# Patient Record
Sex: FEMALE | Race: WHITE | NOT HISPANIC OR LATINO | Employment: FULL TIME | URBAN - METROPOLITAN AREA
[De-identification: names, ages, dates, MRNs, and addresses within clinical notes are randomized per-mention and may not be internally consistent; named-entity substitution may affect disease eponyms.]

---

## 2017-07-24 ENCOUNTER — TELEPHONE (OUTPATIENT)
Dept: INFECTIOUS DISEASES | Facility: CLINIC | Age: 32
End: 2017-07-24

## 2017-07-24 ENCOUNTER — HOSPITAL ENCOUNTER (OUTPATIENT)
Dept: RADIOLOGY | Facility: HOSPITAL | Age: 32
Discharge: HOME | End: 2017-07-24
Attending: INTERNAL MEDICINE
Payer: COMMERCIAL

## 2017-07-24 DIAGNOSIS — R76.12 POSITIVE QUANTIFERON-TB GOLD TEST: Primary | ICD-10-CM

## 2017-07-24 DIAGNOSIS — R76.12 POSITIVE QUANTIFERON-TB GOLD TEST: ICD-10-CM

## 2017-07-24 PROCEDURE — 71020 XR CHEST PA AND LATERAL: CPT | Mod: 26,,, | Performed by: RADIOLOGY

## 2017-07-24 PROCEDURE — 71020 XR CHEST PA AND LATERAL: CPT | Mod: TC

## 2017-08-21 ENCOUNTER — OFFICE VISIT (OUTPATIENT)
Dept: INFECTIOUS DISEASES | Facility: CLINIC | Age: 32
End: 2017-08-21

## 2017-08-21 VITALS
HEART RATE: 64 BPM | SYSTOLIC BLOOD PRESSURE: 125 MMHG | WEIGHT: 223.31 LBS | HEIGHT: 65 IN | TEMPERATURE: 98 F | BODY MASS INDEX: 37.2 KG/M2 | DIASTOLIC BLOOD PRESSURE: 83 MMHG

## 2017-08-21 DIAGNOSIS — Z22.7 LATENT TUBERCULOSIS BY BLOOD TEST: Primary | ICD-10-CM

## 2017-08-21 PROCEDURE — 99204 OFFICE O/P NEW MOD 45 MIN: CPT | Mod: S$PBB,,, | Performed by: INTERNAL MEDICINE

## 2017-08-21 PROCEDURE — 99999 PR PBB SHADOW E&M-EST. PATIENT-LVL III: CPT | Mod: PBBFAC,,, | Performed by: INTERNAL MEDICINE

## 2017-08-21 PROCEDURE — 99213 OFFICE O/P EST LOW 20 MIN: CPT | Mod: PBBFAC | Performed by: INTERNAL MEDICINE

## 2017-08-21 NOTE — PROGRESS NOTES
Subjective:      Patient ID: Katarina Munguia is a 32 y.o. female.    Chief Complaint:Positive PPD      History of Present Illness    Positive PPD, Initial Visit  Patient is here for evaluation of positive quantiferon. PPD was not done in the past. Quantiferon was positive on 7/12/17 .  There is no history of BCG. There is no history of exposure to TB. She is working in [employer]-no patient contact. Current symptoms: none. Patient denies cough, diarrhea, fatigue, fever, night sweats and weight loss. Chest x-ray was done 7/24/17 and result was negative for active TB.     Review of Systems   Constitution: Negative for chills, decreased appetite, fever, weakness, malaise/fatigue, night sweats, weight gain and weight loss.   HENT: Negative for congestion, ear pain, headaches, hearing loss, hoarse voice, sore throat and tinnitus.    Eyes: Negative for blurred vision, redness and visual disturbance.   Cardiovascular: Negative for chest pain, leg swelling and palpitations.   Respiratory: Negative for cough, hemoptysis, shortness of breath and sputum production.    Hematologic/Lymphatic: Negative for adenopathy. Does not bruise/bleed easily.   Skin: Negative for dry skin, itching, rash and suspicious lesions.   Musculoskeletal: Negative for back pain, joint pain, myalgias and neck pain.   Gastrointestinal: Negative for abdominal pain, constipation, diarrhea, heartburn, nausea and vomiting.   Genitourinary: Negative for dysuria, flank pain, frequency, hematuria, hesitancy and urgency.   Neurological: Negative for dizziness, numbness and paresthesias.   Psychiatric/Behavioral: Negative for depression and memory loss. The patient does not have insomnia and is not nervous/anxious.      Objective:   Physical Exam   Constitutional: She is oriented to person, place, and time. She appears well-developed and well-nourished. She is cooperative.  Non-toxic appearance. No distress.   HENT:   Head: Normocephalic and atraumatic.   Right Ear:  Hearing and external ear normal.   Left Ear: Hearing and external ear normal.   Nose: Nose normal.   Mouth/Throat: Oropharynx is clear and moist.   Eyes: Conjunctivae, EOM and lids are normal. Pupils are equal, round, and reactive to light. Right eye exhibits no discharge. Left eye exhibits no discharge. Right conjunctiva is not injected. Left conjunctiva is not injected. No scleral icterus.   Neck: Normal range of motion. No tracheal deviation present.   Cardiovascular: Normal rate.    Pulmonary/Chest: Effort normal. No accessory muscle usage or stridor. No respiratory distress. She has no wheezes.   Abdominal: Normal appearance. She exhibits no distension.   Musculoskeletal: Normal range of motion. She exhibits no edema.   Neurological: She is alert and oriented to person, place, and time. Coordination normal.   Skin: Skin is warm and dry. No rash noted. She is not diaphoretic. No erythema.   Psychiatric: She has a normal mood and affect. Her speech is normal and behavior is normal. Judgment and thought content normal. Cognition and memory are normal.   Nursing note and vitals reviewed.    Assessment:       1. Latent tuberculosis by blood test          Plan:       Katarina was seen today for positive ppd.    Diagnoses and all orders for this visit:    Latent tuberculosis by blood test       The patient has had negative PPDs in the past and no risk factors for TB. Will repeat the quantiferon.   If it is negative, plan to do another quantiferon in 1 year.  If it is positive then will offer isoniazid.  The patient has been counseled regarding latent TB.

## 2018-05-24 ENCOUNTER — OFFICE VISIT (OUTPATIENT)
Dept: ALLERGY | Facility: CLINIC | Age: 33
End: 2018-05-24
Payer: COMMERCIAL

## 2018-05-24 VITALS
BODY MASS INDEX: 35.63 KG/M2 | OXYGEN SATURATION: 99 % | SYSTOLIC BLOOD PRESSURE: 120 MMHG | HEART RATE: 67 BPM | DIASTOLIC BLOOD PRESSURE: 80 MMHG | HEIGHT: 65 IN | WEIGHT: 213.88 LBS

## 2018-05-24 DIAGNOSIS — R05.9 COUGH: ICD-10-CM

## 2018-05-24 DIAGNOSIS — J33.9 NASAL POLYP: Primary | ICD-10-CM

## 2018-05-24 DIAGNOSIS — J31.0 RHINITIS, UNSPECIFIED TYPE: ICD-10-CM

## 2018-05-24 PROCEDURE — 99999 PR PBB SHADOW E&M-EST. PATIENT-LVL III: CPT | Mod: PBBFAC,,, | Performed by: STUDENT IN AN ORGANIZED HEALTH CARE EDUCATION/TRAINING PROGRAM

## 2018-05-24 PROCEDURE — 99203 OFFICE O/P NEW LOW 30 MIN: CPT | Mod: S$GLB,,, | Performed by: STUDENT IN AN ORGANIZED HEALTH CARE EDUCATION/TRAINING PROGRAM

## 2018-05-24 PROCEDURE — 3008F BODY MASS INDEX DOCD: CPT | Mod: CPTII,S$GLB,, | Performed by: STUDENT IN AN ORGANIZED HEALTH CARE EDUCATION/TRAINING PROGRAM

## 2018-05-24 RX ORDER — NORETHINDRONE ACETATE AND ETHINYL ESTRADIOL AND FERROUS FUMARATE 1.5-30(21)
1 KIT ORAL DAILY
COMMUNITY
Start: 2018-05-02 | End: 2021-06-21

## 2018-05-24 RX ORDER — PREDNISONE 20 MG/1
40 TABLET ORAL DAILY
Qty: 28 TABLET | Refills: 0 | Status: SHIPPED | OUTPATIENT
Start: 2018-05-24 | End: 2018-06-07

## 2018-05-24 RX ORDER — FLUTICASONE PROPIONATE 50 MCG
1 SPRAY, SUSPENSION (ML) NASAL DAILY
COMMUNITY
End: 2018-06-01

## 2018-05-24 NOTE — PROGRESS NOTES
Allergy Clinic Note  [facility]    Subjective:      Patient ID: Katarina Munguia is a 32 y.o. female.    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    She reports a several year history of nasal congestion on a perennial basis, both day and night, with no symptom-free days.  She also complains of anterior rhinitis with drainage that is clear and thin.  Associated symptoms include sneezing attacks, mouth breathing and headache pressure below her eyes or across her forehead.  There are no clear precipitants except that the sneezing is worse in her work environment.  She has some minimal relief from Flonase 1 squirt each nostril.  She has not had allergy testing in the past.    Patient also complains of a cough that she localizes to her chest.  She denies chest pressure, heartburn, shortness of breath, or wheezing.  She does admit to dyspnea with 1 flight of stairs that she attributes to deconditioning.  She says she may have had an inhaler as a child but does not recall any details.  No bronchospasm, asthma or chest symptoms as an adult.    She denies significant infection history.  Client admits to occasional green nasal discharge without fever or other constitutional symptoms.  She said it is it usually is able to run its course.      Additional History:   Past medical history is otherwise unremarkable.   Family history is significant for a sister with childhood asthma and 2 sisters with rhinitis. Client   reports that she has never smoked. She does not have any smokeless tobacco history on file.  Exposures are notable for a dog in the bedroom.  No exposure to smoke, mold, or unusual substances.  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    No current outpatient prescriptions on file prior to visit.     No current facility-administered medications on file prior to visit.          Review of Systems   Constitutional: Negative for chills, fever and malaise/fatigue.   HENT: Positive for congestion. Negative for ear discharge.    Eyes: Negative for pain and discharge.   Respiratory: Negative for hemoptysis, shortness of breath, wheezing and stridor.    Cardiovascular: Negative for chest pain and palpitations.   Gastrointestinal: Negative for abdominal pain, blood in stool, nausea and vomiting.   Genitourinary: Negative for dysuria, flank pain and hematuria.   Skin: Negative for itching and rash.   Neurological: Positive for headaches. Negative for seizures and loss of consciousness.       Objective:   /80 (BP Location: Left arm, Patient Position: Sitting)   Pulse 67   Ht 5' 5" (1.651 m)   Wt 97 kg (213 lb 13.5 oz)   SpO2 99%   BMI 35.59 kg/m²       Physical Exam   Constitutional: She is oriented to person, place, and time and well-developed, well-nourished, and in no distress.   HENT:   Head: Normocephalic and atraumatic.   Nose: Nose normal.   Right TM clear.  Left TM opaque area in Center otherwise clear.  Right nares pale with moderate turbinates swelling.  Left nares with irregularly shaped tissue, pink and slightly cool listening seen distally.  It does not clear with nose blowing.   Eyes: Conjunctivae are normal. No scleral icterus.   Neck: Neck supple.   Cardiovascular: Normal rate, regular rhythm, normal heart sounds and intact distal pulses.    Pulmonary/Chest: Effort normal and breath sounds normal. No stridor. No respiratory distress. She has no wheezes.   Abdominal: She exhibits no distension and no mass. There is tenderness (Minimal right upper quadrant tenderness without guarding). There is no guarding.   Musculoskeletal: She exhibits no edema or deformity.   Lymphadenopathy:     She has no cervical adenopathy. "   Neurological: She is alert and oriented to person, place, and time.   Skin: No rash noted. No erythema.   Psychiatric: Affect and judgment normal.       Data: none      Assessment:     1. Nasal polyp    2. Rhinitis, unspecified type    3. Cough        Plan:     Medical decision making:  Ms. Munguia is presenting with chronic nasal congestion and a suspected polyp in her left naris.  We discussed treatment options for nasal polyps including high-dose nasal steroids versus low dose oral steroids versus surgery.  She opted for oral steroids.  We discussed short-term side effects, including hunger and mood swings.  She understands and accepts.  We also discussed methods for allergy testing, including pros and cons of skin testing versus Immunocap blood testing.  She options for skin testing which we will schedule for a future visit.   Her cough is of undetermined etiology at this point.  Despite the fact that she localizes it to her chest, I do not believe she has any bronchospasm.  She may have GERD.  She may have postnasal drip.  Plan to control the rhinitis 1st and then reassess.      Katarina was seen today for allergies and nasal congestion.    Diagnoses and all orders for this visit:    Nasal polyp - new Dx  -     predniSONE (DELTASONE) 20 MG tablet; Take 2 tablets (40 mg total) by mouth once daily. To shrink nasal polyp.   - Hold Flonase    Rhinitis, unspecified type - stable       - skin twsting next visit    Cough - stable  Reassess after upper airway sx controlled.        Patient Instructions   Testing  Skin testing at next visit.  (No antihistamines (e.g. Benadryl, Zyrtec) for 7 days prior.      Treatment  Stop Flonase for now    prednisone = pill steroid:  2 tablets a day for 14 days           Follow-up in about 2 weeks (around 6/7/2018) for skin testing (60 min).    Glenys Guerrero MD

## 2018-05-24 NOTE — PATIENT INSTRUCTIONS
Testing  Skin testing at next visit.  (No antihistamines (e.g. Benadryl, Zyrtec) for 7 days prior.      Treatment  Stop Flonase for now    prednisone = pill steroid:  2 tablets a day for 14 days

## 2018-05-28 ENCOUNTER — PATIENT MESSAGE (OUTPATIENT)
Dept: ALLERGY | Facility: CLINIC | Age: 33
End: 2018-05-28

## 2018-06-01 ENCOUNTER — OFFICE VISIT (OUTPATIENT)
Dept: INTERNAL MEDICINE | Facility: CLINIC | Age: 33
End: 2018-06-01
Payer: COMMERCIAL

## 2018-06-01 DIAGNOSIS — J33.9 NASAL POLYP: ICD-10-CM

## 2018-06-01 DIAGNOSIS — Z00.00 ANNUAL PHYSICAL EXAM: Primary | ICD-10-CM

## 2018-06-01 DIAGNOSIS — E78.5 HYPERLIPIDEMIA, UNSPECIFIED HYPERLIPIDEMIA TYPE: ICD-10-CM

## 2018-06-01 DIAGNOSIS — R03.0 ELEVATED BP WITHOUT DIAGNOSIS OF HYPERTENSION: ICD-10-CM

## 2018-06-01 DIAGNOSIS — Z85.820 HISTORY OF MELANOMA: ICD-10-CM

## 2018-06-01 PROCEDURE — 99385 PREV VISIT NEW AGE 18-39: CPT | Mod: S$GLB,,, | Performed by: INTERNAL MEDICINE

## 2018-06-01 PROCEDURE — 99999 PR PBB SHADOW E&M-EST. PATIENT-LVL III: CPT | Mod: PBBFAC,,, | Performed by: INTERNAL MEDICINE

## 2018-06-01 NOTE — PROGRESS NOTES
Internal Medicine    Subjective:      Patient ID: Katarina Munguia is a 33 y.o. female.    Chief Complaint: Establish Care    HPI:  Patient presents to establish care.     Elevated BP reading at work health screen, chiropractor, and home.  HLD on work screen.      BMI 35:  Since work screen has been following [employer] and working on exercise.    Saw Allergy, Dr. Guerrero, on 5/24/18.  History of nasal congestion and nasal polyp.  Prescribed prednisone (has not started yet).  Planning on skin testing.  Previously used Flonase and it was not helpful.      H/o melanoma:  Followed by Dermatology, Dr. Howard - gets annual screens.    ObGyn:  Dr. Mas at [facility].        Review of Systems   Constitutional: Negative for appetite change, chills, fatigue, fever and unexpected weight change.   HENT: Positive for congestion and sneezing. Negative for sinus pain, sinus pressure, sore throat and trouble swallowing.    Eyes: Negative for visual disturbance.   Respiratory: Negative for cough, chest tightness, shortness of breath and wheezing.    Cardiovascular: Negative for chest pain, palpitations and leg swelling.   Gastrointestinal: Negative for abdominal pain, blood in stool, constipation, diarrhea, nausea and vomiting.   Genitourinary: Negative for difficulty urinating.   Musculoskeletal: Negative for arthralgias and myalgias.   Skin: Negative for rash and wound.   Neurological: Negative for dizziness, weakness, numbness and headaches.   Psychiatric/Behavioral: Negative for behavioral problems and confusion.       Past Medical History:   Diagnosis Date    Allergy     Melanoma      Past Surgical History:   Procedure Laterality Date    FOOT SURGERY      TONSILLECTOMY       Family History   Problem Relation Age of Onset    Hypertension Mother     Hyperlipidemia Mother     Transient ischemic attack Mother     Cancer Father     Lung cancer Father     Heart disease Maternal Grandfather     Heart disease Sister   "    Social History   Substance Use Topics    Smoking status: Never Smoker    Smokeless tobacco: Never Used    Alcohol use 0.6 oz/week     1 Cans of beer per week      Comment: occasional       Medications and allergies reviewed.     Objective:     Vitals:    06/01/18 0917 06/03/18 1129   BP: 110/72 (!) 125/90   Weight: 95.4 kg (210 lb 5.1 oz)    Height: 5' 5" (1.651 m)      Physical Exam   Constitutional: She is oriented to person, place, and time. She appears well-developed and well-nourished. No distress.   HENT:   Head: Normocephalic and atraumatic.   Eyes: Conjunctivae and EOM are normal. No scleral icterus.   Neck: No thyromegaly present.   Cardiovascular: Normal rate and regular rhythm.  Exam reveals no gallop and no friction rub.    No murmur heard.  Pulmonary/Chest: Effort normal and breath sounds normal. No respiratory distress. She has no wheezes. She has no rales.   Abdominal: Soft. Bowel sounds are normal. She exhibits no distension and no mass. There is no tenderness. There is no rebound and no guarding.   Musculoskeletal: She exhibits no edema or tenderness.   Lymphadenopathy:     She has no cervical adenopathy.   Neurological: She is alert and oriented to person, place, and time.   Skin: No rash noted. No erythema.   Psychiatric: She has a normal mood and affect. Her behavior is normal.       Assessment:     1. Annual physical exam    2. Elevated BP without diagnosis of hypertension    3. Hyperlipidemia, unspecified hyperlipidemia type    4. Nasal polyp    5. History of melanoma    6. BMI 35.0-35.9,adult        Plan:     *Continue medication/plan as discussed in HPI except for changes discussed below.    Katarina was seen today for establish care.    Diagnoses and all orders for this visit:    Annual physical exam  -     CBC auto differential; Future; Expected date: 06/01/2018  -     Comprehensive metabolic panel; Future; Expected date: 06/01/2018  -     Hemoglobin A1c; Future; Expected date: " 06/01/2018  -     Lipid panel; Future; Expected date: 06/01/2018  -     TSH; Future; Expected date: 06/01/2018    Elevated BP without diagnosis of hypertension  -     Comprehensive metabolic panel; Future; Expected date: 06/01/2018  -     TSH; Future; Expected date: 06/01/2018    Hyperlipidemia, unspecified hyperlipidemia type  -     Lipid panel; Future; Expected date: 06/01/2018    BMI 35    Nasal polyp    History of melanoma    Health maintenance reviewed with patient.     Follow-up in about 4 weeks (around 6/29/2018).    Shilpa Sarabia MD  Internal Medicine  [facility]

## 2018-06-01 NOTE — PATIENT INSTRUCTIONS
Please check your blood pressure daily and bring blood pressure log and blood pressure cuff to next appointment.

## 2018-06-01 NOTE — PROGRESS NOTES
Allergy Clinic Note  [facility]    Subjective:          Chief Complaint: Allergy Testing      Allergy problem list  Rhinitis  Cough  Suspected nasal polyp    History of Present Illness: Katarina Munguia is a 32 y.o. female [employer] employee with rhinitis, cough, and suspected nasal polyp who returns at my request to follow up symptoms and assess triggers.     At last visit, patient noted to have a blocked left naris with glistening tissue.  She was prescribed a 2 week course of prednisone for suspected nasal polyp.  She opted for skin testing rather than serum specific IgE levels because she is considering allergy vaccines.     Related medications  Prednisone 40 mg qd F14 -- started 3 days ago    She reports blood with blowing her nose this morning.  Patient reports burning Q-tip in both nostrils.   She still complains of nasal congestion greater on the left.     No new problems or complaints.     Additional History:   Interval Hx is unremarkable.  Client is a lifetime nonsmoker. Past medical, family, and social histories are unchanged.  Exposures are notable for dog in the bedroom                   Patient Active Problem List   Diagnosis    Elevated BP without diagnosis of hypertension    Hyperlipidemia    Nasal polyp    History of melanoma    BMI 35.0-35.9,adult     Current Outpatient Prescriptions on File Prior to Visit   Medication Sig Dispense Refill    MICROGESTIN FE 1.5/30, 28, 1.5 mg-30 mcg (21)/75 mg (7) tablet       multivitamin capsule Take 1 capsule by mouth once daily.      predniSONE (DELTASONE) 20 MG tablet Take 2 tablets (40 mg total) by mouth once daily. To shrink nasal polyp. 28 tablet 0     No current facility-administered medications on file prior to visit.          Review of Systems   Constitutional: Negative for chills and fever.   HENT: Negative for ear discharge and nosebleeds.    Eyes: Negative for discharge and redness.   Respiratory: Negative for hemoptysis, sputum  "production and stridor.    Gastrointestinal: Negative for blood in stool, melena and vomiting.   Genitourinary: Negative for hematuria.   Skin: Negative for itching and rash.   Neurological: Negative for seizures and loss of consciousness.       Objective:   BP (!) 160/110 (BP Location: Left arm, Patient Position: Sitting)   Pulse 102   Ht 5' 5" (1.651 m)   Wt 97.5 kg (214 lb 15.2 oz)   LMP 06/01/2018   SpO2 98%   BMI 35.77 kg/m²       Physical Exam   Constitutional: She is oriented to person, place, and time and well-developed, well-nourished, and in no distress.   HENT:   Head: Normocephalic and atraumatic.   Nose: Nose normal.   Right nares clear.  Left nares copious dark heme obscuring nasal passage.  Source not identified   Eyes: Conjunctivae are normal. No scleral icterus.   Neck: Neck supple.   Cardiovascular: Normal rate, regular rhythm, normal heart sounds and intact distal pulses.    Pulmonary/Chest: Effort normal and breath sounds normal. No stridor. No respiratory distress. She has no wheezes.   Abdominal: She exhibits no distension.   Musculoskeletal: She exhibits no edema or deformity.   Neurological: She is alert and oriented to person, place, and time.   Skin: No rash noted. No erythema.   Psychiatric: Affect and judgment normal.       Data:   aeroallergen testing by the skin prick method with completely negative with appropriate positive and negative controls.      Assessment:     1. Nonallergic rhinitis    2. Nasal polyp    3. Nasal bleeding        Plan:     Medical Decision Making:  Client is presenting with nonallergic rhinitis and suspected nasal polyp.  Her nasal exam today is impeded by her recent nose bleed, which was probably traumatic.  Patient instructed not to put anything in her nose until next visit, near completion of her prednisone course.    Katarina was seen today for allergy testing.    Diagnoses and all orders for this visit:    Nonallergic rhinitis  Copy skin tests " given  Discussed pg vasomotor rhinitis and HO given    Suspected Nasal polyp  Prednisone 40 mg qd x 14 days total  RTC near end of course    Nasal bleeding  probably traumatic  Don't put anything in your nose until next visit        Patient Instructions       Continue prednisone 2 tablets daily                Return 7-10 days      Follow-up in about 10 days (around 6/14/2018).    Glenys Guerrero MD

## 2018-06-03 VITALS
BODY MASS INDEX: 35.04 KG/M2 | SYSTOLIC BLOOD PRESSURE: 125 MMHG | DIASTOLIC BLOOD PRESSURE: 90 MMHG | HEIGHT: 65 IN | WEIGHT: 210.31 LBS

## 2018-06-03 PROBLEM — E78.5 HYPERLIPIDEMIA: Status: ACTIVE | Noted: 2018-06-03

## 2018-06-03 PROBLEM — R03.0 ELEVATED BP WITHOUT DIAGNOSIS OF HYPERTENSION: Status: ACTIVE | Noted: 2018-06-03

## 2018-06-03 PROBLEM — J33.9 NASAL POLYP: Status: ACTIVE | Noted: 2018-06-03

## 2018-06-03 PROBLEM — Z85.820 HISTORY OF MELANOMA: Status: ACTIVE | Noted: 2018-06-03

## 2018-06-04 ENCOUNTER — OFFICE VISIT (OUTPATIENT)
Dept: ALLERGY | Facility: CLINIC | Age: 33
End: 2018-06-04
Payer: COMMERCIAL

## 2018-06-04 VITALS
HEART RATE: 102 BPM | BODY MASS INDEX: 35.81 KG/M2 | SYSTOLIC BLOOD PRESSURE: 160 MMHG | HEIGHT: 65 IN | OXYGEN SATURATION: 98 % | DIASTOLIC BLOOD PRESSURE: 110 MMHG | WEIGHT: 214.94 LBS

## 2018-06-04 DIAGNOSIS — J33.9 NASAL POLYP: ICD-10-CM

## 2018-06-04 DIAGNOSIS — J31.0 NONALLERGIC RHINITIS: Primary | ICD-10-CM

## 2018-06-04 DIAGNOSIS — R04.0 NASAL BLEEDING: ICD-10-CM

## 2018-06-04 PROCEDURE — 3008F BODY MASS INDEX DOCD: CPT | Mod: CPTII,S$GLB,, | Performed by: STUDENT IN AN ORGANIZED HEALTH CARE EDUCATION/TRAINING PROGRAM

## 2018-06-04 PROCEDURE — 99213 OFFICE O/P EST LOW 20 MIN: CPT | Mod: 25,S$GLB,, | Performed by: STUDENT IN AN ORGANIZED HEALTH CARE EDUCATION/TRAINING PROGRAM

## 2018-06-04 PROCEDURE — 99999 PR PBB SHADOW E&M-EST. PATIENT-LVL III: CPT | Mod: PBBFAC,,, | Performed by: STUDENT IN AN ORGANIZED HEALTH CARE EDUCATION/TRAINING PROGRAM

## 2018-06-04 PROCEDURE — 95004 PERQ TESTS W/ALRGNC XTRCS: CPT | Mod: S$GLB,,, | Performed by: STUDENT IN AN ORGANIZED HEALTH CARE EDUCATION/TRAINING PROGRAM

## 2018-06-04 NOTE — PATIENT INSTRUCTIONS
Continue prednisone 2 tablets daily      Do not put anything in your nose until next visit.          Return 7-10 days

## 2018-06-11 ENCOUNTER — OFFICE VISIT (OUTPATIENT)
Dept: ALLERGY | Facility: CLINIC | Age: 33
End: 2018-06-11
Payer: COMMERCIAL

## 2018-06-11 VITALS
WEIGHT: 216.25 LBS | HEIGHT: 65 IN | HEART RATE: 84 BPM | DIASTOLIC BLOOD PRESSURE: 88 MMHG | OXYGEN SATURATION: 98 % | SYSTOLIC BLOOD PRESSURE: 124 MMHG | BODY MASS INDEX: 36.03 KG/M2

## 2018-06-11 DIAGNOSIS — R05.9 COUGH: ICD-10-CM

## 2018-06-11 DIAGNOSIS — R04.0 NASAL BLEEDING: Primary | ICD-10-CM

## 2018-06-11 DIAGNOSIS — J31.0 NONALLERGIC RHINITIS: ICD-10-CM

## 2018-06-11 PROCEDURE — 3008F BODY MASS INDEX DOCD: CPT | Mod: CPTII,S$GLB,, | Performed by: STUDENT IN AN ORGANIZED HEALTH CARE EDUCATION/TRAINING PROGRAM

## 2018-06-11 PROCEDURE — 99213 OFFICE O/P EST LOW 20 MIN: CPT | Mod: S$GLB,,, | Performed by: STUDENT IN AN ORGANIZED HEALTH CARE EDUCATION/TRAINING PROGRAM

## 2018-06-11 PROCEDURE — 99999 PR PBB SHADOW E&M-EST. PATIENT-LVL III: CPT | Mod: PBBFAC,,, | Performed by: STUDENT IN AN ORGANIZED HEALTH CARE EDUCATION/TRAINING PROGRAM

## 2018-06-11 NOTE — PROGRESS NOTES
Allergy Clinic Note  [facility]    Subjective:          Chief Complaint: Follow-up      Allergy problem list  Nasal septal erosion  Hx nasal polyps  LUISA    History of Present Illness: Katarina Munguia is a 33 y.o. female with significant left septal erosion and possible nasal polyps who returns at my request to follow up nasal Px following 2 week course of prednisone.    At last visit, skin tests were negative but on exam L naris had copious heme and mucosa was not visible.    She reports no significant difference in nasal congestion.  No side effects or other problems.    Related medications  Prednisone 40 qd f14 -- on about day 12  Flonase - held    She denies recent cough.    No new problems or complaints.    Additional History:   Interval Hx is unremarkable.  Client is a lifetime nonsmoker.Past medical, family, and social histories are unchanged.       Patient Active Problem List   Diagnosis    Elevated BP without diagnosis of hypertension    Hyperlipidemia    Nasal polyp    History of melanoma    BMI 35.0-35.9,adult     Current Outpatient Prescriptions on File Prior to Visit   Medication Sig Dispense Refill    MICROGESTIN FE 1.5/30, 28, 1.5 mg-30 mcg (21)/75 mg (7) tablet       multivitamin capsule Take 1 capsule by mouth once daily.       No current facility-administered medications on file prior to visit.          Review of Systems   Constitutional: Negative for chills and fever.   HENT: Negative for ear discharge and nosebleeds.    Eyes: Negative for discharge and redness.   Respiratory: Negative for hemoptysis, sputum production and stridor.    Cardiovascular: Negative for chest pain and palpitations.   Gastrointestinal: Negative for blood in stool, melena and vomiting.   Genitourinary: Negative for dysuria and hematuria.   Skin: Negative for itching and rash.   Neurological: Negative for seizures and loss of consciousness.       Objective:   /88 (BP Location: Right arm, Patient  "Position: Sitting)   Pulse 84   Ht 5' 5" (1.651 m)   Wt 98.1 kg (216 lb 4.3 oz)   LMP 06/01/2018   SpO2 98%   BMI 35.99 kg/m²       Physical Exam   Constitutional: She is oriented to person, place, and time and well-developed, well-nourished, and in no distress.   HENT:   Head: Normocephalic and atraumatic.   Nose: Nose normal.   TMs clear bilaterally.  Right naris pink without significant swelling.  Left nares enlarged tissue along the lateral wall without heme or other abnormality.    Eyes: Conjunctivae are normal. No scleral icterus.   Neck: Neck supple.   Cardiovascular: Normal rate, regular rhythm and intact distal pulses.    Pulmonary/Chest: Effort normal. No stridor. No respiratory distress.   Abdominal: She exhibits no distension.   Musculoskeletal: She exhibits no edema or deformity.   Neurological: She is alert and oriented to person, place, and time.   Skin: No rash noted. No erythema.   Psychiatric: Affect and judgment normal.       Data:   Aeroallergen testing by the prick method was negative last      Assessment:     1. Nasal bleeding    2. Cough    3. Nonallergic rhinitis        Plan:     Medical Decision Making:  Patient is nasal exam is significantly improved.  No polyp is appreciated.  Patient continues to complain of congestion but would prefer not to continue Flonase.  As right now her treatment is mainly symptomatic, I geovaniLucie Bray was seen today for follow-up.    Diagnoses and all orders for this visit:    Nasal bleeding - resolved  Return if recurs    Cough - quiescent with no Tx  Return if recurs    Nonallergic rhinitis - stable  Patient prefers not to restart Flonase  Return p.r.n.        Patient Instructions   Sore in Left nostril is significantly improved.    Further Flonase is optional.    Return as needed.      Follow-up if symptoms worsen or fail to improve.    Glenys Guerrero MD    "

## 2018-06-11 NOTE — PATIENT INSTRUCTIONS
Sore in Left nostril is significantly improved.    Further Flonase is optional.    Return as needed.

## 2018-08-03 ENCOUNTER — PATIENT MESSAGE (OUTPATIENT)
Dept: ADMINISTRATIVE | Facility: OTHER | Age: 33
End: 2018-08-03

## 2018-08-03 ENCOUNTER — OFFICE VISIT (OUTPATIENT)
Dept: INTERNAL MEDICINE | Facility: CLINIC | Age: 33
End: 2018-08-03
Payer: COMMERCIAL

## 2018-08-03 VITALS
BODY MASS INDEX: 35.74 KG/M2 | WEIGHT: 214.5 LBS | SYSTOLIC BLOOD PRESSURE: 136 MMHG | HEART RATE: 84 BPM | DIASTOLIC BLOOD PRESSURE: 90 MMHG | HEIGHT: 65 IN

## 2018-08-03 DIAGNOSIS — I10 ESSENTIAL HYPERTENSION: Primary | ICD-10-CM

## 2018-08-03 DIAGNOSIS — E78.5 HYPERLIPIDEMIA, UNSPECIFIED HYPERLIPIDEMIA TYPE: ICD-10-CM

## 2018-08-03 PROCEDURE — 3008F BODY MASS INDEX DOCD: CPT | Mod: CPTII,S$GLB,, | Performed by: INTERNAL MEDICINE

## 2018-08-03 PROCEDURE — 3080F DIAST BP >= 90 MM HG: CPT | Mod: CPTII,S$GLB,, | Performed by: INTERNAL MEDICINE

## 2018-08-03 PROCEDURE — 3075F SYST BP GE 130 - 139MM HG: CPT | Mod: CPTII,S$GLB,, | Performed by: INTERNAL MEDICINE

## 2018-08-03 PROCEDURE — 99999 PR PBB SHADOW E&M-EST. PATIENT-LVL III: CPT | Mod: PBBFAC,,, | Performed by: INTERNAL MEDICINE

## 2018-08-03 PROCEDURE — 99214 OFFICE O/P EST MOD 30 MIN: CPT | Mod: S$GLB,,, | Performed by: INTERNAL MEDICINE

## 2018-08-03 NOTE — PROGRESS NOTES
"Internal Medicine    Subjective:      Patient ID: Katarina Munguia is a 33 y.o. female.    Chief Complaint: Follow-up (essential hypertension)    HPI:  Patient presents for follow up appointment.  The patient's last visit with me was on 6/1/2018.    Elevated BP reading at work health screen, chiropractor, and home.  Was normal at last appointment.  Has been monitoring BP at home and it has been running 130-150's/80-90's.  /110 with her cuff and 136/90 with manual.  Patient feels her birth control is contributing.       HLD:  Currently not on medication.  Total cholesterol 271 and .  Working on weight loss.      BMI 35:  Working on diet and exercise.       H/o nasal congestion:  Saw Allergy, Dr. Guerrero, on 6/1/18.  Treated for nasal polyp.  No allergies per testing.  Symptoms improved.    H/o melanoma:  Followed by Dermatology, Dr. Howard - gets annual screens.     ObGyn:  Dr. Mas at [facility].        Review of Systems   Constitutional: Negative for chills and fever.   HENT: Negative for congestion.    Eyes: Negative for visual disturbance.   Respiratory: Negative for chest tightness and shortness of breath.    Cardiovascular: Negative for chest pain and palpitations.   Gastrointestinal: Negative for abdominal pain.   Genitourinary: Negative for difficulty urinating.   Neurological: Negative for dizziness and headaches.   Psychiatric/Behavioral: Negative for confusion.       Past medical history, surgical history, and family medical history reviewed and updated as appropriate.    Medications and allergies reviewed.     Objective:     Vitals:    08/03/18 1339   BP: (!) 136/90   Pulse: 84   Weight: 97.3 kg (214 lb 8.1 oz)   Height: 5' 5" (1.651 m)     Physical Exam   Constitutional: She is oriented to person, place, and time. She appears well-developed and well-nourished. No distress.   HENT:   Head: Normocephalic and atraumatic.   Eyes: Conjunctivae and EOM are normal. No scleral icterus. "   Cardiovascular: Normal rate and regular rhythm.  Exam reveals no gallop and no friction rub.    No murmur heard.  Pulmonary/Chest: Effort normal and breath sounds normal. No respiratory distress. She has no wheezes. She has no rales.   Abdominal: Soft. She exhibits no distension.   Musculoskeletal: She exhibits no edema or tenderness.   Neurological: She is alert and oriented to person, place, and time.   Skin: No rash noted. No erythema.   Psychiatric: She has a normal mood and affect. Her behavior is normal.       RESULTS:   Lab Results   Component Value Date    WBC 8.31 06/01/2018    HGB 13.8 06/01/2018    HCT 40.6 06/01/2018    MCV 87 06/01/2018     06/01/2018     BMP  Lab Results   Component Value Date     06/01/2018    K 4.4 06/01/2018     06/01/2018    CO2 25 06/01/2018    BUN 9 06/01/2018    CREATININE 0.7 06/01/2018    CALCIUM 9.6 06/01/2018    ANIONGAP 9 06/01/2018    ESTGFRAFRICA >60 06/01/2018    EGFRNONAA >60 06/01/2018     Lab Results   Component Value Date    ALT 27 06/01/2018    AST 19 06/01/2018    ALKPHOS 37 (L) 06/01/2018    BILITOT 0.8 06/01/2018     Lab Results   Component Value Date    TSH 0.839 06/01/2018     Lab Results   Component Value Date    HGBA1C 5.1 06/01/2018         Assessment:     1. Essential hypertension    2. Hyperlipidemia, unspecified hyperlipidemia type    3. Body mass index (BMI) 35.0-35.9, adult        Plan:     *Continue medication/plan as discussed in HPI except for changes discussed below.    Katarina was seen today for follow-up.    Diagnoses and all orders for this visit:    Essential hypertension  -     Hypertension Digital Medicine (HDMP) Enrollment Order  -     Hypertension Digital Medicine ([facility]): Assign Onboarding Questionnaires    Hyperlipidemia, unspecified hyperlipidemia type   Patient will work on diet and exercise and plan will be to recheck in 6 months.  If still elevated, will start statin.  -     Lipid panel; Future; Expected date:  08/03/2018    Body mass index (BMI) 35.0-35.9, adult   Discussed plan for weight loss.  She will let me know if she is interested in Health  or Medical Fitness Program.    Health maintenance reviewed with patient.     Follow-up in about 6 months (around 2/3/2019).    Shilpa Sarabia MD  Internal Medicine  [facility]

## 2018-08-09 ENCOUNTER — PATIENT OUTREACH (OUTPATIENT)
Dept: OTHER | Facility: OTHER | Age: 33
End: 2018-08-09

## 2018-08-09 NOTE — LETTER
Jody Del Castillo, PharmD  [address] [place] [address]     Dear Katarina Munguia,    Welcome to the [facility]!           My name is Jody Del Castillo PharmD and I am your dedicated Digital Medicine clinician.  As an expert in medication management, I will help ensure that the medications you are taking continue to provide you with the intended benefits.        I am Rodri Shafer and I will be your health  for the duration of the program.  My  job is to help you identify lifestyle changes to improve your blood pressure control.  We will talk about nutrition, exercise, and other ways that you may be able to adjust your current habits to better your health. Together, we will work to improve your overall health and encourage you to meet your goals for a healthier lifestyle.    What we expect from YOU:    You will need to take blood pressure readings multiple times a week and no less than one reading per week.   It is important that you take your measurements at different times during the day, when possible.     What you should expect from your Digital Medicine Care Team:   We will provide you with education about high blood pressure, including lifestyle changes that could help you to control your blood pressure.   We will review your weekly readings and provide you with monthly blood pressure progress reports after you have been in the program for more than 30 days.   We will send monthly progress reports on your blood pressure control to your physician so they can follow along with your progress as well.    You will be able to reach me by phone at [phone] or through your [facility] account by clicking my name under Care Team on the right side of the home screen.    I look forward to working with you to achieve your blood pressure goals!    Sincerely,  Jody Del Castillo PharmD  Your personal clinician    Please visit  [email or web address] to learn more about high blood pressure and what you can do lower your blood pressure.                                                                                           Katarina Munguia  [address] [place] [id number]

## 2018-08-09 NOTE — PROGRESS NOTES
Last 5 Patient Entered Readings                                      Current 30 Day Average: 135/96     Recent Readings 8/3/2018 8/3/2018    SBP (mmHg) 135 135    DBP (mmHg) 96 96    Pulse 75 75        Digital Medicine: Health  Introduction Call     8/9: Could not leave , mailbox was full.  Will send [employer] message.

## 2018-08-13 ENCOUNTER — OFFICE VISIT (OUTPATIENT)
Dept: INFECTIOUS DISEASES | Facility: CLINIC | Age: 33
End: 2018-08-13
Payer: COMMERCIAL

## 2018-08-13 VITALS
HEIGHT: 65 IN | SYSTOLIC BLOOD PRESSURE: 138 MMHG | TEMPERATURE: 98 F | HEART RATE: 74 BPM | BODY MASS INDEX: 36.07 KG/M2 | DIASTOLIC BLOOD PRESSURE: 90 MMHG | WEIGHT: 216.5 LBS

## 2018-08-13 DIAGNOSIS — R76.12 POSITIVE QUANTIFERON-TB GOLD TEST: Primary | ICD-10-CM

## 2018-08-13 PROCEDURE — 99215 OFFICE O/P EST HI 40 MIN: CPT | Mod: S$GLB,,, | Performed by: INTERNAL MEDICINE

## 2018-08-13 PROCEDURE — 3008F BODY MASS INDEX DOCD: CPT | Mod: CPTII,S$GLB,, | Performed by: INTERNAL MEDICINE

## 2018-08-13 PROCEDURE — 99999 PR PBB SHADOW E&M-EST. PATIENT-LVL III: CPT | Mod: PBBFAC,,, | Performed by: INTERNAL MEDICINE

## 2018-08-13 RX ORDER — RIFAMPIN 300 MG/1
600 CAPSULE ORAL DAILY
Qty: 180 CAPSULE | Refills: 1 | Status: SHIPPED | OUTPATIENT
Start: 2018-08-13 | End: 2018-12-11

## 2018-08-13 NOTE — PROGRESS NOTES
Last 5 Patient Entered Readings                                      Current 30 Day Average: 138/85     Recent Readings 8/12/2018 8/9/2018 8/3/2018 8/3/2018    SBP (mmHg) 148 131 135 135    DBP (mmHg) 94 64 96 96    Pulse 76 70 75 75        Digital Medicine: Health  Introduction Call     8/13: Could not leave , mailbox was full.  Will send [employer] message.

## 2018-08-13 NOTE — PROGRESS NOTES
Subjective:      Patient ID: Katarina Munguia is a 33 y.o. female.    Chief Complaint:Positive PPD (pos quant gold)      History of Present Illness    Patient here today for f/u of positive quantiferon. She is accompanied by her mother.  Her recently repeated quantiferon is positive.  Denies symptoms.  CXR was negative.   She has been on OCP but for reasons other than birth control. Not currently taking it as prescription  so there is a lapse but does plan to resume.  Does drink alcohol socially.    From my initial note 17:  Positive PPD, Initial Visit  Patient is here for evaluation of positive quantiferon. PPD was not done in the past. Quantiferon was positive on 17 .  There is no history of BCG. There is no history of exposure to TB. She is working in [employer]-no patient contact. Current symptoms: none. Patient denies cough, diarrhea, fatigue, fever, night sweats and weight loss. Chest x-ray was done 17 and result was negative for active TB.     Review of Systems   Constitution: Negative for chills, decreased appetite, fever, weakness, malaise/fatigue, night sweats, weight gain and weight loss.   HENT: Negative for congestion, ear pain, hearing loss, hoarse voice, sore throat and tinnitus.    Eyes: Negative for blurred vision, redness and visual disturbance.   Cardiovascular: Negative for chest pain, leg swelling and palpitations.   Respiratory: Negative for cough, hemoptysis, shortness of breath and sputum production.    Hematologic/Lymphatic: Negative for adenopathy. Does not bruise/bleed easily.   Skin: Negative for dry skin, itching, rash and suspicious lesions.   Musculoskeletal: Negative for back pain, joint pain, myalgias and neck pain.   Gastrointestinal: Negative for abdominal pain, constipation, diarrhea, heartburn, nausea and vomiting.   Genitourinary: Negative for dysuria, flank pain, frequency, hematuria, hesitancy and urgency.   Neurological: Negative for dizziness, headaches,  numbness and paresthesias.   Psychiatric/Behavioral: Negative for depression and memory loss. The patient does not have insomnia and is not nervous/anxious.      Objective:   Physical Exam   Constitutional: She is oriented to person, place, and time. She appears well-developed and well-nourished. She is cooperative.  Non-toxic appearance. No distress.   HENT:   Head: Normocephalic and atraumatic.   Right Ear: Hearing and external ear normal.   Left Ear: Hearing and external ear normal.   Nose: Nose normal.   Mouth/Throat: Oropharynx is clear and moist.   Eyes: Conjunctivae, EOM and lids are normal. Pupils are equal, round, and reactive to light. Right eye exhibits no discharge. Left eye exhibits no discharge. Right conjunctiva is not injected. Left conjunctiva is not injected. No scleral icterus.   Neck: Normal range of motion. No tracheal deviation present.   Cardiovascular: Normal rate.   Pulmonary/Chest: Effort normal. No accessory muscle usage or stridor. No respiratory distress. She has no wheezes.   Abdominal: Normal appearance. She exhibits no distension.   Musculoskeletal: Normal range of motion. She exhibits no edema.   Neurological: She is alert and oriented to person, place, and time. Coordination normal.   Skin: Skin is warm and dry. No rash noted. She is not diaphoretic. No erythema.   Psychiatric: She has a normal mood and affect. Her speech is normal and behavior is normal. Judgment and thought content normal. Cognition and memory are normal.   Nursing note and vitals reviewed.    Assessment:       1. Positive QuantiFERON-TB Gold test          Plan:       Katarina was seen today for positive ppd.    Diagnoses and all orders for this visit:    Positive QuantiFERON-TB Gold test  -     ALT (SGPT); Standing    Other orders  -     rifAMpin (RIFADIN) 300 MG capsule; Take 2 capsules (600 mg total) by mouth once daily.       ASSESSMENT:  1. Latent tuberculosis infection (LTBI).  2. No evidence of active  TB.    RECOMMENDATIONS:  1. The patient has been counseled regarding risks and benefits of prophylactic therapy.   2. The patient would like to take medication for latent TB.  3. A prescription for rifampin 600 mg every day was provided to the patient.  4. The patient was counseled about possible side effects related to the medication including interaction with OCP and need for another form of contraception, and discoloration of tears, urine, contact lenses. Counseled regarding limiting ETOH.  5. The patient has baseline ALT that is ok, will plan f/u labs monthly.  6.  The patient was asked to let me know if there are any problems tolerating the medication.    The majority of this visit was spent reviewing results, discussing the implications, and answering the patients questions regarding latent TB with the patient and her mother.     The patient has been counseled regarding latent TB.

## 2018-08-14 NOTE — PROGRESS NOTES
"Last 5 Patient Entered Readings                                      Current 30 Day Average: 138/85     Recent Readings 8/12/2018 8/9/2018 8/3/2018 8/3/2018    SBP (mmHg) 148 131 135 135    DBP (mmHg) 94 64 96 96    Pulse 76 70 75 75        Digital Medicine: Health  Introduction    Introduced Ms. Katarina Munguia to digital medicine. Discussed health  role and recommended lifestyle modifications.  Reviewed proper technique for taking BP readings. PT acknowledged.  Told PT to take more BP readings. PT acknowledged.      Lifestyle Assessment:     Current Dietary Habits(i.e. low sodium, food labels, dining out):   PT reports doing weight watchers, and "cutting out dairy."   PT admits to not reading sodium levels on packages as well as "not really watching her sodium".  I advised PT to start looking at lables and paying attention to her sodium intake. PT acknowledged.    Exercise:  PT reports exercising x3/wk. She "goes to dance x2/wk and yoga x1/wk."    Alcohol/Tobacco:  PT reports no tobacco use.  PT admits to drinking x1-2/wk.  She states "maybe 2 or 3 drinks each time".    Medication Adherence:   PT reports she is not on medication.  PT reports she is on birth control.    Other goals:  Will discuss next call.    Reviewed AHA/AACE recommendations:  Limit sodium intake to <2000mg/day. PT acknowledged.      Reviewed the importance of self-monitoring, medication adherence, and that the health  can be used as a resource for lifestyle modifications to help reduce or maintain a healthy lifestyle.  Reviewed that the Digital Medicine team is not available for emergencies and instructed the patient to call 911 or [facility] ([phone] or [phone]) if one arises.  "

## 2018-08-22 ENCOUNTER — PATIENT OUTREACH (OUTPATIENT)
Dept: OTHER | Facility: OTHER | Age: 33
End: 2018-08-22

## 2018-08-22 NOTE — PROGRESS NOTES
Last 5 Patient Entered Readings                                      Current 30 Day Average: 136/85     Recent Readings 8/22/2018 8/21/2018 8/21/2018 8/16/2018 8/15/2018    SBP (mmHg) 132 134 134 138 136    DBP (mmHg) 85 87 90 85 85    Pulse 73 68 73 68 72      Patient called but I had to go to a meeting. Patient requested a return call at 4pm on her work number [phone].    Patient's BP average is above goal of <130/80.     Patient denies s/s of hypotension (lightheadedness, dizziness, nausea, fatigue) associated with low readings. Instructed patient to inform me if this occurs, patient confirms understanding.      Patient denies s/s of hypertension (SOB, CP, severe headaches, changes in vision) associated with high readings. Instructed patient to go to the ED if BP > 180/110 and accompanied by hypertensive s/s, patient confirms understanding.    Will continue to monitor regularly. Will follow up in 2-3 weeks, sooner if BP begins to trend upward or downward.    Patient has my contact information and knows to call with any concerns or clinical changes.     Current HTN regimen: Patient is not on medications at this time.    34 yo female with a PMH of HTN, HLD, melanoma. Patient reports stress, diet and weight as the triggers for high blood pressure. She is improving her lifestyle through exercise and dieting with a meal plan. She is doing yoga and an exercise program twice weekly. Patient would like to wait a month before making medication adjustments and will continue to work on lifestyle at this time.

## 2018-09-04 ENCOUNTER — PATIENT OUTREACH (OUTPATIENT)
Dept: OTHER | Facility: OTHER | Age: 33
End: 2018-09-04

## 2018-09-04 NOTE — PROGRESS NOTES
Last 5 Patient Entered Readings                                      Current 30 Day Average: 138/88     Recent Readings 9/1/2018 8/31/2018 8/22/2018 8/21/2018 8/21/2018    SBP (mmHg) 149 133 132 134 134    DBP (mmHg) 98 103 85 87 90    Pulse 73 72 73 68 73            Digital Medicine: Health  Follow Up    9/4: Left voicemail to follow up with Ms. Katarina Munguia.  Current BP average 138/88 mmHg is not at goal, <130/80 mmHg.

## 2018-09-11 NOTE — PROGRESS NOTES
"Last 5 Patient Entered Readings                                      Current 30 Day Average: 138/91     Recent Readings 9/10/2018 9/6/2018 9/1/2018 8/31/2018 8/22/2018    SBP (mmHg) 132 140 149 133 132    DBP (mmHg) 90 91 98 103 85    Pulse 82 81 73 72 73          Digital Medicine: Health  Follow Up  Patient just returned from vacation in [place], she states "her readings looked better after vacation and really enjoyed her trip."    Lifestyle Modifications:    1.Dietary Modifications (Sodium intake <2,000mg/day, food labels, dining out):   Pt reports she is looking at food labels still and did not realize "her 'healthy' frozen meals, were not really healthy at all."  She is now doing a meal prep service ([employer]) and trying to cook more. I advised pt to be weary of meal prep places as you do now know how much salt they are using.  Pt states she is only eating x1/meal per day from the service.  I will send low sodium resources via e-mail.    2.Physical Activity:   Pt reports she is still exercising x3/wk between yoga and dance classes.    3.Medication Therapy: N/A   Pt does not want to start meds, trying to get her lifestyle under control, but will consider if she must.    4.Patient has the following medication side effects/concerns: None.  (Frequency/Alleviating factors/Precipitating factors, etc.)     Follow up with Ms. Katarina Munguia completed. No further questions or concerns. Will continue follow up to achieve health goals.  Patient is not meeting the goal, 138/91 mmHg exceeds <130/80 mmHg.  Will plan to follow up in 3 weeks.    "

## 2018-09-25 ENCOUNTER — PATIENT OUTREACH (OUTPATIENT)
Dept: OTHER | Facility: OTHER | Age: 33
End: 2018-09-25

## 2018-09-25 DIAGNOSIS — I10 ESSENTIAL HYPERTENSION: Primary | ICD-10-CM

## 2018-09-25 NOTE — LETTER
October 16, 2018     Katarina Munguia  [address] [place] [id number]       Dear Katarina,    Thank you for enrolling in the [facility]. To participate in our programs, we ask that you submit weekly home readings through your [facility] account and maintain regular contact with your Care Team.  We have not received any data or heard from you in some time.     The Digital Medicine Care Team has attempted to reach you on multiple occasions to determine if you would like to continue participating in the program. While we encourage you to continue participating fully, we understand that circumstances may change.      To continue participating in the program, please contact me at [phone]. If we do not hear back, you will be un-enrolled and your physician will be notified of your decision.    If you have submitted home readings readings during the past 1 days and believe you are receiving this letter in error, please call the Digital Medicine Patient Support Line at [phone] for troubleshooting.      We look forward to hearing from you soon.    Sincerely,     Jody Del Castillo  [facility]

## 2018-09-25 NOTE — PROGRESS NOTES
Last 5 Patient Entered Readings                                      Current 30 Day Average: 138/93     Recent Readings 9/16/2018 9/10/2018 9/6/2018 9/1/2018 8/31/2018    SBP (mmHg) 135 132 140 149 133    DBP (mmHg) 83 90 91 98 103    Pulse 78 82 81 73 72      LMFCB to elevated pressures and need to start irbesartan 75mg daily or chlorthalidone.

## 2018-10-02 ENCOUNTER — PATIENT OUTREACH (OUTPATIENT)
Dept: OTHER | Facility: OTHER | Age: 33
End: 2018-10-02

## 2018-10-02 NOTE — PROGRESS NOTES
Last 5 Patient Entered Readings                                      Current 30 Day Average: 136/88     Recent Readings 9/16/2018 9/10/2018 9/6/2018 9/1/2018 8/31/2018    SBP (mmHg) 135 132 140 149 133    DBP (mmHg) 83 90 91 98 103    Pulse 78 82 81 73 72            Digital Medicine: Health  Follow Up    10/2: Left voicemail to follow up with Ms. Katarina Munguia.  Current BP average 136/88 mmHg is not at goal, <130/80 mmHg.  Will send MyChart about lack of readings.

## 2018-10-16 NOTE — PROGRESS NOTES
Last 5 Patient Entered Readings                                      Current 30 Day Average: 130/84     Recent Readings 10/15/2018 10/10/2018 9/25/2018 9/16/2018 9/10/2018    SBP (mmHg) 120 134 129 135 132    DBP (mmHg) 82 87 85 83 90    Pulse 68 75 72 78 82            Digital Medicine: Health  Follow Up    10/16: Left voicemail to follow up with Ms. Katarina Munguia.  Current BP average 130/84 mmHg is not at goal, <130/80 mmHg.  Patient did send [employer] message and explain lack of readings. Will follow up in 1 week.

## 2018-10-23 RX ORDER — CHLORTHALIDONE 25 MG/1
TABLET ORAL
Qty: 30 TABLET | Refills: 3 | Status: SHIPPED | OUTPATIENT
Start: 2018-10-23 | End: 2018-11-16 | Stop reason: SDUPTHER

## 2018-10-23 NOTE — PROGRESS NOTES
"Last 5 Patient Entered Readings                                      Current 30 Day Average: 131/87     Recent Readings 10/21/2018 10/15/2018 10/10/2018 9/25/2018 9/16/2018    SBP (mmHg) 140 120 134 129 135    DBP (mmHg) 95 82 87 85 83    Pulse 67 68 75 72 78          Digital Medicine: Health  Follow Up    10/23: Left voicemail to follow up with Ms. Katarina Munguia.  Current BP average 131/87 mmHg is not at goal, <130/80 mmHg.  Will send [employer] message. Pt called back      Digital Medicine: Health  Follow Up    Lifestyle Modifications:    1.Dietary Modifications (Sodium intake <2,000mg/day, food labels, dining out):   Pt reports making some good changes within her diet.  Pt is still using "meal prep" service mon-fri to help with having "good meals" already prepared for her.  Pt also reports limit sodium drastically and preparing fresh foods at her home.  I encouraged pt to keep up the great work and continue to monitor her sodium intake ever more.  Pt verbally understood.    2.Physical Activity:   Pt reports doing more walking and implementing "trying to get to a certain number of steps per day".  Pt states she has been trying to get to 10k steps/day but "it has been tough".  I advised pt to start off trying to get 6 or 7k and then once there to re-assess and set a higher goal.  Pt verbally understood.     3.Medication Therapy:   N/A    4.Patient has the following medication side effects/concerns: None  (Frequency/Alleviating factors/Precipitating factors, etc.)     Follow up with Ms. Katarina Munguia completed. No further questions or concerns. Will continue to follow up to achieve health goals.  Patient is currently not at goal, 131/87 mmHg does exceed <130/80 mmHg.    "

## 2018-10-23 NOTE — PROGRESS NOTES
Last 5 Patient Entered Readings                                      Current 30 Day Average: 131/87     Recent Readings 10/21/2018 10/15/2018 10/10/2018 9/25/2018 9/16/2018    SBP (mmHg) 140 120 134 129 135    DBP (mmHg) 95 82 87 85 83    Pulse 67 68 75 72 78      Patient's BP average is above goal of <130/80.     Patient denies s/s of hypotension (lightheadedness, dizziness, nausea, fatigue) associated with low readings. Instructed patient to inform me if this occurs, patient confirms understanding.      Patient denies s/s of hypertension (SOB, CP, severe headaches, changes in vision) associated with high readings. Instructed patient to go to the ED if BP > 180/110 and accompanied by hypertensive s/s, patient confirms understanding.    Current HTN regimen: Patient is not taking any blood pressure medication at this time.    I am adding chlorthalidone 12.5mg daily to help improve her blood pressure. She will have labs 11/24. Her goal is to reduce BP medication.    Will continue to monitor regularly. Will follow up in 2-3 weeks, sooner if BP begins to trend upward or downward.    Patient has my contact information and knows to call with any concerns or clinical changes.

## 2018-11-06 ENCOUNTER — PATIENT OUTREACH (OUTPATIENT)
Dept: OTHER | Facility: OTHER | Age: 33
End: 2018-11-06

## 2018-11-06 NOTE — PROGRESS NOTES
Last 5 Patient Entered Readings                                      Current 30 Day Average: 136/90     Recent Readings 11/5/2018 10/27/2018 10/21/2018 10/15/2018 10/10/2018    SBP (mmHg) 140 145 140 120 134    DBP (mmHg) 94 92 95 82 87    Pulse 83 92 67 68 75        Patient's BP average is above goal of <130/80.     Patient denies s/s of hypotension (lightheadedness, dizziness, nausea, fatigue) associated with low readings. Instructed patient to inform me if this occurs, patient confirms understanding.      Patient denies s/s of hypertension (SOB, CP, severe headaches, changes in vision) associated with high readings. Instructed patient to go to the ED if BP > 180/110 and accompanied by hypertensive s/s, patient confirms understanding.    Patient did not start chlorthalidone 12.5mg until 11/1. She has labs scheduled 11/24.    Will continue to monitor regularly. Will follow up in 2-3 weeks, sooner if BP begins to trend upward or downward.    Patient has my contact information and knows to call with any concerns or clinical changes.     Current HTN regimen:  Hypertension Medications             chlorthalidone (HYGROTEN) 25 MG Tab Take half tablet by mouth daily and may increase to 25mg daily after your lab work.

## 2018-11-15 ENCOUNTER — PATIENT OUTREACH (OUTPATIENT)
Dept: OTHER | Facility: OTHER | Age: 33
End: 2018-11-15

## 2018-11-15 DIAGNOSIS — I10 ESSENTIAL HYPERTENSION: Primary | ICD-10-CM

## 2018-11-15 NOTE — PROGRESS NOTES
Last 5 Patient Entered Readings                                      Current 30 Day Average: 139/94     Recent Readings 11/15/2018 11/14/2018 11/14/2018 11/14/2018 11/5/2018    SBP (mmHg) 133 135 149 136 140    DBP (mmHg) 86 87 106 98 94    Pulse 74 68 66 88 83      LMFCB to see how she is tolerating chlorthalidone. Her labs are scheduled 11/24. Her BP hasn't responded therefore I would recommend adding valsartan 80mg daily.    11/16:    Patient's BP average is above goal of <130/80.     Patient denies s/s of hypotension (lightheadedness, dizziness, nausea, fatigue) associated with low readings. Instructed patient to inform me if this occurs, patient confirms understanding.      Patient denies s/s of hypertension (SOB, CP, severe headaches, changes in vision) associated with high readings. Instructed patient to go to the ED if BP > 180/110 and accompanied by hypertensive s/s, patient confirms understanding.    Patient's BP hasn't improved since starting chlorthalidone 12.5mg. She will increase chlorthalidone to 25mg daily and will have a BMP 11/24. I will inform her of her lab results then will follow-up two weeks after starting chlorthalidone 25mg daily.    Will continue to monitor regularly. Will follow up in 2-3 weeks, sooner if BP begins to trend upward or downward.    Patient has my contact information and knows to call with any concerns or clinical changes.     Current HTN regimen:  Hypertension Medications             chlorthalidone (HYGROTEN) 25 MG Tab Take half tablet by mouth daily and may increase to 25mg daily after your lab work.

## 2018-11-16 RX ORDER — CHLORTHALIDONE 25 MG/1
25 TABLET ORAL DAILY
Qty: 30 TABLET | Refills: 3
Start: 2018-11-16 | End: 2019-07-16 | Stop reason: SDUPTHER

## 2018-11-26 ENCOUNTER — PATIENT OUTREACH (OUTPATIENT)
Dept: OTHER | Facility: OTHER | Age: 33
End: 2018-11-26

## 2018-11-26 ENCOUNTER — LAB VISIT (OUTPATIENT)
Dept: LAB | Facility: HOSPITAL | Age: 33
End: 2018-11-26
Attending: INTERNAL MEDICINE
Payer: COMMERCIAL

## 2018-11-26 DIAGNOSIS — I10 ESSENTIAL HYPERTENSION: ICD-10-CM

## 2018-11-26 DIAGNOSIS — I10 ESSENTIAL HYPERTENSION: Primary | ICD-10-CM

## 2018-11-26 LAB
ANION GAP SERPL CALC-SCNC: 9 MMOL/L
BUN SERPL-MCNC: 14 MG/DL
CALCIUM SERPL-MCNC: 10 MG/DL
CHLORIDE SERPL-SCNC: 102 MMOL/L
CO2 SERPL-SCNC: 26 MMOL/L
CREAT SERPL-MCNC: 0.7 MG/DL
EST. GFR  (AFRICAN AMERICAN): >60 ML/MIN/1.73 M^2
EST. GFR  (NON AFRICAN AMERICAN): >60 ML/MIN/1.73 M^2
GLUCOSE SERPL-MCNC: 84 MG/DL
POTASSIUM SERPL-SCNC: 4 MMOL/L
SODIUM SERPL-SCNC: 137 MMOL/L

## 2018-11-26 PROCEDURE — 80048 BASIC METABOLIC PNL TOTAL CA: CPT

## 2018-11-26 PROCEDURE — 36415 COLL VENOUS BLD VENIPUNCTURE: CPT

## 2018-11-26 NOTE — PROGRESS NOTES
Last 5 Patient Entered Readings                                      Current 30 Day Average: 130/90     Recent Readings 11/23/2018 11/19/2018 11/17/2018 11/15/2018 11/14/2018    SBP (mmHg) 117 129 113 133 135    DBP (mmHg) 83 83 79 86 87    Pulse 72 81 83 74 68        Patient's BP average is above goal of <130/80 however she is trending down nicely.    Patient denies s/s of hypotension (lightheadedness, dizziness, nausea, fatigue) associated with low readings. Instructed patient to inform me if this occurs, patient confirms understanding.      Patient denies s/s of hypertension (SOB, CP, severe headaches, changes in vision) associated with high readings. Instructed patient to go to the ED if BP > 180/110 and accompanied by hypertensive s/s, patient confirms understanding.    Patient and I agreed to increase the frequency of her BP readings for the next two weeks before making medication adjustments. If her BP remains elevated, I will add amlodipine.    Will continue to monitor regularly. Will follow up in 2-3 weeks, sooner if BP begins to trend upward or downward.    Patient has my contact information and knows to call with any concerns or clinical changes.     Current HTN regimen:  Hypertension Medications             chlorthalidone (HYGROTEN) 25 MG Tab Take 1 tablet (25 mg total) by mouth once daily.

## 2018-11-30 ENCOUNTER — PATIENT OUTREACH (OUTPATIENT)
Dept: OTHER | Facility: OTHER | Age: 33
End: 2018-11-30

## 2018-11-30 NOTE — PROGRESS NOTES
Last 5 Patient Entered Readings                                      Current 30 Day Average: 128/90     Recent Readings 11/23/2018 11/19/2018 11/17/2018 11/15/2018 11/14/2018    SBP (mmHg) 117 129 113 133 135    DBP (mmHg) 83 83 79 86 87    Pulse 72 81 83 74 68            Digital Medicine: Health  Follow Up    11/30: Left voicemail to follow up with Ms. Katarina Munguia.  Current BP average 128/90 mmHg is not at goal, <130/80 mmHg.

## 2018-12-18 ENCOUNTER — PATIENT OUTREACH (OUTPATIENT)
Dept: OTHER | Facility: OTHER | Age: 33
End: 2018-12-18

## 2018-12-18 NOTE — PROGRESS NOTES
Last 5 Patient Entered Readings                                      Current 30 Day Average: 133/88     Recent Readings 12/15/2018 12/15/2018 12/5/2018 12/3/2018 11/23/2018    SBP (mmHg) 179 149 114 126 117    DBP (mmHg) 110 90 68 94 83    Pulse 86 79 89 69 72      Not available to speak today due to being at work. Will follow-up after the holidays.

## 2018-12-18 NOTE — LETTER
January 18, 2019     Katarina Munguia  [address] [place] [id number]       Dear Katarina,    Thank you for enrolling in the [facility]. To participate in our programs, we ask that you submit weekly home readings through your [facility] account and maintain regular contact with your Care Team.  We have not received any data or heard from you in some time.     The Digital Medicine Care Team has attempted to reach you on multiple occasions to determine if you would like to continue participating in the program. While we encourage you to continue participating fully, we understand that circumstances may change.      To continue participating in the program, please contact me at [phone]. If we do not hear back, you will be un-enrolled and your physician will be notified of your decision.    If you have submitted home readings readings during the past 3 days and believe you are receiving this letter in error, please call the Digital Medicine Patient Support Line at [phone] for troubleshooting.      We look forward to hearing from you soon.    Sincerely,     Jody Del Castillo  [facility]

## 2019-01-08 ENCOUNTER — PATIENT OUTREACH (OUTPATIENT)
Dept: OTHER | Facility: OTHER | Age: 34
End: 2019-01-08

## 2019-01-08 NOTE — PROGRESS NOTES
Last 5 Patient Entered Readings                                      Current 30 Day Average: 144/95     Recent Readings 1/2/2019 12/29/2018 12/26/2018 12/15/2018 12/15/2018    SBP (mmHg) 138 127 131 179 149    DBP (mmHg) 92 88 93 110 90    Pulse 90 84 107 86 79            Digital Medicine: Health  Follow Up    1/8: Left voicemail to follow up with Ms. Katarina Munguia.  Current BP average 144/95 mmHg is not at goal, <130/80 mmHg.

## 2019-01-24 ENCOUNTER — OFFICE VISIT (OUTPATIENT)
Dept: FAMILY MEDICINE | Facility: CLINIC | Age: 34
End: 2019-01-24
Payer: COMMERCIAL

## 2019-01-24 VITALS
OXYGEN SATURATION: 96 % | BODY MASS INDEX: 37.87 KG/M2 | HEIGHT: 65 IN | DIASTOLIC BLOOD PRESSURE: 88 MMHG | WEIGHT: 227.31 LBS | RESPIRATION RATE: 17 BRPM | SYSTOLIC BLOOD PRESSURE: 144 MMHG | HEART RATE: 82 BPM | TEMPERATURE: 98 F

## 2019-01-24 DIAGNOSIS — I10 ESSENTIAL HYPERTENSION: ICD-10-CM

## 2019-01-24 DIAGNOSIS — B97.89 ACUTE VIRAL SINUSITIS: Primary | ICD-10-CM

## 2019-01-24 DIAGNOSIS — J01.90 ACUTE VIRAL SINUSITIS: Primary | ICD-10-CM

## 2019-01-24 PROCEDURE — 3008F BODY MASS INDEX DOCD: CPT | Mod: CPTII,S$GLB,, | Performed by: FAMILY MEDICINE

## 2019-01-24 PROCEDURE — 3079F PR MOST RECENT DIASTOLIC BLOOD PRESSURE 80-89 MM HG: ICD-10-PCS | Mod: CPTII,S$GLB,, | Performed by: FAMILY MEDICINE

## 2019-01-24 PROCEDURE — 3077F SYST BP >= 140 MM HG: CPT | Mod: CPTII,S$GLB,, | Performed by: FAMILY MEDICINE

## 2019-01-24 PROCEDURE — 96372 PR INJECTION,THERAP/PROPH/DIAG2ST, IM OR SUBCUT: ICD-10-PCS | Mod: S$GLB,,, | Performed by: FAMILY MEDICINE

## 2019-01-24 PROCEDURE — 99999 PR PBB SHADOW E&M-EST. PATIENT-LVL III: CPT | Mod: PBBFAC,,, | Performed by: FAMILY MEDICINE

## 2019-01-24 PROCEDURE — 3077F PR MOST RECENT SYSTOLIC BLOOD PRESSURE >= 140 MM HG: ICD-10-PCS | Mod: CPTII,S$GLB,, | Performed by: FAMILY MEDICINE

## 2019-01-24 PROCEDURE — 3008F PR BODY MASS INDEX (BMI) DOCUMENTED: ICD-10-PCS | Mod: CPTII,S$GLB,, | Performed by: FAMILY MEDICINE

## 2019-01-24 PROCEDURE — 96372 THER/PROPH/DIAG INJ SC/IM: CPT | Mod: S$GLB,,, | Performed by: FAMILY MEDICINE

## 2019-01-24 PROCEDURE — 3079F DIAST BP 80-89 MM HG: CPT | Mod: CPTII,S$GLB,, | Performed by: FAMILY MEDICINE

## 2019-01-24 PROCEDURE — 99214 OFFICE O/P EST MOD 30 MIN: CPT | Mod: 25,S$GLB,, | Performed by: FAMILY MEDICINE

## 2019-01-24 PROCEDURE — 99214 PR OFFICE/OUTPT VISIT, EST, LEVL IV, 30-39 MIN: ICD-10-PCS | Mod: 25,S$GLB,, | Performed by: FAMILY MEDICINE

## 2019-01-24 PROCEDURE — 99999 PR PBB SHADOW E&M-EST. PATIENT-LVL III: ICD-10-PCS | Mod: PBBFAC,,, | Performed by: FAMILY MEDICINE

## 2019-01-24 RX ORDER — FLUTICASONE PROPIONATE 50 MCG
2 SPRAY, SUSPENSION (ML) NASAL DAILY
Qty: 16 G | Refills: 0 | Status: ON HOLD | OUTPATIENT
Start: 2019-01-24 | End: 2019-11-29 | Stop reason: CLARIF

## 2019-01-24 RX ORDER — RIFAMPIN 300 MG/1
300 CAPSULE ORAL DAILY
COMMUNITY
Start: 2018-12-27 | End: 2019-08-19

## 2019-01-24 NOTE — PROGRESS NOTES
Chief Complaint   Patient presents with    Cold Exposure     6 days     Establish Care       HPI    Katarina Munguia is 33 y.o. female. The primary encounter diagnosis was Acute viral sinusitis. A diagnosis of Essential hypertension was also pertinent to this visit.    33 year old female with HTN comes to clinic with complaint of of upper respiratory symptoms.    Patient reports sore throat that began about 1 week ago accomapwith nasal and sinus congestion.  She reports feeling of left ear fullness, bilateral eye swelling, mild sore throat, and productive cough.  She denies chest tightness.  HTN - patient notes that she has not taken her blood pressure medications today.  She has also been using Nyquil which may contain a decongestant.    She reports using Nyquil, Mucinex, and other multisymptom formula.     Review of Systems   Constitutional: Positive for fatigue. Negative for activity change, chills, diaphoresis and fever.   HENT: Positive for congestion, ear pain, postnasal drip, rhinorrhea and sore throat. Negative for ear discharge, sinus pressure and sinus pain.    Respiratory: Positive for cough. Negative for chest tightness, shortness of breath and wheezing.    Cardiovascular: Negative for chest pain.   Musculoskeletal: Negative for gait problem.   Psychiatric/Behavioral: Negative for suicidal ideas.           Current Outpatient Medications:     chlorthalidone (HYGROTEN) 25 MG Tab, Take 1 tablet (25 mg total) by mouth once daily., Disp: 30 tablet, Rfl: 3    MICROGESTIN FE 1.5/30, 28, 1.5 mg-30 mcg (21)/75 mg (7) tablet, , Disp: , Rfl:     multivitamin capsule, Take 1 capsule by mouth once daily., Disp: , Rfl:     rifAMpin (RIFADIN) 300 MG capsule, Take 300 mg by mouth once daily., Disp: , Rfl:     fluticasone (FLONASE) 50 mcg/actuation nasal spray, 2 sprays (100 mcg total) by Each Nare route once daily., Disp: 16 g, Rfl: 0  No current facility-administered medications for this visit.       Blood  "pressure (!) 144/88, pulse 82, temperature 98.3 °F (36.8 °C), temperature source Oral, resp. rate 17, height 5' 5" (1.651 m), weight 103.1 kg (227 lb 4.7 oz), SpO2 96 %.    Physical Exam   Constitutional: Vital signs are normal. She appears well-developed and well-nourished. She appears ill. No distress.   HENT:   Head: Head is without right periorbital erythema and without left periorbital erythema.   Right Ear: Tympanic membrane is bulging. No middle ear effusion.   Left Ear: Tympanic membrane is bulging.  No middle ear effusion.   Nose: Nose normal.   Mouth/Throat: Oropharyngeal exudate and posterior oropharyngeal erythema present.   Bilateral periorbital edema   Cardiovascular: Normal heart sounds.   No murmur heard.  Pulmonary/Chest: Effort normal and breath sounds normal.   Psychiatric: She has a normal mood and affect. Her behavior is normal.       Lab Visit on 11/26/2018   Component Date Value Ref Range Status    Sodium 11/26/2018 137  136 - 145 mmol/L Final    Potassium 11/26/2018 4.0  3.5 - 5.1 mmol/L Final    Chloride 11/26/2018 102  95 - 110 mmol/L Final    CO2 11/26/2018 26  23 - 29 mmol/L Final    Glucose 11/26/2018 84  70 - 110 mg/dL Final    BUN, Bld 11/26/2018 14  6 - 20 mg/dL Final    Creatinine 11/26/2018 0.7  0.5 - 1.4 mg/dL Final    Calcium 11/26/2018 10.0  8.7 - 10.5 mg/dL Final    Anion Gap 11/26/2018 9  8 - 16 mmol/L Final    eGFR if African American 11/26/2018 >60  >60 mL/min/1.73 m^2 Final    eGFR if non African American 11/26/2018 >60  >60 mL/min/1.73 m^2 Final   ]    Assessment:    1. Acute viral sinusitis    2. Essential hypertension          Katarina was seen today for cold exposure and establish care.    Diagnoses and all orders for this visit:    Acute viral sinusitis  -     methylPREDNISolone sod suc(PF) injection 125 mg  -     fluticasone (FLONASE) 50 mcg/actuation nasal spray; 2 sprays (100 mcg total) by Each Nare route once daily.  - New problem. Administer steroid " injection. Continue Flonase.    Essential hypertension   -Unstable. Patient to take medication and counseled on avoiding decongestants.        FOLLOW UP: Follow-up in about 1 week (around 1/31/2019), or if symptoms worsen or fail to improve.

## 2019-01-24 NOTE — PROGRESS NOTES
SoluMedrol 125mg injection given. Tolerated well, instructed to wait 15 min for observation. No reaction noted at discharge.

## 2019-01-24 NOTE — PATIENT INSTRUCTIONS
Acute Sinusitis    Acute sinusitis is irritation and swelling of the sinuses. It is usually caused by a viral infection after a common cold. Your doctor can help you find relief.  What is acute sinusitis?  Sinuses are air-filled spaces in the skull behind the face. They are kept moist and clean by a lining of mucosa. Things such as pollen, smoke, and chemical fumes can irritate the mucosa. It can then swell up. As a response to irritation, the mucosa makes more mucus and other fluids. Tiny hairlike cilia cover the mucosa. Cilia help carry mucus toward the opening of the sinus. Too much mucus may cause the cilia to stop working. This blocks the sinus opening. A buildup of fluid in the sinuses then causes pain and pressure. It can also encourage bacteria to grow in the sinuses.  Common symptoms of acute sinusitis  You may have:  · Facial soreness pain  · Headache  · Fever  · Fluid draining in the back of the throat (postnasal drip)  · Congestion  · Drainage that is thick and colored, instead of clear  · Cough  Diagnosing acute sinusitis  Your doctor will ask about your symptoms and health history. He or she will look at your ear, nose, and throat. You usually won't need to have X-rays taken.    The doctor may take a sample of mucus to check for bacteria. If you have sinusitis that keeps coming back, you may need imaging tests such as X-rays or CAT scans. This will help your doctor check for a structural problem that may be causing the infection.  Treating acute sinusitis  Treatment is aimed at unblocking the sinus opening and helping the cilia work again. You may need to take antihistamine and decongestant medicine. These can reduce inflammation and decrease the amount of fluid your sinuses make. If you have a bacterial infection, you will need to take antibiotic medicine for 10 to 14 days. Take this medicine until it is gone, even if you feel better.  Date Last Reviewed: 10/1/2016  © 8477-1429 [employer] [address] [place] [id number]. All rights reserved. This information is not intended as a substitute for professional medical care. Always follow your healthcare professional's instructions.

## 2019-01-25 NOTE — PROGRESS NOTES
Last 5 Patient Entered Readings                                      Current 30 Day Average: 131/91     Recent Readings 1/25/2019 1/15/2019 1/2/2019 12/29/2018 12/26/2018    SBP (mmHg) 141 127 138 127 131    DBP (mmHg) 95 92 92 88 93    Pulse 78 67 90 84 107        HPI:  Patient returned my call to discuss her blood pressure. Patient's father passed away last month causing stress and attributing to her high blood pressure. Patient endorses adherence to medication regimen. Patient denies hypotensive s/sx (lightheadedness, dizziness, nausea, fatigue); patient denies hypertensive s/sx (SOB, CP, severe headaches, changes in vision, dizziness, fatigue, confusion, anxiety, nosebleeds). Instructed patient to seek medical care if BP > 180/110 and is accompanied by hypertensive s/sx associated, patient confirms understanding.     Assessment:  Reviewed recent readings. Per 2017 ACC/ AHA HTN guidelines (goal of BP < 130/80), current 30-day average needs to be addressed more thoroughly today.     Plan:  Continue current medication regimen. Patient would like to work on lifestyle for the next three weeks before making a medication adjustment. I will continue to monitor regularly and will follow-up in 2 to 3 weeks, sooner if blood pressure begins to trend upward or downward.     Current medication regimen:  Hypertension Medications             chlorthalidone (HYGROTEN) 25 MG Tab Take 1 tablet (25 mg total) by mouth once daily.        Patient denies having questions or concerns. Patient has my contact information and knows to call with any concerns or clinical changes.

## 2019-02-01 NOTE — PROGRESS NOTES
Last 5 Patient Entered Readings                                      Current 30 Day Average: 136/90     Recent Readings 1/28/2019 1/26/2019 1/25/2019 1/15/2019 1/2/2019    SBP (mmHg) 137 136 141 127 138    DBP (mmHg) 93 78 95 92 92    Pulse 104 86 78 67 90          Digital Medicine: Health  Follow Up    2/1: Left voicemail to follow up with Ms. Katarina Munguia.  Current BP average 136/90 mmHg is not at goal, <130/80 mmHg.  Sending [employer] message.

## 2019-02-04 NOTE — PROGRESS NOTES
Last 5 Patient Entered Readings                                      Current 30 Day Average: 136/89     Recent Readings 2/3/2019 2/2/2019 1/28/2019 1/26/2019 1/25/2019    SBP (mmHg) 129 148 137 136 141    DBP (mmHg) 84 89 93 78 95    Pulse 82 95 104 86 78        2/4: Patient called back and left VM.  Returned call and left VM.

## 2019-02-08 NOTE — PROGRESS NOTES
Last 5 Patient Entered Readings                                      Current 30 Day Average: 133/88     Recent Readings 2/7/2019 2/5/2019 2/3/2019 2/2/2019 1/28/2019    SBP (mmHg) 116 130 129 148 137    DBP (mmHg) 85 89 84 89 93    Pulse 70 70 82 95 104          Digital Medicine: Health  Follow Up    2/8: Left voicemail to follow up with Ms. Katarina Munguia.  Current BP average 133/88 mmHg is not at goal, <130/80 mmHg.

## 2019-02-22 NOTE — PROGRESS NOTES
Last 5 Patient Entered Readings                                      Current 30 Day Average: 128/86     Recent Readings 2/21/2019 2/21/2019 2/20/2019 2/18/2019 2/17/2019    SBP (mmHg) 126 119 123 125 122    DBP (mmHg) 86 85 85 85 87    Pulse 100 89 81 80 66          Digital Medicine: Health  Follow Up    2/22: Left voicemail to follow up with Ms. Katarina Munguia.  Current BP average 128/86 mmHg is not  at goal, <130/80 mmHg.  If no answer/respone at next outreach, will send NC letter.

## 2019-02-25 NOTE — PROGRESS NOTES
"Last 5 Patient Entered Readings                                      Current 30 Day Average: 127/85     Recent Readings 2/21/2019 2/21/2019 2/20/2019 2/18/2019 2/17/2019    SBP (mmHg) 126 119 123 125 122    DBP (mmHg) 86 85 85 85 87    Pulse 100 89 81 80 66          2/25: Patient called back on 2/22 and left VM.  Returned phone call and left VM.   Patient called back.    Digital Medicine: Health  Follow Up    Lifestyle Modifications:    1.Dietary Modifications (Sodium intake <2,000mg/day, food labels, dining out):   Patient reports she is now meal prepping.  Patient states she does grocery shopping on Sunday and prepare all of her meals.  Patient reports snacking on "carrots and hummus, nuts, or fruit".  Gave praises and encouragement to patient to keep up the great work and stay consistent.  Also encouraged patient to continue to monitor salt intake.  Patient verbally understood.     2.Physical Activity:   Patient reports she has been exercising x4/wk for 30min.  Patient reports she also got an apple watch.  Patient reports the watch has really motivated her to start moving more and keeps her accountable.  Patient reports linking her watch to her [employer] account.  Gave praises and encouragement to patient to keep up the great work.      3.Medication Therapy:   Patient has been compliant with the medication regimen.  Patient states she was not very complaint with her medication in the past but has been complaint recently.     4.Patient has the following medication side effects/concerns: None  (Frequency/Alleviating factors/Precipitating factors, etc.)     Follow up with Ms. Katarina Munguia completed. No further questions or concerns. Will continue to follow up to achieve health goals.  Patient is currently not at goal, 127/85 mmHg does exceed <130/80 mmHg.    "

## 2019-03-08 ENCOUNTER — PATIENT OUTREACH (OUTPATIENT)
Dept: OTHER | Facility: OTHER | Age: 34
End: 2019-03-08

## 2019-03-08 DIAGNOSIS — I10 ESSENTIAL HYPERTENSION: Primary | ICD-10-CM

## 2019-03-08 NOTE — PROGRESS NOTES
Last 5 Patient Entered Readings                                      Current 30 Day Average: 122/85     Recent Readings 2/21/2019 2/21/2019 2/20/2019 2/18/2019 2/17/2019    SBP (mmHg) 126 119 123 125 122    DBP (mmHg) 86 85 85 85 87    Pulse 100 89 81 80 66      LMFCB to discuss increasing the frequency of her BP readings and discuss her upward trend in BP.

## 2019-03-08 NOTE — LETTER
April 23, 2019     Katarina Munguia  [address] [place] [id number]       Dear Katarina,    Thank you for enrolling in [facility]. To participate, we ask that you submit information at least once weekly through your [facility] account and maintain regular contact with your Care Team. We have not received any data or heard from you in some time.     The Digital Medicine Care Team has attempted to reach you on multiple occasions to determine if you would like to continue participating in the program. While we encourage you to continue participating fully, we understand that circumstances may change.     To continue participating in the program, please contact me at . If we do not hear back, you will be un-enrolled, and your physician will be notified of your decision.    If you have submitted data and believe you are receiving this letter in error, please call the Digital Medicine Patient Support Line at [phone] for troubleshooting.      We look forward to hearing from you soon.    Sincerely,     Rodri Shafer  [facility]

## 2019-04-05 ENCOUNTER — PATIENT OUTREACH (OUTPATIENT)
Dept: OTHER | Facility: OTHER | Age: 34
End: 2019-04-05

## 2019-04-05 NOTE — PROGRESS NOTES
Last 5 Patient Entered Readings                                      Current 30 Day Average: 128/86     Recent Readings 3/26/2019 3/26/2019 3/12/2019 2/21/2019 2/21/2019    SBP (mmHg) 131 128 124 126 119    DBP (mmHg) 89 84 85 86 85    Pulse 94 79 72 100 89            Digital Medicine: Health  Follow Up    4/5: Left voicemail to follow up with Ms. Katarina Munguia.  Current BP average 128/86 mmHg is not at goal, <130/80 mmHg.

## 2019-04-12 NOTE — PROGRESS NOTES
Last 5 Patient Entered Readings                                      Current 30 Day Average: 131/86     Recent Readings 4/7/2019 3/26/2019 3/26/2019 3/12/2019 2/21/2019    SBP (mmHg) 131 131 128 124 126    DBP (mmHg) 84 89 84 85 86    Pulse 69 94 79 72 100          Digital Medicine: Health  Follow Up    4/12: Left voicemail to follow up with Ms. Katarina Munguia.  Current BP average 131/86 mmHg is not at goal, <130/80 mmHg.  Sending [employer] message.

## 2019-04-23 ENCOUNTER — TELEPHONE (OUTPATIENT)
Dept: GASTROENTEROLOGY | Facility: CLINIC | Age: 34
End: 2019-04-23

## 2019-04-23 NOTE — TELEPHONE ENCOUNTER
----- Message from Chhaya Hobson sent at 4/23/2019  1:39 PM CDT -----  Contact: self [phone]  Patient Returning Call from [facility]    Who Left Message for Patient: Pt states Priti called her, but I didn't see anything charted in the system. I sent message to Dr. Painter in basket due to her having a np appt w/ him   Communication Preference: self [phone]  Additional Information:

## 2019-04-23 NOTE — TELEPHONE ENCOUNTER
Patrice Marcos,  Did you call this patient ? Patient states she has issues with Heartbrn,belching, and possible ulcers

## 2019-04-26 NOTE — PROGRESS NOTES
Last 5 Patient Entered Readings                                      Current 30 Day Average: 133/87     Recent Readings 4/26/2019 4/16/2019 4/7/2019 3/26/2019 3/26/2019    SBP (mmHg) 132 135 131 131 128    DBP (mmHg) 89 88 84 89 84    Pulse 97 87 69 94 79        4/26: PharmD has started NC protocol due to lack of communication.  Will push outreach until further notice.

## 2019-05-17 RX ORDER — AMLODIPINE BESYLATE 5 MG/1
5 TABLET ORAL DAILY
Qty: 30 TABLET | Refills: 3 | Status: SHIPPED | OUTPATIENT
Start: 2019-05-17 | End: 2019-12-23 | Stop reason: SDUPTHER

## 2019-05-17 NOTE — PROGRESS NOTES
Last 5 Patient Entered Readings                                      Current 30 Day Average: 122/86     Recent Readings 5/8/2019 5/7/2019 4/26/2019 4/16/2019 4/7/2019    SBP (mmHg) 118 115 132 135 131    DBP (mmHg) 89 80 89 88 84    Pulse 96 91 97 87 69        HPI:  Called patient to follow up on her high diastolic.   Patient endorses adherence to medication regimen.   She is currently working out five days a week.  Patient denies hypotensive s/sx (lightheadedness, dizziness, nausea, fatigue); patient denies hypertensive s/sx (SOB, CP, severe headaches, changes in vision). Instructed patient to seek medical care if BP > 180/110 and is accompanied by hypertensive s/sx associated, patient confirms understanding.     Assessment:  Reviewed recent readings. Per 2017 ACC/ AHA HTN guidelines (goal of BP < 130/80), current 30-day average is well controlled.     Plan:  Add amlodipine 5mg daily.  Continue exercise regimen.   I will continue to monitor regularly and will follow-up in 2 to 3 weeks, sooner if blood pressure begins to trend upward or downward.     Current medication regimen:  Hypertension Medications             chlorthalidone (HYGROTEN) 25 MG Tab Take 1 tablet (25 mg total) by mouth once daily.          Patient denies having questions or concerns. Patient has my contact information and knows to call with any concerns or clinical changes.

## 2019-05-24 NOTE — PROGRESS NOTES
Last 5 Patient Entered Readings                                      Current 30 Day Average: 118/82     Recent Readings 5/23/2019 5/21/2019 5/8/2019 5/7/2019 4/26/2019    SBP (mmHg) 114 113 118 115 132    DBP (mmHg) 75 76 89 80 89    Pulse 67 83 96 91 97        5/24: Left voicemail to follow up with Ms. Katarina Munguia.  Current BP average 118/82 mmHg is not at goal, <130/80 mmHg.  PharmD spoke with patient on 5/17, will continue to follow up.   Patient called back.      Digital Medicine: Health  Follow Up    Lifestyle Modifications:    1.Dietary Modifications (Sodium intake <2,000mg/day, food labels, dining out):   Patient reports she has been meal prepping at home.  Patient reports she has been watching her salt intake.  Patient reports she has been drinking less.  Gave encouragement to patient to stay consistent with her changes.    2.Physical Activity:   Patient reports she has continued to stay active. Encouraged patient to keep up the great work.     3.Medication Therapy:   Patient has been compliant with the medication regimen.  Confirmed that patient NOT started new medication regimen as prescribed by Digital Medicine Clinician.  Patient states she was not comfortable with starting it and wanted to continue working on lifestyle changes.  Will notify PharmD.    4.Patient has the following medication side effects/concerns: None  (Frequency/Alleviating factors/Precipitating factors, etc.)     Follow up with Ms. Katarina Munguia completed. No further questions or concerns. Will continue to follow up to achieve health goals.  Patient is currently not at goal, 118/82 mmHg does exceed <130/80 mmHg.

## 2019-05-29 ENCOUNTER — PATIENT OUTREACH (OUTPATIENT)
Dept: OTHER | Facility: OTHER | Age: 34
End: 2019-05-29

## 2019-05-29 NOTE — PROGRESS NOTES
Last 5 Patient Entered Readings                                      Current 30 Day Average: 113/78     Recent Readings 5/27/2019 5/23/2019 5/21/2019 5/8/2019 5/7/2019    SBP (mmHg) 106 114 113 118 115    DBP (mmHg) 72 75 76 89 80    Pulse 67 67 83 96 91      [facility] to discuss how she is tolerating amlodipine. She mentioned to her HC that she wasn't comfortable starting on 5/24 and she wanted to work on lifestyle. Her BP is coming down nicely so if it's lifestyle, I won't start amlodipine.

## 2019-06-20 ENCOUNTER — PATIENT MESSAGE (OUTPATIENT)
Dept: ADMINISTRATIVE | Facility: OTHER | Age: 34
End: 2019-06-20

## 2019-06-20 NOTE — PROGRESS NOTES
Last 5 Patient Entered Readings                                      Current 30 Day Average: 116/75     Recent Readings 6/12/2019 6/6/2019 5/27/2019 5/23/2019 5/21/2019    SBP (mmHg) 111 136 106 114 113    DBP (mmHg) 72 81 72 75 76    Pulse 88 70 67 67 83        HPI:  Called patient to follow up since starting amlodipine.   Patient endorses adherence to medication regimen.   Patient denies hypotensive s/sx (lightheadedness, dizziness, nausea, fatigue); patient denies hypertensive s/sx (SOB, CP, severe headaches, changes in vision). Instructed patient to seek medical care if BP > 180/110 and is accompanied by hypertensive s/sx associated, patient confirms understanding.     Assessment:  Reviewed recent readings. Per 2017 ACC/ AHA HTN guidelines (goal of BP < 130/80), current 30-day average is well controlled.     Plan:  Continue current medication regimen.   Continue to work on lifestyle.   I will continue to monitor regularly and will follow-up in 2 to 3 weeks, sooner if blood pressure begins to trend upward or downward.     Current medication regimen:  Hypertension Medications             amLODIPine (NORVASC) 5 MG tablet Take 1 tablet (5 mg total) by mouth once daily. (HOLD)    chlorthalidone (HYGROTEN) 25 MG Tab Take 1 tablet (25 mg total) by mouth once daily.          Patient denies having questions or concerns. Patient has my contact information and knows to call with any concerns or clinical changes.

## 2019-07-05 ENCOUNTER — PATIENT OUTREACH (OUTPATIENT)
Dept: OTHER | Facility: OTHER | Age: 34
End: 2019-07-05

## 2019-07-05 NOTE — PROGRESS NOTES
Last 5 Patient Entered Readings                                      Current 30 Day Average: 121/79     Recent Readings 6/23/2019 6/12/2019 6/6/2019 5/27/2019 5/23/2019    SBP (mmHg) 116 111 136 106 114    DBP (mmHg) 84 72 81 72 75    Pulse 72 88 70 67 67          Digital Medicine: Health  Follow Up    7/5: Left voicemail to follow up with Ms. Katarina Munguia.  Current BP average 121/79 mmHg is not at goal, <130/80 mmHg.  No readings 6/23, will send [employer] message.

## 2019-07-16 DIAGNOSIS — I10 ESSENTIAL HYPERTENSION: ICD-10-CM

## 2019-07-16 RX ORDER — CHLORTHALIDONE 25 MG/1
25 TABLET ORAL DAILY
Qty: 30 TABLET | Refills: 3 | Status: SHIPPED | OUTPATIENT
Start: 2019-07-16 | End: 2019-12-23 | Stop reason: SDUPTHER

## 2019-07-19 NOTE — PROGRESS NOTES
Last 5 Patient Entered Readings                                      Current 30 Day Average: 120/84     Recent Readings 7/15/2019 7/11/2019 6/23/2019 6/12/2019 6/6/2019    SBP (mmHg) 122 121 116 111 136    DBP (mmHg) 83 84 84 72 81    Pulse 92 59 72 88 70          Digital Medicine: Health  Follow Up    7/19: Left voicemail to follow up with Ms. Katarina Munguia.  Current BP average 120/84 mmHg is not at goal, <130/80 mmHg.  Patient has submitted readings since last encounter.  Sending [employer] message.  Will try work number as well at next encounter.

## 2019-07-29 ENCOUNTER — PATIENT OUTREACH (OUTPATIENT)
Dept: OTHER | Facility: OTHER | Age: 34
End: 2019-07-29

## 2019-07-29 NOTE — PROGRESS NOTES
Last 5 Patient Entered Readings                                      Current 30 Day Average: 122/84     Recent Readings 7/23/2019 7/15/2019 7/11/2019 6/23/2019 6/12/2019    SBP (mmHg) 124 122 121 116 111    DBP (mmHg) 86 83 84 84 72    Pulse 74 92 59 72 88        LMFCB to discuss her upward diastolic and consider resuming amlodipine 5mg daily.

## 2019-08-02 NOTE — PROGRESS NOTES
Last 5 Patient Entered Readings                                      Current 30 Day Average: 122/84     Recent Readings 7/23/2019 7/15/2019 7/11/2019 6/23/2019 6/12/2019    SBP (mmHg) 124 122 121 116 111    DBP (mmHg) 86 83 84 84 72    Pulse 74 92 59 72 88          Digital Medicine: Health  Follow Up    8/2: Left voicemail to follow up with Ms. Katarina Munguia.  Called both numbers.  Current BP average 122/84 mmHg is not at goal, <130/80 mmHg.

## 2019-08-14 NOTE — PROGRESS NOTES
Last 5 Patient Entered Readings                                      Current 30 Day Average: 124/85     Recent Readings 8/14/2019 8/4/2019 7/23/2019 7/15/2019 7/11/2019    SBP (mmHg) 128 120 124 122 121    DBP (mmHg) 88 83 86 83 84    Pulse 92 63 74 92 59      HPI:  Called patient to follow up on her elevated diastolic.   Patient endorses adherence to medication regimen.   Patient denies hypotensive s/sx (lightheadedness, dizziness, nausea, fatigue); patient denies hypertensive s/sx (SOB, CP, severe headaches, changes in vision). Instructed patient to seek medical care if BP > 180/110 and is accompanied by hypertensive s/sx associated, patient confirms understanding.     Assessment:  Reviewed recent readings. Per 2017 ACC/ AHA HTN guidelines (goal of BP < 130/80), current 30-day average needs to be addressed more thoroughly today.     Plan:  Resume amlodipine 5mg daily.   I will continue to monitor regularly and will follow-up in 2 to 3 weeks, sooner if blood pressure begins to trend upward or downward.     Current medication regimen:  Hypertension Medications             amLODIPine (NORVASC) 5 MG tablet Take 1 tablet (5 mg total) by mouth once daily.    chlorthalidone (HYGROTEN) 25 MG Tab Take 1 tablet (25 mg total) by mouth once daily.          Patient denies having questions or concerns. Patient has my contact information and knows to call with any concerns or clinical changes.

## 2019-08-15 ENCOUNTER — CLINICAL SUPPORT (OUTPATIENT)
Dept: INTERNAL MEDICINE | Facility: CLINIC | Age: 34
End: 2019-08-15
Payer: COMMERCIAL

## 2019-08-15 DIAGNOSIS — Z00.00 ROUTINE GENERAL MEDICAL EXAMINATION AT A HEALTH CARE FACILITY: Primary | ICD-10-CM

## 2019-08-15 PROCEDURE — 99412 PR PREVENT COUNSEL,GROUP,60 MIN: ICD-10-PCS | Mod: S$GLB,,, | Performed by: DIETITIAN, REGISTERED

## 2019-08-15 PROCEDURE — 99412 PREVENTIVE COUNSELING GROUP: CPT | Mod: S$GLB,,, | Performed by: DIETITIAN, REGISTERED

## 2019-08-16 NOTE — PROGRESS NOTES
Last 5 Patient Entered Readings                                      Current 30 Day Average: 125/85     Recent Readings 8/16/2019 8/14/2019 8/4/2019 7/23/2019 7/15/2019    SBP (mmHg) 126 128 120 124 122    DBP (mmHg) 84 88 83 86 83    Pulse 81 92 63 74 92        8/16: PharmD spoke with patient on 8/14 and is scheduled to follow up.  Will push outreach 2-3 weeks.

## 2019-08-19 ENCOUNTER — OFFICE VISIT (OUTPATIENT)
Dept: GASTROENTEROLOGY | Facility: CLINIC | Age: 34
End: 2019-08-19
Payer: COMMERCIAL

## 2019-08-19 ENCOUNTER — LAB VISIT (OUTPATIENT)
Dept: LAB | Facility: HOSPITAL | Age: 34
End: 2019-08-19
Attending: INTERNAL MEDICINE
Payer: COMMERCIAL

## 2019-08-19 VITALS
SYSTOLIC BLOOD PRESSURE: 124 MMHG | WEIGHT: 233.69 LBS | DIASTOLIC BLOOD PRESSURE: 72 MMHG | HEART RATE: 82 BPM | BODY MASS INDEX: 38.89 KG/M2

## 2019-08-19 DIAGNOSIS — R10.13 DYSPEPSIA: Primary | ICD-10-CM

## 2019-08-19 DIAGNOSIS — R10.13 EPIGASTRIC PAIN: ICD-10-CM

## 2019-08-19 DIAGNOSIS — R10.13 DYSPEPSIA: ICD-10-CM

## 2019-08-19 PROCEDURE — 99999 PR PBB SHADOW E&M-EST. PATIENT-LVL III: CPT | Mod: PBBFAC,,, | Performed by: INTERNAL MEDICINE

## 2019-08-19 PROCEDURE — 3078F DIAST BP <80 MM HG: CPT | Mod: CPTII,S$GLB,, | Performed by: INTERNAL MEDICINE

## 2019-08-19 PROCEDURE — 86677 HELICOBACTER PYLORI ANTIBODY: CPT

## 2019-08-19 PROCEDURE — 3008F BODY MASS INDEX DOCD: CPT | Mod: CPTII,S$GLB,, | Performed by: INTERNAL MEDICINE

## 2019-08-19 PROCEDURE — 99204 PR OFFICE/OUTPT VISIT, NEW, LEVL IV, 45-59 MIN: ICD-10-PCS | Mod: S$GLB,,, | Performed by: INTERNAL MEDICINE

## 2019-08-19 PROCEDURE — 99999 PR PBB SHADOW E&M-EST. PATIENT-LVL III: ICD-10-PCS | Mod: PBBFAC,,, | Performed by: INTERNAL MEDICINE

## 2019-08-19 PROCEDURE — 99204 OFFICE O/P NEW MOD 45 MIN: CPT | Mod: S$GLB,,, | Performed by: INTERNAL MEDICINE

## 2019-08-19 PROCEDURE — 3008F PR BODY MASS INDEX (BMI) DOCUMENTED: ICD-10-PCS | Mod: CPTII,S$GLB,, | Performed by: INTERNAL MEDICINE

## 2019-08-19 PROCEDURE — 3074F SYST BP LT 130 MM HG: CPT | Mod: CPTII,S$GLB,, | Performed by: INTERNAL MEDICINE

## 2019-08-19 PROCEDURE — 3074F PR MOST RECENT SYSTOLIC BLOOD PRESSURE < 130 MM HG: ICD-10-PCS | Mod: CPTII,S$GLB,, | Performed by: INTERNAL MEDICINE

## 2019-08-19 PROCEDURE — 36415 COLL VENOUS BLD VENIPUNCTURE: CPT

## 2019-08-19 PROCEDURE — 3078F PR MOST RECENT DIASTOLIC BLOOD PRESSURE < 80 MM HG: ICD-10-PCS | Mod: CPTII,S$GLB,, | Performed by: INTERNAL MEDICINE

## 2019-08-19 RX ORDER — PANTOPRAZOLE SODIUM 40 MG/1
40 TABLET, DELAYED RELEASE ORAL DAILY
Qty: 30 TABLET | Refills: 0 | Status: SHIPPED | OUTPATIENT
Start: 2019-08-19 | End: 2019-09-23 | Stop reason: SDUPTHER

## 2019-08-19 NOTE — PROGRESS NOTES
Subjective:       Patient ID: Katarina Munguia is a 34 y.o. female.    Chief Complaint: Gastroesophageal Reflux and Advice Only    This is a 34-year-old female here for evaluation of dyspepsia and epigastric pain.  The symptoms have been occurring for over a year described as a burning sensation in the upper abdomen.  Is relatively focal, can occur with eating heavier meals but can occur to a lesser intensity without.  Some nausea but no vomiting.  Minimal NSAID usage.  No melena, hematochezia, dark urine, jaundice.  No family history of gastrointestinal malignancy.  She takes Tums frequently with a mild improvement.  No particular dietary relation.  No changes in bowel habits.  Denies abdominal surgeries.    The following portions of the patient's history were reviewed and updated as appropriate: allergies, current medications, past family history, past medical history, past social history, past surgical history and problem list.    (Portions of this note were dictated using voice recognition software and may contain dictation related errors in spelling/grammar/syntax not found on text review)  HPI  Review of Systems   Constitutional: Negative for appetite change, chills and fever.   HENT: Negative for postnasal drip and trouble swallowing.    Eyes: Negative for pain and redness.   Respiratory: Negative for cough, choking, chest tightness and shortness of breath.    Cardiovascular: Negative for chest pain and leg swelling.   Gastrointestinal: Positive for abdominal distention and abdominal pain. Negative for anal bleeding, blood in stool, constipation, diarrhea, nausea, rectal pain and vomiting.   Endocrine: Negative for cold intolerance and heat intolerance.   Genitourinary: Negative for difficulty urinating and hematuria.   Musculoskeletal: Negative for arthralgias and back pain.   Skin: Negative for color change and pallor.   Allergic/Immunologic: Negative for environmental allergies and food allergies.    Neurological: Negative for dizziness and light-headedness.   Hematological: Negative for adenopathy. Does not bruise/bleed easily.   Psychiatric/Behavioral: Negative for agitation and behavioral problems.       Objective:      Physical Exam   Constitutional: She is oriented to person, place, and time. She appears well-developed and well-nourished. No distress.   HENT:   Head: Normocephalic and atraumatic.   Eyes: Conjunctivae are normal. No scleral icterus.   Neck: Normal range of motion. Neck supple. No tracheal deviation present. No thyromegaly present.   Cardiovascular: Normal rate and regular rhythm. Exam reveals no gallop and no friction rub.   Pulmonary/Chest: Effort normal and breath sounds normal. No respiratory distress. She has no wheezes.   Abdominal: Soft. Bowel sounds are normal. She exhibits no distension. There is no tenderness.   Musculoskeletal:        Right wrist: She exhibits normal range of motion and no tenderness.        Left wrist: She exhibits normal range of motion and no tenderness.   Lymphadenopathy:        Head (right side): No submental and no submandibular adenopathy present.        Head (left side): No submental and no submandibular adenopathy present.   Neurological: She is alert and oriented to person, place, and time.   Skin: Skin is warm and dry. No rash noted. She is not diaphoretic. No erythema.   Psychiatric: She has a normal mood and affect. Her behavior is normal.   Nursing note and vitals reviewed.      Labs; reviewed  Assessment:       1. Dyspepsia    2. Epigastric pain        Plan:   1. H pylori serology  2. PPI  3. 5-6 week f/u  4. Abd u/s

## 2019-08-21 LAB — H PYLORI IGG SERPL QL IA: NEGATIVE

## 2019-08-22 ENCOUNTER — CLINICAL SUPPORT (OUTPATIENT)
Dept: INTERNAL MEDICINE | Facility: CLINIC | Age: 34
End: 2019-08-22
Payer: COMMERCIAL

## 2019-08-22 DIAGNOSIS — Z00.00 ROUTINE GENERAL MEDICAL EXAMINATION AT A HEALTH CARE FACILITY: Primary | ICD-10-CM

## 2019-08-22 PROCEDURE — 99412 PR PREVENT COUNSEL,GROUP,60 MIN: ICD-10-PCS | Mod: S$GLB,,, | Performed by: DIETITIAN, REGISTERED

## 2019-08-22 PROCEDURE — 99412 PREVENTIVE COUNSELING GROUP: CPT | Mod: S$GLB,,, | Performed by: DIETITIAN, REGISTERED

## 2019-08-28 ENCOUNTER — PATIENT OUTREACH (OUTPATIENT)
Dept: OTHER | Facility: OTHER | Age: 34
End: 2019-08-28

## 2019-08-28 NOTE — PROGRESS NOTES
Last 5 Patient Entered Readings                                      Current 30 Day Average: 124/85     Recent Readings 8/27/2019 8/22/2019 8/16/2019 8/16/2019 8/14/2019    SBP (mmHg) 121 131 122 126 128    DBP (mmHg) 84 86 84 84 88    Pulse 84 103 84 81 92        LMFCB to discuss how she is tolerating amlodipine. She needs to reach out to her HC.

## 2019-08-29 ENCOUNTER — CLINICAL SUPPORT (OUTPATIENT)
Dept: INTERNAL MEDICINE | Facility: CLINIC | Age: 34
End: 2019-08-29
Payer: COMMERCIAL

## 2019-08-29 DIAGNOSIS — Z00.00 ROUTINE GENERAL MEDICAL EXAMINATION AT A HEALTH CARE FACILITY: Primary | ICD-10-CM

## 2019-08-29 PROCEDURE — 99412 PR PREVENT COUNSEL,GROUP,60 MIN: ICD-10-PCS | Mod: S$GLB,,, | Performed by: DIETITIAN, REGISTERED

## 2019-08-29 PROCEDURE — 99412 PREVENTIVE COUNSELING GROUP: CPT | Mod: S$GLB,,, | Performed by: DIETITIAN, REGISTERED

## 2019-08-29 NOTE — PROGRESS NOTES
Last 5 Patient Entered Readings                                      Current 30 Day Average: 122/84     Recent Readings 8/28/2019 8/27/2019 8/22/2019 8/16/2019 8/16/2019    SBP (mmHg) 110 121 131 122 126    DBP (mmHg) 80 84 86 84 84    Pulse 92 84 103 84 81      HPI:  Called patient to follow up since starting amlodipine. She is getting headaches since starting amlodipine. She would like to continue her current medications at this time.   Patient endorses adherence to medication regimen.   Patient denies hypotensive s/sx (lightheadedness, dizziness, nausea, fatigue); patient denies hypertensive s/sx (SOB, CP, severe headaches, changes in vision). Instructed patient to seek medical care if BP > 180/110 and is accompanied by hypertensive s/sx associated, patient confirms understanding.     Assessment:  Reviewed recent readings. Per 2017 ACC/ AHA HTN guidelines (goal of BP < 130/80), current 30-day average needs to be addressed more thoroughly today. She is showing improvement since adding amlodipine.     Plan:  Continue current medication regimen.   She has her annual PCP appointment 9/20.  I will continue to monitor regularly and will follow-up in 2 to 3 weeks, sooner if blood pressure begins to trend upward or downward.     Current medication regimen:  Hypertension Medications             amLODIPine (NORVASC) 5 MG tablet Take 1 tablet (5 mg total) by mouth once daily.    chlorthalidone (HYGROTEN) 25 MG Tab Take 1 tablet (25 mg total) by mouth once daily.          Patient denies having questions or concerns. Patient has my contact information and knows to call with any concerns or clinical changes.

## 2019-08-30 ENCOUNTER — HOSPITAL ENCOUNTER (OUTPATIENT)
Dept: RADIOLOGY | Facility: HOSPITAL | Age: 34
Discharge: HOME | End: 2019-08-30
Attending: INTERNAL MEDICINE
Payer: COMMERCIAL

## 2019-08-30 DIAGNOSIS — R10.13 EPIGASTRIC PAIN: ICD-10-CM

## 2019-08-30 PROCEDURE — 76700 US EXAM ABDOM COMPLETE: CPT | Mod: 26,,, | Performed by: RADIOLOGY

## 2019-08-30 PROCEDURE — 76700 US ABDOMEN COMPLETE: ICD-10-PCS | Mod: 26,,, | Performed by: RADIOLOGY

## 2019-08-30 PROCEDURE — 76700 US EXAM ABDOM COMPLETE: CPT | Mod: TC

## 2019-08-30 NOTE — PROGRESS NOTES
"Last 5 Patient Entered Readings                                      Current 30 Day Average: 122/84     Recent Readings 8/28/2019 8/27/2019 8/22/2019 8/16/2019 8/16/2019    SBP (mmHg) 110 121 131 122 126    DBP (mmHg) 80 84 86 84 84    Pulse 92 84 103 84 81        Digital Medicine: Health  Follow Up    Patient spoke with PharmD on 8/29 about medications.    Lifestyle Modifications:    1.Dietary Modifications (Sodium intake <2,000mg/day, food labels, dining out):   Patient reports she has been drinking 7 bottles of water/day.  Patient reports she is making a lot of changes.  Gave praises and encouragement to patient.     2.Physical Activity:   Patient reports she joined "[facility] at [facility]".  Patient reports she lost 5lbs and even placed third in a step challenge.  Patient reports she has been enjoying it and is feeling motivated.  Gave praises and encouragement to patient.     3.Medication Therapy:   Patient has been compliant with the medication regimen.  Confirmed that patient started new medication regimen as prescribed by Digital Medicine Clinician.    4.Patient has the following medication side effects/concerns: None  (Frequency/Alleviating factors/Precipitating factors, etc.)     Follow up with MsLucie Katarinamelecio Munguia completed. No further questions or concerns. Will continue to follow up to achieve health goals.  Patient is currently not at goal, 122/84 mmHg does exceed <130/80 mmHg.        "

## 2019-09-05 ENCOUNTER — CLINICAL SUPPORT (OUTPATIENT)
Dept: INTERNAL MEDICINE | Facility: CLINIC | Age: 34
End: 2019-09-05
Payer: COMMERCIAL

## 2019-09-05 ENCOUNTER — PATIENT MESSAGE (OUTPATIENT)
Dept: INTERNAL MEDICINE | Facility: CLINIC | Age: 34
End: 2019-09-05

## 2019-09-05 DIAGNOSIS — Z00.00 ROUTINE GENERAL MEDICAL EXAMINATION AT A HEALTH CARE FACILITY: Primary | ICD-10-CM

## 2019-09-05 DIAGNOSIS — R30.0 DYSURIA: Primary | ICD-10-CM

## 2019-09-05 PROCEDURE — 99412 PREVENTIVE COUNSELING GROUP: CPT | Mod: S$GLB,,, | Performed by: DIETITIAN, REGISTERED

## 2019-09-05 PROCEDURE — 99412 PR PREVENT COUNSEL,GROUP,60 MIN: ICD-10-PCS | Mod: S$GLB,,, | Performed by: DIETITIAN, REGISTERED

## 2019-09-12 ENCOUNTER — CLINICAL SUPPORT (OUTPATIENT)
Dept: INTERNAL MEDICINE | Facility: CLINIC | Age: 34
End: 2019-09-12
Payer: COMMERCIAL

## 2019-09-12 DIAGNOSIS — Z00.00 ROUTINE GENERAL MEDICAL EXAMINATION AT A HEALTH CARE FACILITY: Primary | ICD-10-CM

## 2019-09-12 PROCEDURE — 99412 PREVENTIVE COUNSELING GROUP: CPT | Mod: S$GLB,,, | Performed by: DIETITIAN, REGISTERED

## 2019-09-12 PROCEDURE — 99412 PR PREVENT COUNSEL,GROUP,60 MIN: ICD-10-PCS | Mod: S$GLB,,, | Performed by: DIETITIAN, REGISTERED

## 2019-09-13 NOTE — PROGRESS NOTES
Messaged patient to assess if her headaches have improved with amlodipine. Her BP is now controlled.    She replied stating she changed her diet and has had an eight pound weight loss. She is feeling much better and no longer having headaches.     Congratulated patient on her now at goal readings and if she maintains weight loss how she could come off HTN medications.

## 2019-09-18 ENCOUNTER — PATIENT MESSAGE (OUTPATIENT)
Dept: ADMINISTRATIVE | Facility: OTHER | Age: 34
End: 2019-09-18

## 2019-09-19 ENCOUNTER — CLINICAL SUPPORT (OUTPATIENT)
Dept: INTERNAL MEDICINE | Facility: CLINIC | Age: 34
End: 2019-09-19
Payer: COMMERCIAL

## 2019-09-19 DIAGNOSIS — Z00.00 ROUTINE GENERAL MEDICAL EXAMINATION AT A HEALTH CARE FACILITY: Primary | ICD-10-CM

## 2019-09-19 PROCEDURE — 99412 PR PREVENT COUNSEL,GROUP,60 MIN: ICD-10-PCS | Mod: S$GLB,,, | Performed by: DIETITIAN, REGISTERED

## 2019-09-19 PROCEDURE — 99412 PREVENTIVE COUNSELING GROUP: CPT | Mod: S$GLB,,, | Performed by: DIETITIAN, REGISTERED

## 2019-09-20 ENCOUNTER — OFFICE VISIT (OUTPATIENT)
Dept: INTERNAL MEDICINE | Facility: CLINIC | Age: 34
End: 2019-09-20
Payer: COMMERCIAL

## 2019-09-20 ENCOUNTER — IMMUNIZATION (OUTPATIENT)
Dept: PHARMACY | Facility: CLINIC | Age: 34
End: 2019-09-20

## 2019-09-20 VITALS
HEART RATE: 81 BPM | SYSTOLIC BLOOD PRESSURE: 120 MMHG | HEIGHT: 65 IN | DIASTOLIC BLOOD PRESSURE: 78 MMHG | BODY MASS INDEX: 37.8 KG/M2 | WEIGHT: 226.88 LBS | OXYGEN SATURATION: 98 %

## 2019-09-20 DIAGNOSIS — E78.5 HYPERLIPIDEMIA, UNSPECIFIED HYPERLIPIDEMIA TYPE: ICD-10-CM

## 2019-09-20 DIAGNOSIS — K76.0 FATTY LIVER: ICD-10-CM

## 2019-09-20 DIAGNOSIS — K21.9 GASTROESOPHAGEAL REFLUX DISEASE WITHOUT ESOPHAGITIS: ICD-10-CM

## 2019-09-20 DIAGNOSIS — Z85.820 HISTORY OF MELANOMA: ICD-10-CM

## 2019-09-20 DIAGNOSIS — Z00.00 ANNUAL PHYSICAL EXAM: Primary | ICD-10-CM

## 2019-09-20 DIAGNOSIS — R30.0 DYSURIA: ICD-10-CM

## 2019-09-20 DIAGNOSIS — I10 ESSENTIAL HYPERTENSION: ICD-10-CM

## 2019-09-20 DIAGNOSIS — L72.9 SKIN CYST: ICD-10-CM

## 2019-09-20 LAB
BACTERIA #/AREA URNS AUTO: NORMAL /HPF
BILIRUB UR QL STRIP: NEGATIVE
CLARITY UR REFRACT.AUTO: ABNORMAL
COLOR UR AUTO: YELLOW
GLUCOSE UR QL STRIP: NEGATIVE
HGB UR QL STRIP: ABNORMAL
KETONES UR QL STRIP: NEGATIVE
LEUKOCYTE ESTERASE UR QL STRIP: NEGATIVE
MICROSCOPIC COMMENT: NORMAL
NITRITE UR QL STRIP: NEGATIVE
PH UR STRIP: 6 [PH] (ref 5–8)
PROT UR QL STRIP: NEGATIVE
RBC #/AREA URNS AUTO: 1 /HPF (ref 0–4)
SP GR UR STRIP: 1.02 (ref 1–1.03)
SQUAMOUS #/AREA URNS AUTO: 1 /HPF
URN SPEC COLLECT METH UR: ABNORMAL
WBC #/AREA URNS AUTO: 0 /HPF (ref 0–5)

## 2019-09-20 PROCEDURE — 99999 PR PBB SHADOW E&M-EST. PATIENT-LVL III: CPT | Mod: PBBFAC,,, | Performed by: INTERNAL MEDICINE

## 2019-09-20 PROCEDURE — 87086 URINE CULTURE/COLONY COUNT: CPT

## 2019-09-20 PROCEDURE — 81001 URINALYSIS AUTO W/SCOPE: CPT

## 2019-09-20 PROCEDURE — 99395 PR PREVENTIVE VISIT,EST,18-39: ICD-10-PCS | Mod: S$GLB,,, | Performed by: INTERNAL MEDICINE

## 2019-09-20 PROCEDURE — 3074F PR MOST RECENT SYSTOLIC BLOOD PRESSURE < 130 MM HG: ICD-10-PCS | Mod: CPTII,S$GLB,, | Performed by: INTERNAL MEDICINE

## 2019-09-20 PROCEDURE — 99999 PR PBB SHADOW E&M-EST. PATIENT-LVL III: ICD-10-PCS | Mod: PBBFAC,,, | Performed by: INTERNAL MEDICINE

## 2019-09-20 PROCEDURE — 3074F SYST BP LT 130 MM HG: CPT | Mod: CPTII,S$GLB,, | Performed by: INTERNAL MEDICINE

## 2019-09-20 PROCEDURE — 3078F DIAST BP <80 MM HG: CPT | Mod: CPTII,S$GLB,, | Performed by: INTERNAL MEDICINE

## 2019-09-20 PROCEDURE — 3078F PR MOST RECENT DIASTOLIC BLOOD PRESSURE < 80 MM HG: ICD-10-PCS | Mod: CPTII,S$GLB,, | Performed by: INTERNAL MEDICINE

## 2019-09-20 PROCEDURE — 99395 PREV VISIT EST AGE 18-39: CPT | Mod: S$GLB,,, | Performed by: INTERNAL MEDICINE

## 2019-09-20 NOTE — PROGRESS NOTES
Internal Medicine    Subjective:      Patient ID: Katarina Munguia is a 34 y.o. female.    Chief Complaint: Annual Exam    HPI:  Patient presents for follow up appointment.  The patient's last visit with me was on 8/3/2018.    Recent UTI;  Treated for UTI (dysuria and increased frequency).  Took Macrobid 100mg BID x 3 days from her ObGyn.  Symptoms improved but now has discomfort after urinating.    HTN:  Taking Norvasc 5mg daily and chlorthalidone 25mg daily.  BP stable today.  She is working on losing weight and would like to come off of Norvasc soon.    HLD:  Currently not on medication.  Total cholesterol 271 and .  Working on weight loss.  Due for repeat lipids.     Fatty liver:  By ultrasound on 8/30/19.  LFT's normal.      BMI 37:  Doing weight loss program through [facility].  On week 6 of 12.  Has lost 7 pounds.      GERD:  Taking Protonix 40mg daily.  Would like to try coming off in the future.      H/o nasal congestion:  Uses Flonase daily, which is helpful.     H/o melanoma:  Followed by Dermatology, Dr. Howard - gets annual screens.     ObGyn:  Dr. Mas at [facility].         Review of Systems   Constitutional: Negative for activity change, appetite change, chills, fatigue, fever and unexpected weight change.   HENT: Negative for hearing loss, rhinorrhea, sore throat and trouble swallowing.    Eyes: Negative for discharge and visual disturbance.   Respiratory: Negative for cough, chest tightness, shortness of breath and wheezing.    Cardiovascular: Negative for chest pain, palpitations and leg swelling.   Gastrointestinal: Negative for abdominal pain, blood in stool, constipation, diarrhea, nausea and vomiting.   Endocrine: Negative for polydipsia and polyuria.   Genitourinary: Positive for dysuria. Negative for decreased urine volume, difficulty urinating, flank pain, frequency, hematuria, menstrual problem and urgency.   Musculoskeletal: Negative for arthralgias, joint swelling, myalgias and neck  "pain.   Skin: Negative for rash and wound.   Neurological: Negative for dizziness, weakness, numbness and headaches.   Psychiatric/Behavioral: Negative for behavioral problems, confusion and dysphoric mood.       Past medical history, surgical history, and family medical history reviewed and updated as appropriate.    Medications and allergies reviewed.     Objective:     Vitals:    09/20/19 1029   BP: 120/78   BP Location: Left arm   Patient Position: Sitting   BP Method: Large (Manual)   Pulse: 81   SpO2: 98%   Weight: 102.9 kg (226 lb 13.7 oz)   Height: 5' 5" (1.651 m)     Physical Exam   Constitutional: She is oriented to person, place, and time. She appears well-developed and well-nourished. No distress.   HENT:   Head: Normocephalic and atraumatic.   Eyes: Conjunctivae and EOM are normal. No scleral icterus.   Neck: No thyromegaly present.   Cardiovascular: Normal rate and regular rhythm. Exam reveals no gallop and no friction rub.   No murmur heard.  Pulmonary/Chest: Effort normal and breath sounds normal. No respiratory distress. She has no wheezes. She has no rales.   Abdominal: Soft. She exhibits no distension. There is no tenderness. There is no guarding.   Musculoskeletal: She exhibits no edema or tenderness.   Lymphadenopathy:     She has no cervical adenopathy.   Neurological: She is alert and oriented to person, place, and time.   Skin: No rash noted. No erythema.   Small cyst under the skin on left buttock   Psychiatric: She has a normal mood and affect. Her behavior is normal.       RESULTS:   Lab Results   Component Value Date    WBC 8.31 06/01/2018    HGB 13.8 06/01/2018    HCT 40.6 06/01/2018    MCV 87 06/01/2018     06/01/2018     BMP  Lab Results   Component Value Date     11/26/2018    K 4.0 11/26/2018     11/26/2018    CO2 26 11/26/2018    BUN 14 11/26/2018    CREATININE 0.7 11/26/2018    CALCIUM 10.0 11/26/2018    ANIONGAP 9 11/26/2018    ESTGFRAFRICA >60 11/26/2018    " EGFRNONAA >60 11/26/2018     Lab Results   Component Value Date    ALT 27 06/01/2018    AST 19 06/01/2018    ALKPHOS 37 (L) 06/01/2018    BILITOT 0.8 06/01/2018     Lab Results   Component Value Date    TSH 0.839 06/01/2018     Lab Results   Component Value Date    HGBA1C 5.1 06/01/2018         Assessment:     1. Annual physical exam    2. Dysuria    3. Essential hypertension    4. Hyperlipidemia, unspecified hyperlipidemia type    5. Fatty liver    6. BMI 37.0-37.9, adult    7. History of melanoma    8. Skin cyst    9. Gastroesophageal reflux disease without esophagitis        Plan:     *Continue medication/plan as discussed in HPI except for changes discussed below.    Katarina was seen today for annual exam.    Diagnoses and all orders for this visit:    Annual physical exam  -     CBC auto differential; Future; Expected date: 09/20/2019  -     Comprehensive metabolic panel; Future; Expected date: 09/20/2019  -     Hemoglobin A1c; Future; Expected date: 09/20/2019  -     Lipid panel; Future; Expected date: 09/20/2019  -     TSH; Future; Expected date: 09/20/2019    Dysuria  -     Urine culture  -     Urinalysis    Essential hypertension    Hyperlipidemia, unspecified hyperlipidemia type  Fatty liver  BMI 37.0-37.9, adult   Working on weight loss.    History of melanoma    Skin cyst   Plans to show this to her Dermatologist as well at upcoming Annual.    Gastroesophageal reflux disease without esophagitis    Health maintenance reviewed with patient.     Follow up in about 6 months (around 3/20/2020).    Shilpa Sarabia MD  Internal Medicine  [facility]

## 2019-09-21 LAB
BACTERIA UR CULT: NORMAL
BACTERIA UR CULT: NORMAL

## 2019-09-23 ENCOUNTER — PATIENT MESSAGE (OUTPATIENT)
Dept: INTERNAL MEDICINE | Facility: CLINIC | Age: 34
End: 2019-09-23

## 2019-09-24 RX ORDER — PANTOPRAZOLE SODIUM 40 MG/1
40 TABLET, DELAYED RELEASE ORAL DAILY
Qty: 30 TABLET | Refills: 0 | Status: SHIPPED | OUTPATIENT
Start: 2019-09-24 | End: 2019-11-14

## 2019-09-26 ENCOUNTER — CLINICAL SUPPORT (OUTPATIENT)
Dept: INTERNAL MEDICINE | Facility: CLINIC | Age: 34
End: 2019-09-26
Payer: COMMERCIAL

## 2019-09-26 DIAGNOSIS — Z00.00 ROUTINE GENERAL MEDICAL EXAMINATION AT A HEALTH CARE FACILITY: Primary | ICD-10-CM

## 2019-09-26 PROCEDURE — 99412 PREVENTIVE COUNSELING GROUP: CPT | Mod: S$GLB,,, | Performed by: DIETITIAN, REGISTERED

## 2019-09-26 PROCEDURE — 99412 PR PREVENT COUNSEL,GROUP,60 MIN: ICD-10-PCS | Mod: S$GLB,,, | Performed by: DIETITIAN, REGISTERED

## 2019-09-27 DIAGNOSIS — M54.50 LOW BACK PAIN: Primary | ICD-10-CM

## 2019-10-03 ENCOUNTER — CLINICAL SUPPORT (OUTPATIENT)
Dept: INTERNAL MEDICINE | Facility: CLINIC | Age: 34
End: 2019-10-03
Payer: COMMERCIAL

## 2019-10-03 DIAGNOSIS — Z00.00 ROUTINE GENERAL MEDICAL EXAMINATION AT A HEALTH CARE FACILITY: Primary | ICD-10-CM

## 2019-10-03 PROCEDURE — 99412 PR PREVENT COUNSEL,GROUP,60 MIN: ICD-10-PCS | Mod: S$GLB,,, | Performed by: DIETITIAN, REGISTERED

## 2019-10-03 PROCEDURE — 99412 PREVENTIVE COUNSELING GROUP: CPT | Mod: S$GLB,,, | Performed by: DIETITIAN, REGISTERED

## 2019-10-04 ENCOUNTER — TELEPHONE (OUTPATIENT)
Dept: RADIOLOGY | Facility: HOSPITAL | Age: 34
End: 2019-10-04

## 2019-10-10 ENCOUNTER — CLINICAL SUPPORT (OUTPATIENT)
Dept: INTERNAL MEDICINE | Facility: CLINIC | Age: 34
End: 2019-10-10
Payer: COMMERCIAL

## 2019-10-10 DIAGNOSIS — Z00.00 ROUTINE GENERAL MEDICAL EXAMINATION AT A HEALTH CARE FACILITY: Primary | ICD-10-CM

## 2019-10-10 PROCEDURE — 99412 PREVENTIVE COUNSELING GROUP: CPT | Mod: S$GLB,,, | Performed by: DIETITIAN, REGISTERED

## 2019-10-10 PROCEDURE — 99412 PR PREVENT COUNSEL,GROUP,60 MIN: ICD-10-PCS | Mod: S$GLB,,, | Performed by: DIETITIAN, REGISTERED

## 2019-10-14 ENCOUNTER — PATIENT OUTREACH (OUTPATIENT)
Dept: ADMINISTRATIVE | Facility: OTHER | Age: 34
End: 2019-10-14

## 2019-10-16 ENCOUNTER — TELEPHONE (OUTPATIENT)
Dept: GASTROENTEROLOGY | Facility: CLINIC | Age: 34
End: 2019-10-16

## 2019-10-16 ENCOUNTER — OFFICE VISIT (OUTPATIENT)
Dept: GASTROENTEROLOGY | Facility: CLINIC | Age: 34
End: 2019-10-16
Payer: COMMERCIAL

## 2019-10-16 ENCOUNTER — PATIENT MESSAGE (OUTPATIENT)
Dept: GASTROENTEROLOGY | Facility: CLINIC | Age: 34
End: 2019-10-16

## 2019-10-16 VITALS
BODY MASS INDEX: 38.19 KG/M2 | WEIGHT: 229.5 LBS | HEART RATE: 72 BPM | DIASTOLIC BLOOD PRESSURE: 77 MMHG | SYSTOLIC BLOOD PRESSURE: 117 MMHG

## 2019-10-16 DIAGNOSIS — K76.0 FATTY LIVER: Primary | ICD-10-CM

## 2019-10-16 DIAGNOSIS — R10.13 DYSPEPSIA: ICD-10-CM

## 2019-10-16 PROCEDURE — 3008F PR BODY MASS INDEX (BMI) DOCUMENTED: ICD-10-PCS | Mod: CPTII,S$GLB,, | Performed by: INTERNAL MEDICINE

## 2019-10-16 PROCEDURE — 99999 PR PBB SHADOW E&M-EST. PATIENT-LVL III: CPT | Mod: PBBFAC,,, | Performed by: INTERNAL MEDICINE

## 2019-10-16 PROCEDURE — 99999 PR PBB SHADOW E&M-EST. PATIENT-LVL III: ICD-10-PCS | Mod: PBBFAC,,, | Performed by: INTERNAL MEDICINE

## 2019-10-16 PROCEDURE — 3074F PR MOST RECENT SYSTOLIC BLOOD PRESSURE < 130 MM HG: ICD-10-PCS | Mod: CPTII,S$GLB,, | Performed by: INTERNAL MEDICINE

## 2019-10-16 PROCEDURE — 3008F BODY MASS INDEX DOCD: CPT | Mod: CPTII,S$GLB,, | Performed by: INTERNAL MEDICINE

## 2019-10-16 PROCEDURE — 3078F PR MOST RECENT DIASTOLIC BLOOD PRESSURE < 80 MM HG: ICD-10-PCS | Mod: CPTII,S$GLB,, | Performed by: INTERNAL MEDICINE

## 2019-10-16 PROCEDURE — 99213 OFFICE O/P EST LOW 20 MIN: CPT | Mod: S$GLB,,, | Performed by: INTERNAL MEDICINE

## 2019-10-16 PROCEDURE — 99213 PR OFFICE/OUTPT VISIT, EST, LEVL III, 20-29 MIN: ICD-10-PCS | Mod: S$GLB,,, | Performed by: INTERNAL MEDICINE

## 2019-10-16 PROCEDURE — 3074F SYST BP LT 130 MM HG: CPT | Mod: CPTII,S$GLB,, | Performed by: INTERNAL MEDICINE

## 2019-10-16 PROCEDURE — 3078F DIAST BP <80 MM HG: CPT | Mod: CPTII,S$GLB,, | Performed by: INTERNAL MEDICINE

## 2019-10-16 NOTE — PROGRESS NOTES
Subjective:       Patient ID: Katarina Munguia is a 34 y.o. female.    Chief Complaint: Follow-up    This is a 34-year-old female here for a follow-up regarding dyspepsia.  She noted over 1 year symptoms which was described as an upper abdominal, generally daily burning sensation which was moderate in severity.  She endorsed only minimal NSAID usage and no warning signs.  It is a nonradiating symptoms and we started her on PPI therapy and checked an abdominal ultrasound.  The ultrasound showed fatty liver disease but was otherwise normal.  H pylori serology was negative.  She is doing very well on the PPI therapy and has now been on for 6 weeks with good resolution in her symptoms. She has been working with [facility] regarding dietary changes, exercise etc for weight loss. Minimal etoh usage only on weekends.     The following portions of the patient's history were reviewed and updated as appropriate: allergies, current medications, past family history, past medical history, past social history, past surgical history and problem list.    (Portions of this note were dictated using voice recognition software and may contain dictation related errors in spelling/grammar/syntax not found on text review)  HPI  Review of Systems   Constitutional: Negative for appetite change and unexpected weight change.   Respiratory: Negative for chest tightness and shortness of breath.    Cardiovascular: Negative for chest pain and palpitations.   Gastrointestinal: Negative for abdominal distention and abdominal pain.         Objective:      Physical Exam   Constitutional: She is oriented to person, place, and time. She appears well-developed and well-nourished. No distress.   HENT:   Head: Normocephalic and atraumatic.   Eyes: Conjunctivae are normal. No scleral icterus.   Pulmonary/Chest: Effort normal. No respiratory distress.   Musculoskeletal: Normal range of motion. She exhibits no deformity.   Neurological: She is  alert and oriented to person, place, and time.   Psychiatric: She has a normal mood and affect. Her behavior is normal. Thought content normal.   Nursing note and vitals reviewed.        Labs/imaging; reviewed  Assessment:       1. Fatty liver    2. Dyspepsia        Plan:   1. Fibroscan  2. D/c PPI after 8 weeks, monitor for symptom recurrence  3. Discussed TRAN in detail including recommendations

## 2019-10-17 ENCOUNTER — CLINICAL SUPPORT (OUTPATIENT)
Dept: INTERNAL MEDICINE | Facility: CLINIC | Age: 34
End: 2019-10-17
Payer: COMMERCIAL

## 2019-10-17 DIAGNOSIS — Z00.00 ROUTINE GENERAL MEDICAL EXAMINATION AT A HEALTH CARE FACILITY: Primary | ICD-10-CM

## 2019-10-17 PROCEDURE — 99412 PREVENTIVE COUNSELING GROUP: CPT | Mod: S$GLB,,, | Performed by: DIETITIAN, REGISTERED

## 2019-10-17 PROCEDURE — 99412 PR PREVENT COUNSEL,GROUP,60 MIN: ICD-10-PCS | Mod: S$GLB,,, | Performed by: DIETITIAN, REGISTERED

## 2019-10-24 ENCOUNTER — CLINICAL SUPPORT (OUTPATIENT)
Dept: INTERNAL MEDICINE | Facility: CLINIC | Age: 34
End: 2019-10-24
Payer: COMMERCIAL

## 2019-10-24 DIAGNOSIS — Z00.00 ROUTINE GENERAL MEDICAL EXAMINATION AT A HEALTH CARE FACILITY: Primary | ICD-10-CM

## 2019-10-24 PROCEDURE — 99412 PREVENTIVE COUNSELING GROUP: CPT | Mod: S$GLB,,, | Performed by: DIETITIAN, REGISTERED

## 2019-10-24 PROCEDURE — 99412 PR PREVENT COUNSEL,GROUP,60 MIN: ICD-10-PCS | Mod: S$GLB,,, | Performed by: DIETITIAN, REGISTERED

## 2019-10-28 ENCOUNTER — PATIENT OUTREACH (OUTPATIENT)
Dept: OTHER | Facility: OTHER | Age: 34
End: 2019-10-28

## 2019-10-31 ENCOUNTER — HOSPITAL ENCOUNTER (OUTPATIENT)
Dept: RADIOLOGY | Facility: HOSPITAL | Age: 34
Discharge: HOME | End: 2019-10-31
Attending: INTERNAL MEDICINE
Payer: COMMERCIAL

## 2019-10-31 ENCOUNTER — CLINICAL SUPPORT (OUTPATIENT)
Dept: INTERNAL MEDICINE | Facility: CLINIC | Age: 34
End: 2019-10-31
Payer: COMMERCIAL

## 2019-10-31 DIAGNOSIS — K76.0 FATTY LIVER: ICD-10-CM

## 2019-10-31 DIAGNOSIS — Z00.00 ROUTINE GENERAL MEDICAL EXAMINATION AT A HEALTH CARE FACILITY: Primary | ICD-10-CM

## 2019-10-31 PROCEDURE — 91200 LIVER ELASTOGRAPHY: CPT | Mod: TC

## 2019-10-31 PROCEDURE — 99412 PR PREVENT COUNSEL,GROUP,60 MIN: ICD-10-PCS | Mod: S$GLB,,, | Performed by: DIETITIAN, REGISTERED

## 2019-10-31 PROCEDURE — 99412 PREVENTIVE COUNSELING GROUP: CPT | Mod: S$GLB,,, | Performed by: DIETITIAN, REGISTERED

## 2019-10-31 PROCEDURE — 91200 LIVER ELASTOGRAPHY: CPT | Mod: 26,,, | Performed by: RADIOLOGY

## 2019-10-31 PROCEDURE — 91200 US ELASTOGRAPHY LIVER: ICD-10-PCS | Mod: 26,,, | Performed by: RADIOLOGY

## 2019-11-05 ENCOUNTER — HOSPITAL ENCOUNTER (OUTPATIENT)
Dept: RADIOLOGY | Facility: HOSPITAL | Age: 34
Discharge: HOME | End: 2019-11-05
Attending: CHIROPRACTOR
Payer: COMMERCIAL

## 2019-11-05 ENCOUNTER — TELEPHONE (OUTPATIENT)
Dept: SURGERY | Facility: CLINIC | Age: 34
End: 2019-11-05

## 2019-11-05 DIAGNOSIS — M54.50 LOW BACK PAIN: ICD-10-CM

## 2019-11-05 PROCEDURE — 72148 MRI LUMBAR SPINE W/O DYE: CPT | Mod: 26,,, | Performed by: RADIOLOGY

## 2019-11-05 PROCEDURE — 72148 MRI LUMBAR SPINE W/O DYE: CPT | Mod: TC

## 2019-11-05 PROCEDURE — 72148 MRI LUMBAR SPINE WITHOUT CONTRAST: ICD-10-PCS | Mod: 26,,, | Performed by: RADIOLOGY

## 2019-11-05 NOTE — TELEPHONE ENCOUNTER
Patient called to make an appointment in Dr. Ferrell's gen/surg clinic. Patient scheduled for 11/14 at 3:15 PM at [facility] [place] 2nd [place], Gen/Surg Clinic. Patient also mailed a release of information form to sign through her email. I will request records from [facility] from Dr. Howard. Patient verbalized understanding.

## 2019-11-06 ENCOUNTER — PATIENT MESSAGE (OUTPATIENT)
Dept: GASTROENTEROLOGY | Facility: CLINIC | Age: 34
End: 2019-11-06

## 2019-11-13 ENCOUNTER — PATIENT MESSAGE (OUTPATIENT)
Dept: GASTROENTEROLOGY | Facility: CLINIC | Age: 34
End: 2019-11-13

## 2019-11-14 ENCOUNTER — DOCUMENTATION ONLY (OUTPATIENT)
Dept: TRANSPLANT | Facility: CLINIC | Age: 34
End: 2019-11-14

## 2019-11-14 ENCOUNTER — INITIAL CONSULT (OUTPATIENT)
Dept: SURGERY | Facility: CLINIC | Age: 34
End: 2019-11-14
Payer: COMMERCIAL

## 2019-11-14 ENCOUNTER — TELEPHONE (OUTPATIENT)
Dept: GASTROENTEROLOGY | Facility: HOSPITAL | Age: 34
End: 2019-11-14

## 2019-11-14 VITALS
HEIGHT: 65 IN | WEIGHT: 231.69 LBS | BODY MASS INDEX: 38.6 KG/M2 | DIASTOLIC BLOOD PRESSURE: 79 MMHG | SYSTOLIC BLOOD PRESSURE: 165 MMHG | HEART RATE: 68 BPM

## 2019-11-14 DIAGNOSIS — C43.71: Primary | ICD-10-CM

## 2019-11-14 DIAGNOSIS — K76.0 FATTY LIVER: ICD-10-CM

## 2019-11-14 DIAGNOSIS — R10.13 DYSPEPSIA: Primary | ICD-10-CM

## 2019-11-14 PROCEDURE — 99999 PR PBB SHADOW E&M-EST. PATIENT-LVL III: ICD-10-PCS | Mod: PBBFAC,,, | Performed by: SURGERY

## 2019-11-14 PROCEDURE — 99999 PR PBB SHADOW E&M-EST. PATIENT-LVL III: CPT | Mod: PBBFAC,,, | Performed by: SURGERY

## 2019-11-14 PROCEDURE — 99244 OFF/OP CNSLTJ NEW/EST MOD 40: CPT | Mod: S$GLB,,, | Performed by: SURGERY

## 2019-11-14 PROCEDURE — 99244 PR OFFICE CONSULTATION,LEVEL IV: ICD-10-PCS | Mod: S$GLB,,, | Performed by: SURGERY

## 2019-11-14 RX ORDER — PANTOPRAZOLE SODIUM 40 MG/1
40 TABLET, DELAYED RELEASE ORAL DAILY
Qty: 90 TABLET | Refills: 1 | Status: SHIPPED | OUTPATIENT
Start: 2019-11-14 | End: 2020-07-28 | Stop reason: SDUPTHER

## 2019-11-14 NOTE — LETTER
[facility]/Surg Onc  [address]  [place] [address]  Phone: [phone] November 15, 2019      Amita Howard MD  [address] [address]    Patient: Katarina Munguia   MR Number: [id number]   YOB: 1985   Date of Visit: 11/14/2019       Dear Dr. Howard:    Thank you for referring Katarina Munguia to me for evaluation. Attached you will find relevant portions of my assessment and plan of care.    If you have questions, please do not hesitate to call me. I look forward to following Katarina Munguia along with you.    Sincerely,      Cj Ferrell MD, FACS  Medical Director, [facility]  Senior Attending Surgeon, Surgical Oncology, Department of Surgery  Associate Professor of Surgery   [employer]  Clerkship Director of Surgery, [facility] [employer]     RC/etb    Enclosure

## 2019-11-14 NOTE — NURSING
Pt records reviewed.   Pt will be referred to Hepatology.  Fatty liver  Initial referral received  from the [facility].   Referring Provider/diagnosis  Katty Garcia MD      Referral letter sent to patient.

## 2019-11-14 NOTE — LETTER
November 15, 2019      Amita Howard MD  [address] [address]           [facility] S[facility]/Surg Onc  [address]  [place] [address]  Phone: [phone]          Patient: Katarina Munguia   MR Number: [id number]   YOB: 1985   Date of Visit: 11/14/2019       Dear Dr. Amita Howard:    Thank you for referring Katarina Munguia to me for evaluation. Attached you will find relevant portions of my assessment and plan of care.    If you have questions, please do not hesitate to call me. I look forward to following Katarina Munguia along with you.    Sincerely,    Cj Ferrell MD    Enclosure  CC:  No Recipients    If you would like to receive this communication electronically, please contact [email or web address] or [phone] to request more information on [employer] Link access.    For providers and/or their staff who would like to refer a patient to [facility], please contact us through our one-stop-shop provider referral line, [facility], at [phone].    If you feel you have received this communication in error or would no longer like to receive these types of communications, please e-mail [email or web address]

## 2019-11-14 NOTE — PROGRESS NOTES
General Surgery   History & Physical    SUBJECTIVE:     Chief Complaint   Patient presents with    Pre-op Exam     History of Present Illness:  Katarina Munguia is a 34 y.o. female presents for evaluation of newly diagnosed melanoma of the right ankle. She has a history of melanoma on her upper back s/p WLE in 2005. She underwent shave biopsy of a small (3mm) lesion on the anterior aspect of her right ankle on 10/9/19. Pathology consistent with malignant melanoma, superficial spreading type. At least 0.65mm in depth, level IV, no ulcerations, 1 mitosis/mm, no angio or neural invasion.   Decision Dx Melanoma Class 1A.    FH:   Father - lung cancer    Review of patient's allergies indicates:  No Known Allergies    Current Outpatient Medications   Medication Sig Dispense Refill    amLODIPine (NORVASC) 5 MG tablet Take 1 tablet (5 mg total) by mouth once daily. 30 tablet 3    chlorthalidone (HYGROTEN) 25 MG Tab Take 1 tablet (25 mg total) by mouth once daily. 30 tablet 3    flu vacc [id number] 6mos up,PF, 60 mcg (15 mcg x 4)/0.5 mL Syrg inject as directed 0.5 mL 0    fluticasone (FLONASE) 50 mcg/actuation nasal spray 2 sprays (100 mcg total) by Each Nare route once daily. 16 g 0    MICROGESTIN FE 1.5/30, 28, 1.5 mg-30 mcg (21)/75 mg (7) tablet       multivitamin capsule Take 1 capsule by mouth once daily.      pantoprazole (PROTONIX) 40 MG tablet Take 1 tablet (40 mg total) by mouth once daily. 90 tablet 1     No current facility-administered medications for this visit.        Past Medical History:   Diagnosis Date    Allergy     Melanoma      Past Surgical History:   Procedure Laterality Date    FOOT SURGERY      TONSILLECTOMY       Family History   Problem Relation Age of Onset    Hypertension Mother     Hyperlipidemia Mother     Transient ischemic attack Mother     Cancer Father     Lung cancer Father     Heart disease Maternal Grandfather     Heart disease Sister      Social History     Tobacco Use  "   Smoking status: Never Smoker    Smokeless tobacco: Never Used   Substance Use Topics    Alcohol use: Yes     Alcohol/week: 1.0 standard drinks     Types: 1 Cans of beer per week     Frequency: 2-4 times a month     Drinks per session: 1 or 2     Binge frequency: Never     Comment: occasional    Drug use: No        Review of Systems:  Review of Systems   Constitutional: Negative.    HENT: Negative.    Eyes: Negative.    Respiratory: Negative.    Cardiovascular: Negative.    Gastrointestinal: Negative.    Endocrine: Negative.    Genitourinary: Negative.    Musculoskeletal: Negative.    Skin: Negative.    Allergic/Immunologic: Negative.    Neurological: Negative.    Hematological: Negative.    Psychiatric/Behavioral: Negative.        OBJECTIVE:   Vital Signs (Most Recent)  Pulse: 68 (11/14/19 1511)  BP: (!) 165/79 (11/14/19 1511)  5' 5" (1.651 m)  105.1 kg (231 lb 11.3 oz)   Physical Exam:  Physical Exam   Constitutional: She is oriented to person, place, and time. She appears well-developed and well-nourished.   HENT:   Head: Normocephalic and atraumatic.   Eyes: EOM are normal.   Neck: Neck supple.   Cardiovascular: Normal rate and regular rhythm.   Pulmonary/Chest: Effort normal.   Abdominal: Soft. She exhibits no distension. There is no tenderness.   Musculoskeletal: Normal range of motion.   Neurological: She is alert and oriented to person, place, and time.   Skin: Skin is warm and dry.        Psychiatric: She has a normal mood and affect. Her behavior is normal.   Nursing note and vitals reviewed.    Laboratory  Available labs reviewed.    Diagnostic Results:  Available imaging and diagnostic studies reviewed.     PATHOLOGY    Please see outside Pathology Report under Media tab  ASSESSMENT/PLAN:     34 y.o. female with Stage 1a melanoma of right ankle. 0.65mm in depth with positive deep margin    PLAN:  - Will proceed with WLE of right ankle melanoma with 1cm margins and sentinel lymph biopsy. Will plan " to leave the wound open to heal by secondary intention  - Scheduled and consented    Bong Hathaway MD  General Surgery PGYIII I  [phone]    I have personally taken the history and examined this patient and agree with the resident's note as stated above.  The patient is a thin T1a melanoma of the right proximal anterior medial ankle near the junction of the ankle and instep anteriorly.  It is of Breslow depth 0.66 mm, Felix level 4, with 1 mitoses per mm squared.  The deep margin is involved on the initial shave biopsy.    The biopsy areas approximately 5 mm in diameter.  At this point I recommend at least a 1 cm wide local excision given the T1 a lesion.  She ultimately may require a further wider local excision margin if the ultimate true depth of this lesion is more than a mm.  Clinically this will likely not be the scenario but it was discussed with her.  I have recommended a regional sentinel lymph node biopsy likely of the right groin, possibly of the popliteal area although unlikely, given the features of Felix level for with the my ptosis noted and uncertainty about the true depth of the lesion.  Given the old staging criteria this with a been a T1b with the my ptosis noted but in the new were addition upstaging this is a T1a lesion.  Record her approximately 10% likelihood of finding a positive sentinel lymph node with some of the older data.  With the decision D ex classifying this as a class 1A lesion the likelihood of a positive sentinel node would be approximately 5%.    We discussed possible wide excision and along the wound to heal secondarily since it will be tight with no excess skin for redundancy for closure.  It would be difficult to close this primarily and also difficult to grafted as it will likely be directly over tendon.    Therefore we will proceed with wide local excision only with a 1 cm margin with right regional groin sentinel lymph node biopsy without graft her primary closure.  This will  allow us to assess the true full depth of the lesion and margins, as well as allowed to begin to granulate and heal secondarily.  We can always perform a delayed skin graft to the area once the wound begins to granulate if she is uncomfortable with just allowing it to heal secondarily.     The patient has been consented and scheduled for surgery on Monday 11-25-19.

## 2019-11-14 NOTE — H&P (VIEW-ONLY)
General Surgery   History & Physical    SUBJECTIVE:     Chief Complaint   Patient presents with    Pre-op Exam     History of Present Illness:  Katarina Munguia is a 34 y.o. female presents for evaluation of newly diagnosed melanoma of the right ankle. She has a history of melanoma on her upper back s/p WLE in 2005. She underwent shave biopsy of a small (3mm) lesion on the anterior aspect of her right ankle on 10/9/19. Pathology consistent with malignant melanoma, superficial spreading type. At least 0.65mm in depth, level IV, no ulcerations, 1 mitosis/mm, no angio or neural invasion.   Decision Dx Melanoma Class 1A.    FH:   Father - lung cancer    Review of patient's allergies indicates:  No Known Allergies    Current Outpatient Medications   Medication Sig Dispense Refill    amLODIPine (NORVASC) 5 MG tablet Take 1 tablet (5 mg total) by mouth once daily. 30 tablet 3    chlorthalidone (HYGROTEN) 25 MG Tab Take 1 tablet (25 mg total) by mouth once daily. 30 tablet 3    flu vacc [id number] 6mos up,PF, 60 mcg (15 mcg x 4)/0.5 mL Syrg inject as directed 0.5 mL 0    fluticasone (FLONASE) 50 mcg/actuation nasal spray 2 sprays (100 mcg total) by Each Nare route once daily. 16 g 0    MICROGESTIN FE 1.5/30, 28, 1.5 mg-30 mcg (21)/75 mg (7) tablet       multivitamin capsule Take 1 capsule by mouth once daily.      pantoprazole (PROTONIX) 40 MG tablet Take 1 tablet (40 mg total) by mouth once daily. 90 tablet 1     No current facility-administered medications for this visit.        Past Medical History:   Diagnosis Date    Allergy     Melanoma      Past Surgical History:   Procedure Laterality Date    FOOT SURGERY      TONSILLECTOMY       Family History   Problem Relation Age of Onset    Hypertension Mother     Hyperlipidemia Mother     Transient ischemic attack Mother     Cancer Father     Lung cancer Father     Heart disease Maternal Grandfather     Heart disease Sister      Social History     Tobacco Use  "   Smoking status: Never Smoker    Smokeless tobacco: Never Used   Substance Use Topics    Alcohol use: Yes     Alcohol/week: 1.0 standard drinks     Types: 1 Cans of beer per week     Frequency: 2-4 times a month     Drinks per session: 1 or 2     Binge frequency: Never     Comment: occasional    Drug use: No        Review of Systems:  Review of Systems   Constitutional: Negative.    HENT: Negative.    Eyes: Negative.    Respiratory: Negative.    Cardiovascular: Negative.    Gastrointestinal: Negative.    Endocrine: Negative.    Genitourinary: Negative.    Musculoskeletal: Negative.    Skin: Negative.    Allergic/Immunologic: Negative.    Neurological: Negative.    Hematological: Negative.    Psychiatric/Behavioral: Negative.        OBJECTIVE:   Vital Signs (Most Recent)  Pulse: 68 (11/14/19 1511)  BP: (!) 165/79 (11/14/19 1511)  5' 5" (1.651 m)  105.1 kg (231 lb 11.3 oz)   Physical Exam:  Physical Exam   Constitutional: She is oriented to person, place, and time. She appears well-developed and well-nourished.   HENT:   Head: Normocephalic and atraumatic.   Eyes: EOM are normal.   Neck: Neck supple.   Cardiovascular: Normal rate and regular rhythm.   Pulmonary/Chest: Effort normal.   Abdominal: Soft. She exhibits no distension. There is no tenderness.   Musculoskeletal: Normal range of motion.   Neurological: She is alert and oriented to person, place, and time.   Skin: Skin is warm and dry.        Psychiatric: She has a normal mood and affect. Her behavior is normal.   Nursing note and vitals reviewed.    Laboratory  Available labs reviewed.    Diagnostic Results:  Available imaging and diagnostic studies reviewed.     PATHOLOGY    Please see outside Pathology Report under Media tab  ASSESSMENT/PLAN:     34 y.o. female with Stage 1a melanoma of right ankle. 0.65mm in depth with positive deep margin    PLAN:  - Will proceed with WLE of right ankle melanoma with 1cm margins and sentinel lymph biopsy. Will plan " to leave the wound open to heal by secondary intention  - Scheduled and consented    Bong Hathaway MD  General Surgery PGYIII I  [phone]    I have personally taken the history and examined this patient and agree with the resident's note as stated above.  The patient is a thin T1a melanoma of the right proximal anterior medial ankle near the junction of the ankle and instep anteriorly.  It is of Breslow depth 0.66 mm, Felix level 4, with 1 mitoses per mm squared.  The deep margin is involved on the initial shave biopsy.    The biopsy areas approximately 5 mm in diameter.  At this point I recommend at least a 1 cm wide local excision given the T1 a lesion.  She ultimately may require a further wider local excision margin if the ultimate true depth of this lesion is more than a mm.  Clinically this will likely not be the scenario but it was discussed with her.  I have recommended a regional sentinel lymph node biopsy likely of the right groin, possibly of the popliteal area although unlikely, given the features of Felix level for with the my ptosis noted and uncertainty about the true depth of the lesion.  Given the old staging criteria this with a been a T1b with the my ptosis noted but in the new were addition upstaging this is a T1a lesion.  Record her approximately 10% likelihood of finding a positive sentinel lymph node with some of the older data.  With the decision D ex classifying this as a class 1A lesion the likelihood of a positive sentinel node would be approximately 5%.    We discussed possible wide excision and along the wound to heal secondarily since it will be tight with no excess skin for redundancy for closure.  It would be difficult to close this primarily and also difficult to grafted as it will likely be directly over tendon.    Therefore we will proceed with wide local excision only with a 1 cm margin with right regional groin sentinel lymph node biopsy without graft her primary closure.  This will  allow us to assess the true full depth of the lesion and margins, as well as allowed to begin to granulate and heal secondarily.  We can always perform a delayed skin graft to the area once the wound begins to granulate if she is uncomfortable with just allowing it to heal secondarily.     The patient has been consented and scheduled for surgery on Monday 11-25-19.

## 2019-11-14 NOTE — LETTER
November 14, 2019    Katarina Munguia  [address] [place] [id number]      Dear Katarina Munguia:    Your doctor has referred you to the [facility]. We are sending this letter to remind you to make an appointment with us to complete the referral process.     Please call us at [phone] to schedule an appointment. We look forward to seeing you soon.     If you received a call and have been scheduled, please disregard this letter.       Sincerely,        [facility]   [address] [place] [address]  [phone]

## 2019-11-19 ENCOUNTER — LAB VISIT (OUTPATIENT)
Dept: LAB | Facility: HOSPITAL | Age: 34
End: 2019-11-19
Attending: INTERNAL MEDICINE
Payer: COMMERCIAL

## 2019-11-19 ENCOUNTER — PATIENT MESSAGE (OUTPATIENT)
Dept: INTERNAL MEDICINE | Facility: CLINIC | Age: 34
End: 2019-11-19

## 2019-11-19 DIAGNOSIS — R79.89 ELEVATED LFTS: Primary | ICD-10-CM

## 2019-11-19 DIAGNOSIS — Z00.00 ANNUAL PHYSICAL EXAM: ICD-10-CM

## 2019-11-19 DIAGNOSIS — K76.0 FATTY LIVER: ICD-10-CM

## 2019-11-19 LAB
ALBUMIN SERPL BCP-MCNC: 4.2 G/DL (ref 3.5–5.2)
ALP SERPL-CCNC: 44 U/L (ref 55–135)
ALT SERPL W/O P-5'-P-CCNC: 199 U/L (ref 10–44)
ANION GAP SERPL CALC-SCNC: 8 MMOL/L (ref 8–16)
AST SERPL-CCNC: 52 U/L (ref 10–40)
BASOPHILS # BLD AUTO: 0.06 K/UL (ref 0–0.2)
BASOPHILS NFR BLD: 0.6 % (ref 0–1.9)
BILIRUB SERPL-MCNC: 0.7 MG/DL (ref 0.1–1)
BUN SERPL-MCNC: 14 MG/DL (ref 6–20)
CALCIUM SERPL-MCNC: 9.4 MG/DL (ref 8.7–10.5)
CHLORIDE SERPL-SCNC: 105 MMOL/L (ref 95–110)
CHOLEST SERPL-MCNC: 261 MG/DL (ref 120–199)
CHOLEST/HDLC SERPL: 5.2 {RATIO} (ref 2–5)
CO2 SERPL-SCNC: 26 MMOL/L (ref 23–29)
CREAT SERPL-MCNC: 0.7 MG/DL (ref 0.5–1.4)
DIFFERENTIAL METHOD: NORMAL
EOSINOPHIL # BLD AUTO: 0.1 K/UL (ref 0–0.5)
EOSINOPHIL NFR BLD: 1.1 % (ref 0–8)
ERYTHROCYTE [DISTWIDTH] IN BLOOD BY AUTOMATED COUNT: 12 % (ref 11.5–14.5)
EST. GFR  (AFRICAN AMERICAN): >60 ML/MIN/1.73 M^2
EST. GFR  (NON AFRICAN AMERICAN): >60 ML/MIN/1.73 M^2
ESTIMATED AVG GLUCOSE: 97 MG/DL (ref 68–131)
GLUCOSE SERPL-MCNC: 90 MG/DL (ref 70–110)
HBA1C MFR BLD HPLC: 5 % (ref 4–5.6)
HCT VFR BLD AUTO: 41.3 % (ref 37–48.5)
HDLC SERPL-MCNC: 50 MG/DL (ref 40–75)
HDLC SERPL: 19.2 % (ref 20–50)
HGB BLD-MCNC: 13.6 G/DL (ref 12–16)
IMM GRANULOCYTES # BLD AUTO: 0.03 K/UL (ref 0–0.04)
IMM GRANULOCYTES NFR BLD AUTO: 0.3 % (ref 0–0.5)
LDLC SERPL CALC-MCNC: 177 MG/DL (ref 63–159)
LYMPHOCYTES # BLD AUTO: 4 K/UL (ref 1–4.8)
LYMPHOCYTES NFR BLD: 37.3 % (ref 18–48)
MCH RBC QN AUTO: 29.4 PG (ref 27–31)
MCHC RBC AUTO-ENTMCNC: 32.9 G/DL (ref 32–36)
MCV RBC AUTO: 89 FL (ref 82–98)
MONOCYTES # BLD AUTO: 0.5 K/UL (ref 0.3–1)
MONOCYTES NFR BLD: 4.6 % (ref 4–15)
NEUTROPHILS # BLD AUTO: 6.1 K/UL (ref 1.8–7.7)
NEUTROPHILS NFR BLD: 56.1 % (ref 38–73)
NONHDLC SERPL-MCNC: 211 MG/DL
NRBC BLD-RTO: 0 /100 WBC
PLATELET # BLD AUTO: 328 K/UL (ref 150–350)
PMV BLD AUTO: 11 FL (ref 9.2–12.9)
POTASSIUM SERPL-SCNC: 4 MMOL/L (ref 3.5–5.1)
PROT SERPL-MCNC: 7.5 G/DL (ref 6–8.4)
RBC # BLD AUTO: 4.63 M/UL (ref 4–5.4)
SODIUM SERPL-SCNC: 139 MMOL/L (ref 136–145)
TRIGL SERPL-MCNC: 170 MG/DL (ref 30–150)
TSH SERPL DL<=0.005 MIU/L-ACNC: 1.43 UIU/ML (ref 0.4–4)
WBC # BLD AUTO: 10.8 K/UL (ref 3.9–12.7)

## 2019-11-19 PROCEDURE — 80053 COMPREHEN METABOLIC PANEL: CPT

## 2019-11-19 PROCEDURE — 83036 HEMOGLOBIN GLYCOSYLATED A1C: CPT

## 2019-11-19 PROCEDURE — 84443 ASSAY THYROID STIM HORMONE: CPT

## 2019-11-19 PROCEDURE — 85025 COMPLETE CBC W/AUTO DIFF WBC: CPT

## 2019-11-19 PROCEDURE — 36415 COLL VENOUS BLD VENIPUNCTURE: CPT

## 2019-11-19 PROCEDURE — 80061 LIPID PANEL: CPT

## 2019-11-20 DIAGNOSIS — C43.71 MALIGNANT MELANOMA OF RIGHT LOWER EXTREMITY: Primary | ICD-10-CM

## 2019-11-22 ENCOUNTER — TELEPHONE (OUTPATIENT)
Dept: SURGERY | Facility: CLINIC | Age: 34
End: 2019-11-22

## 2019-11-22 NOTE — PRE-PROCEDURE INSTRUCTIONS
PreOp Instructions given:   - Verbal medication information (what to hold and what to take)   - NPO guidelines   - Arrival place directions given;   - Bathing with antibacterial soap   - Don't wear any jewelry or bring any valuables AM of surgery   - No makeup or moisturizer to face   - No perfume/cologne, powder, lotions or aftershave   Pt. verbalized understanding.     Pt reports w/foot sx she had unpleasant unintended awareness - pt remembers hearing staff talking but does not remember feeling pain.

## 2019-11-22 NOTE — TELEPHONE ENCOUNTER
spoke with patient regarding surgery arrival time, [facility]10 [facility]. pt verbalized understanding.

## 2019-11-25 ENCOUNTER — HOSPITAL ENCOUNTER (OUTPATIENT)
Dept: RADIOLOGY | Facility: HOSPITAL | Age: 34
Discharge: HOME | End: 2019-11-25
Attending: SURGERY
Payer: COMMERCIAL

## 2019-11-25 ENCOUNTER — ANESTHESIA (OUTPATIENT)
Dept: SURGERY | Facility: HOSPITAL | Age: 34
End: 2019-11-25
Payer: COMMERCIAL

## 2019-11-25 ENCOUNTER — ANESTHESIA EVENT (OUTPATIENT)
Dept: SURGERY | Facility: HOSPITAL | Age: 34
End: 2019-11-25
Payer: COMMERCIAL

## 2019-11-25 ENCOUNTER — HOSPITAL ENCOUNTER (OUTPATIENT)
Facility: HOSPITAL | Age: 34
Discharge: HOME | End: 2019-11-25
Attending: SURGERY | Admitting: SURGERY
Payer: COMMERCIAL

## 2019-11-25 VITALS
HEIGHT: 65 IN | BODY MASS INDEX: 38.32 KG/M2 | HEART RATE: 75 BPM | OXYGEN SATURATION: 94 % | WEIGHT: 230 LBS | SYSTOLIC BLOOD PRESSURE: 116 MMHG | DIASTOLIC BLOOD PRESSURE: 69 MMHG | TEMPERATURE: 98 F | RESPIRATION RATE: 18 BRPM

## 2019-11-25 DIAGNOSIS — C43.71 MALIGNANT MELANOMA OF RIGHT LOWER EXTREMITY: ICD-10-CM

## 2019-11-25 DIAGNOSIS — C43.9 MELANOMA: Primary | ICD-10-CM

## 2019-11-25 LAB
B-HCG UR QL: NEGATIVE
CTP QC/QA: YES

## 2019-11-25 PROCEDURE — 71000015 HC POSTOP RECOV 1ST HR: Performed by: SURGERY

## 2019-11-25 PROCEDURE — 38531 PR BIOPSY/EXCISION, INGUINOFEMORAL NODE(S), OPEN: ICD-10-PCS | Mod: RT,,, | Performed by: SURGERY

## 2019-11-25 PROCEDURE — 88305 TISSUE EXAM BY PATHOLOGIST: CPT | Mod: 26,,, | Performed by: PATHOLOGY

## 2019-11-25 PROCEDURE — 63600175 PHARM REV CODE 636 W HCPCS: Mod: JG | Performed by: SURGERY

## 2019-11-25 PROCEDURE — 38531 OPEN BX/EXC INGUINOFEM NODES: CPT | Mod: RT,,, | Performed by: SURGERY

## 2019-11-25 PROCEDURE — 37000008 HC ANESTHESIA 1ST 15 MINUTES: Performed by: SURGERY

## 2019-11-25 PROCEDURE — 88341 PR IHC OR ICC EACH ADD'L SINGLE ANTIBODY  STAINPR: ICD-10-PCS | Mod: 26,,, | Performed by: PATHOLOGY

## 2019-11-25 PROCEDURE — 36000706: Performed by: SURGERY

## 2019-11-25 PROCEDURE — D9220A PRA ANESTHESIA: ICD-10-PCS | Mod: CRNA,,, | Performed by: NURSE ANESTHETIST, CERTIFIED REGISTERED

## 2019-11-25 PROCEDURE — 88305 TISSUE EXAM BY PATHOLOGIST: CPT | Performed by: PATHOLOGY

## 2019-11-25 PROCEDURE — 25000003 PHARM REV CODE 250: Performed by: SURGERY

## 2019-11-25 PROCEDURE — 11603 PR EXC SKIN MALIG 2.1-3 CM TRUNK,ARM,LEG: ICD-10-PCS | Mod: 51,RT,, | Performed by: SURGERY

## 2019-11-25 PROCEDURE — 38792 PR IDENTIFY SENTINEL 2DE: ICD-10-PCS | Mod: 59,RT,, | Performed by: SURGERY

## 2019-11-25 PROCEDURE — 88342 IMHCHEM/IMCYTCHM 1ST ANTB: CPT | Performed by: PATHOLOGY

## 2019-11-25 PROCEDURE — 38900 IO MAP OF SENT LYMPH NODE: CPT | Mod: RT,,, | Performed by: SURGERY

## 2019-11-25 PROCEDURE — 88342 IMHCHEM/IMCYTCHM 1ST ANTB: CPT | Mod: 26,,, | Performed by: PATHOLOGY

## 2019-11-25 PROCEDURE — 63600175 PHARM REV CODE 636 W HCPCS: Performed by: STUDENT IN AN ORGANIZED HEALTH CARE EDUCATION/TRAINING PROGRAM

## 2019-11-25 PROCEDURE — 25000003 PHARM REV CODE 250: Performed by: STUDENT IN AN ORGANIZED HEALTH CARE EDUCATION/TRAINING PROGRAM

## 2019-11-25 PROCEDURE — 37000009 HC ANESTHESIA EA ADD 15 MINS: Performed by: SURGERY

## 2019-11-25 PROCEDURE — 11603 EXC TR-EXT MAL+MARG 2.1-3 CM: CPT | Mod: 51,RT,, | Performed by: SURGERY

## 2019-11-25 PROCEDURE — S0020 INJECTION, BUPIVICAINE HYDRO: HCPCS | Performed by: SURGERY

## 2019-11-25 PROCEDURE — 63600175 PHARM REV CODE 636 W HCPCS: Performed by: ANESTHESIOLOGY

## 2019-11-25 PROCEDURE — 88341 IMHCHEM/IMCYTCHM EA ADD ANTB: CPT | Mod: 26,,, | Performed by: PATHOLOGY

## 2019-11-25 PROCEDURE — 88341 IMHCHEM/IMCYTCHM EA ADD ANTB: CPT | Mod: 59 | Performed by: PATHOLOGY

## 2019-11-25 PROCEDURE — 94761 N-INVAS EAR/PLS OXIMETRY MLT: CPT

## 2019-11-25 PROCEDURE — A9520 TC99 TILMANOCEPT DIAG 0.5MCI: HCPCS

## 2019-11-25 PROCEDURE — 38792 RA TRACER ID OF SENTINL NODE: CPT | Mod: 59,RT,, | Performed by: SURGERY

## 2019-11-25 PROCEDURE — 25000003 PHARM REV CODE 250: Performed by: NURSE ANESTHETIST, CERTIFIED REGISTERED

## 2019-11-25 PROCEDURE — D9220A PRA ANESTHESIA: Mod: CRNA,,, | Performed by: NURSE ANESTHETIST, CERTIFIED REGISTERED

## 2019-11-25 PROCEDURE — D9220A PRA ANESTHESIA: Mod: ANES,,, | Performed by: ANESTHESIOLOGY

## 2019-11-25 PROCEDURE — 71000033 HC RECOVERY, INTIAL HOUR: Performed by: SURGERY

## 2019-11-25 PROCEDURE — 63600175 PHARM REV CODE 636 W HCPCS: Performed by: NURSE ANESTHETIST, CERTIFIED REGISTERED

## 2019-11-25 PROCEDURE — D9220A PRA ANESTHESIA: ICD-10-PCS | Mod: ANES,,, | Performed by: ANESTHESIOLOGY

## 2019-11-25 PROCEDURE — 36000707: Performed by: SURGERY

## 2019-11-25 PROCEDURE — 38900 PR INTRAOPERATIVE SENTINEL LYMPH NODE ID W DYE INJECTION: ICD-10-PCS | Mod: RT,,, | Performed by: SURGERY

## 2019-11-25 PROCEDURE — 88342 CHG IMMUNOCYTOCHEMISTRY: ICD-10-PCS | Mod: 26,,, | Performed by: PATHOLOGY

## 2019-11-25 PROCEDURE — 27201423 OPTIME MED/SURG SUP & DEVICES STERILE SUPPLY: Performed by: SURGERY

## 2019-11-25 PROCEDURE — 88305 TISSUE EXAM BY PATHOLOGIST: ICD-10-PCS | Mod: 26,,, | Performed by: PATHOLOGY

## 2019-11-25 PROCEDURE — 81025 URINE PREGNANCY TEST: CPT | Performed by: SURGERY

## 2019-11-25 RX ORDER — ONDANSETRON 2 MG/ML
4 INJECTION INTRAMUSCULAR; INTRAVENOUS EVERY 12 HOURS PRN
Status: DISCONTINUED | OUTPATIENT
Start: 2019-11-25 | End: 2019-11-25 | Stop reason: HOSPADM

## 2019-11-25 RX ORDER — DEXAMETHASONE SODIUM PHOSPHATE 4 MG/ML
INJECTION, SOLUTION INTRA-ARTICULAR; INTRALESIONAL; INTRAMUSCULAR; INTRAVENOUS; SOFT TISSUE
Status: DISCONTINUED | OUTPATIENT
Start: 2019-11-25 | End: 2019-11-25

## 2019-11-25 RX ORDER — SODIUM CHLORIDE 0.9 % (FLUSH) 0.9 %
10 SYRINGE (ML) INJECTION
Status: DISCONTINUED | OUTPATIENT
Start: 2019-11-25 | End: 2019-11-25 | Stop reason: HOSPADM

## 2019-11-25 RX ORDER — HYDROCODONE BITARTRATE AND ACETAMINOPHEN 5; 325 MG/1; MG/1
1 TABLET ORAL EVERY 6 HOURS PRN
Status: DISCONTINUED | OUTPATIENT
Start: 2019-11-25 | End: 2019-11-25 | Stop reason: HOSPADM

## 2019-11-25 RX ORDER — SODIUM CHLORIDE 9 MG/ML
INJECTION, SOLUTION INTRAVENOUS CONTINUOUS
Status: DISCONTINUED | OUTPATIENT
Start: 2019-11-25 | End: 2019-11-25 | Stop reason: HOSPADM

## 2019-11-25 RX ORDER — HYDROMORPHONE HYDROCHLORIDE 1 MG/ML
0.2 INJECTION, SOLUTION INTRAMUSCULAR; INTRAVENOUS; SUBCUTANEOUS EVERY 5 MIN PRN
Status: COMPLETED | OUTPATIENT
Start: 2019-11-25 | End: 2019-11-25

## 2019-11-25 RX ORDER — BUPIVACAINE HYDROCHLORIDE 5 MG/ML
INJECTION, SOLUTION EPIDURAL; INTRACAUDAL
Status: DISCONTINUED | OUTPATIENT
Start: 2019-11-25 | End: 2019-11-25 | Stop reason: HOSPADM

## 2019-11-25 RX ORDER — PROPOFOL 10 MG/ML
VIAL (ML) INTRAVENOUS
Status: DISCONTINUED | OUTPATIENT
Start: 2019-11-25 | End: 2019-11-25

## 2019-11-25 RX ORDER — ISOSULFAN BLUE 50 MG/5ML
INJECTION, SOLUTION SUBCUTANEOUS
Status: DISCONTINUED | OUTPATIENT
Start: 2019-11-25 | End: 2019-11-25 | Stop reason: HOSPADM

## 2019-11-25 RX ORDER — LIDOCAINE HCL/PF 100 MG/5ML
SYRINGE (ML) INTRAVENOUS
Status: DISCONTINUED | OUTPATIENT
Start: 2019-11-25 | End: 2019-11-25

## 2019-11-25 RX ORDER — ONDANSETRON 2 MG/ML
INJECTION INTRAMUSCULAR; INTRAVENOUS
Status: DISCONTINUED | OUTPATIENT
Start: 2019-11-25 | End: 2019-11-25

## 2019-11-25 RX ORDER — LIDOCAINE HYDROCHLORIDE 10 MG/ML
1 INJECTION, SOLUTION EPIDURAL; INFILTRATION; INTRACAUDAL; PERINEURAL ONCE
Status: DISCONTINUED | OUTPATIENT
Start: 2019-11-25 | End: 2019-11-25 | Stop reason: HOSPADM

## 2019-11-25 RX ORDER — HYDROCODONE BITARTRATE AND ACETAMINOPHEN 5; 325 MG/1; MG/1
1 TABLET ORAL EVERY 6 HOURS PRN
Qty: 25 TABLET | Refills: 0 | Status: SHIPPED | OUTPATIENT
Start: 2019-11-25 | End: 2020-01-09 | Stop reason: SDUPTHER

## 2019-11-25 RX ORDER — MIDAZOLAM HYDROCHLORIDE 1 MG/ML
INJECTION, SOLUTION INTRAMUSCULAR; INTRAVENOUS
Status: DISCONTINUED | OUTPATIENT
Start: 2019-11-25 | End: 2019-11-25

## 2019-11-25 RX ORDER — CEFAZOLIN SODIUM 1 G/3ML
2 INJECTION, POWDER, FOR SOLUTION INTRAMUSCULAR; INTRAVENOUS
Status: COMPLETED | OUTPATIENT
Start: 2019-11-25 | End: 2019-11-25

## 2019-11-25 RX ORDER — FENTANYL CITRATE 50 UG/ML
INJECTION, SOLUTION INTRAMUSCULAR; INTRAVENOUS
Status: DISCONTINUED | OUTPATIENT
Start: 2019-11-25 | End: 2019-11-25

## 2019-11-25 RX ADMIN — SODIUM CHLORIDE, SODIUM GLUCONATE, SODIUM ACETATE, POTASSIUM CHLORIDE, MAGNESIUM CHLORIDE, SODIUM PHOSPHATE, DIBASIC, AND POTASSIUM PHOSPHATE: .53; .5; .37; .037; .03; .012; .00082 INJECTION, SOLUTION INTRAVENOUS at 03:11

## 2019-11-25 RX ADMIN — HYDROMORPHONE HYDROCHLORIDE 0.2 MG: 1 INJECTION, SOLUTION INTRAMUSCULAR; INTRAVENOUS; SUBCUTANEOUS at 05:11

## 2019-11-25 RX ADMIN — FENTANYL CITRATE 25 MCG: 50 INJECTION, SOLUTION INTRAMUSCULAR; INTRAVENOUS at 02:11

## 2019-11-25 RX ADMIN — PROPOFOL 40 MG: 10 INJECTION, EMULSION INTRAVENOUS at 03:11

## 2019-11-25 RX ADMIN — HYDROMORPHONE HYDROCHLORIDE 0.2 MG: 1 INJECTION, SOLUTION INTRAMUSCULAR; INTRAVENOUS; SUBCUTANEOUS at 04:11

## 2019-11-25 RX ADMIN — CEFAZOLIN 2 G: 330 INJECTION, POWDER, FOR SOLUTION INTRAMUSCULAR; INTRAVENOUS at 02:11

## 2019-11-25 RX ADMIN — LIDOCAINE HYDROCHLORIDE 80 MG: 20 INJECTION, SOLUTION INTRAVENOUS at 02:11

## 2019-11-25 RX ADMIN — PROPOFOL 200 MG: 10 INJECTION, EMULSION INTRAVENOUS at 02:11

## 2019-11-25 RX ADMIN — MIDAZOLAM HYDROCHLORIDE 2 MG: 1 INJECTION, SOLUTION INTRAMUSCULAR; INTRAVENOUS at 02:11

## 2019-11-25 RX ADMIN — DEXAMETHASONE SODIUM PHOSPHATE 8 MG: 4 INJECTION, SOLUTION INTRAMUSCULAR; INTRAVENOUS at 02:11

## 2019-11-25 RX ADMIN — SODIUM CHLORIDE: 0.9 INJECTION, SOLUTION INTRAVENOUS at 11:11

## 2019-11-25 RX ADMIN — PROPOFOL 50 MG: 10 INJECTION, EMULSION INTRAVENOUS at 02:11

## 2019-11-25 RX ADMIN — ONDANSETRON 4 MG: 2 INJECTION INTRAMUSCULAR; INTRAVENOUS at 04:11

## 2019-11-25 RX ADMIN — FENTANYL CITRATE 25 MCG: 50 INJECTION, SOLUTION INTRAMUSCULAR; INTRAVENOUS at 03:11

## 2019-11-25 RX ADMIN — ONDANSETRON 4 MG: 2 INJECTION INTRAMUSCULAR; INTRAVENOUS at 03:11

## 2019-11-25 RX ADMIN — HYDROCODONE BITARTRATE AND ACETAMINOPHEN 1 TABLET: 5; 325 TABLET ORAL at 04:11

## 2019-11-25 RX ADMIN — PROMETHAZINE HYDROCHLORIDE 6.25 MG: 25 INJECTION INTRAMUSCULAR; INTRAVENOUS at 04:11

## 2019-11-25 NOTE — ANESTHESIA PREPROCEDURE EVALUATION
11/25/2019  Katarina Munguia is a 34 y.o., female.    Anesthesia Evaluation    I have reviewed the Patient Summary Reports.      I have reviewed the Medications.     Review of Systems  Anesthesia Hx:  No problems with previous Anesthesia  History of prior surgery of interest to airway management or planning: Denies Family Hx of Anesthesia complications.   Denies Personal Hx of Anesthesia complications.   Social:  Non-Smoker, Alcohol Use    Hematology/Oncology:        Oncology Comments: Malignant melanoma of right lower extremity     EENT/Dental:EENT/Dental Normal   Cardiovascular:   Hypertension hyperlipidemia    Pulmonary:  Pulmonary Normal    Hepatic/GI:   GERD Liver Disease, (fatty)    Musculoskeletal:  Musculoskeletal Normal    Neurological:  Neurology Normal    Endocrine:  Endocrine Normal    Dermatological:  Skin Normal    Psych:  Psychiatric Normal           Physical Exam  General:  Well nourished    Airway/Jaw/Neck:  Airway Findings: Mouth Opening: Normal Tongue: Normal  General Airway Assessment: Adult       Chest/Lungs:  Chest/Lungs Findings: Clear to auscultation, Normal Respiratory Rate     Heart/Vascular:  Heart Findings: Rate: Normal  Rhythm: Regular Rhythm     Abdomen:  Abdomen Findings:  Normal, Soft, Nontender       Mental Status:  Mental Status Findings:  Alert and Oriented, Cooperative         Anesthesia Plan  Type of Anesthesia, risks & benefits discussed:  Anesthesia Type:  general  Patient's Preference:   Intra-op Monitoring Plan: standard ASA monitors  Intra-op Monitoring Plan Comments:   Post Op Pain Control Plan: multimodal analgesia  Post Op Pain Control Plan Comments:   Induction:   IV  Beta Blocker:  Patient is not currently on a Beta-Blocker (No further documentation required).       Informed Consent: Patient understands risks and agrees with Anesthesia plan.  Questions answered.  Anesthesia consent signed with patient.  ASA Score: 2     Day of Surgery Review of History & Physical:    H&P update referred to the surgeon.         Ready For Surgery From Anesthesia Perspective.

## 2019-11-25 NOTE — TRANSFER OF CARE
"Anesthesia Transfer of Care Note    Patient: Katarina Munguia    Procedure(s) Performed: Procedure(s) (LRB):  EXCISION-WIDE LOCAL-Right ankle (Right)  BIOPSY, LYMPH NODE, SENTINEL-Right groin (Right)  INJECTION, FOR SENTINEL NODE IDENTIFICATION-Right groin (Right)    Patient location: PACU    Anesthesia Type: general    Transport from OR: Transported from OR on 6-10 L/min O2 by face mask with adequate spontaneous ventilation    Post pain: adequate analgesia    Post assessment: no apparent anesthetic complications and tolerated procedure well    Post vital signs: stable    Level of consciousness: sedated and responds to stimulation    Nausea/Vomiting: no nausea/vomiting    Complications: none    Transfer of care protocol was followed      Last vitals:   Visit Vitals  BP (!) 137/90 (BP Location: Left arm, Patient Position: Lying)   Pulse 99   Temp 36.7 °C (98 °F) (Oral)   Resp 18   Ht 5' 5" (1.651 m)   Wt 104.3 kg (230 lb)   LMP 11/10/2019 (Exact Date)   SpO2 97%   Breastfeeding? No   BMI 38.27 kg/m²     "

## 2019-11-25 NOTE — BRIEF OP NOTE
[facility]  Brief Operative Note     SUMMARY     Surgery Date: 11/25/2019     Surgeon(s) and Role:     * Cj Ferrell MD - Primary     * Bong Hathaway MD - Resident - Assisting    Pre-op Diagnosis:  Malignant melanoma of right lower extremity [C43.71]    Post-op Diagnosis:  Post-Op Diagnosis Codes:     * Malignant melanoma of right lower extremity [C43.71]    Procedure(s) (LRB):  EXCISION-WIDE LOCAL-Right ankle (Right)  BIOPSY, LYMPH NODE, SENTINEL-Right groin (Right)  INJECTION, FOR SENTINEL NODE IDENTIFICATION-Right groin (Right)    Anesthesia: General    Description of the findings of the procedure: 1cm wide local excision of right ankle melanoma and right groin sentinel lymph node biopsy    Findings/Key Components: total excision size 3x2.5cm  Right inguinal sentinel lymph node hot and blue; count 1100    Estimated Blood Loss: 5mL         Specimens:   Specimen (12h ago, onward)    None          Discharge Note    SUMMARY     Admit Date: 11/25/2019    Discharge Date and Time:  11/25/2019 3:57 PM    Hospital Course (synopsis of major diagnoses, care, treatment, and services provided during the course of the hospital stay): The patient was admitted and underwent the above listed procedures without apparent complication. Patient discharged home in stable condition.        Final Diagnosis: Post-Op Diagnosis Codes:     * Malignant melanoma of right lower extremity [C43.71]    Disposition: Home or Self Care    Follow Up/Patient Instructions:     Medications:  Reconciled Home Medications:      Medication List      START taking these medications    HYDROcodone-acetaminophen 5-325 mg per tablet  Commonly known as:  NORCO  Take 1 tablet by mouth every 6 (six) hours as needed for Pain.        CONTINUE taking these medications    amLODIPine 5 MG tablet  Commonly known as:  NORVASC  Take 1 tablet (5 mg total) by mouth once daily.     chlorthalidone 25 MG Tab  Commonly known as:  HYGROTEN  Take 1 tablet (25  mg total) by mouth once daily.     Fluarix Quad 8840-8102 (PF) 60 mcg (15 mcg x 4)/0.5 mL Syrg  Generic drug:  flu vacc [id number] 6mos up(PF)  inject as directed     fluticasone propionate 50 mcg/actuation nasal spray  Commonly known as:  FLONASE  2 sprays (100 mcg total) by Each Nare route once daily.     Microgestin Fe 1.5/30 (28) 1.5 mg-30 mcg (21)/75 mg (7) tablet  Generic drug:  norethindrone-ethinyl estradiol-iron  Take 1 tablet by mouth once daily.     multivitamin capsule  Take 1 capsule by mouth once daily.     pantoprazole 40 MG tablet  Commonly known as:  PROTONIX  Take 1 tablet (40 mg total) by mouth once daily.          Discharge Procedure Orders   Diet Adult Regular     Notify your health care provider if you experience any of the following:  redness, tenderness, or signs of infection (pain, swelling, redness, odor or green/yellow discharge around incision site)     Notify your health care provider if you experience any of the following:  severe uncontrolled pain     Notify your health care provider if you experience any of the following:  persistent nausea and vomiting or diarrhea     Notify your health care provider if you experience any of the following:  temperature >100.4     Change dressing (specify)   Order Comments: No dressing needed to cover the groin incision. There is purple skin glue that was placed during surgery. This will fall off in 7-10 days.   You can shower in 24 hours with regular soap and water. Please do not soak in a bathtub or pool for at least 2 weeks after surgery.     For right ankle incision:  Remove surgical dressing in 48 hours. Can shower at that time.  Change dressing daily with vaseline gauze, dry bandage. Can wrap with compression dressing if desired.     Activity as tolerated     Follow-up Information     Cj Ferrell MD In 2 weeks.    Specialty:  General Surgery  Contact information:  [facility]  [facility] [address] [phone]

## 2019-11-26 ENCOUNTER — TELEPHONE (OUTPATIENT)
Dept: ENDOSCOPY | Facility: HOSPITAL | Age: 34
End: 2019-11-26

## 2019-11-26 NOTE — PROGRESS NOTES
Pt discharged to home.  Discharge instructions given, pt and family stated understanding.  Dressing to groin and ankle dry and intact, IV removed.  Pt left via wheelchair with family to home.  Rx delivered to bedside.      [facility]...

## 2019-11-26 NOTE — TELEPHONE ENCOUNTER
Spoke with patient about arrival time and prep instructions.     Arrival time of :  0930      Please check in on the 1st floor of [facility] (hospital, not medical building).     Please eat a light mean the evening before your procedure. Clear liquids past 7pm the day before the procedure. NPO past midnight.       Please take your blood pressure, heart seizure medication the morning of the procedure with a small sip of water (1 hour before your arrival) . Hold all diabetic medication or any other medication the morning of the procedure. Bring any breathing inhalers to hospital with you if applicable. Please bring your pacemaker/defibrillator card if applicable. Leave all valuables at home.  You must have a ride available the day of the procedure.       If you have any questions, please contact us at [phone]. Our unit is open Monday-Friday, 0800 am - 4 pm.         Have you been ordered to hold any blood thinners?  n/a

## 2019-11-26 NOTE — OP NOTE
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    PROCEDURE:  A 1-cm wide local excision with specimen of resection measuring 2.5   x 2.5 cm; injection of isosulfan Lymphazurin blue dye and technetium-labeled   radiocolloid into the intradermal component of the skin around the prior   melanoma biopsy site of the right ankle for intraoperative sentinel lymph node   mapping and identification; identification and mapping of right deep   inguinal/superficial groin sentinel lymph node x1; right groin/inguinal   excisional sentinel lymph node biopsy x1 of the deep system distal to the   inguinal crease.    PROCEDURE IN DETAIL:  The patient underwent informed consent.  The history and   physical examination was reviewed and updated.  The right ankle was marked.  The   prior biopsy site was encircled.  It measured approximately 5 x 5 mm.  We   marked a 1 cm margin around this in a circular fashion to make the margin of   excision 2.5 x 2.5 cm.  The patient was brought to the Operating Room under   general anesthesia with laryngeal mask airway.  She was positioned in the supine   position.  We injected intradermally the four separate aliquots of the   technetium-labeled radiocolloid for approximately a total of 600 microcurie of   injection in four separate intradermal injections around the biopsy site.  We   then injected approximately 2 mL of isosulfan Lymphazurin blue dye intradermally   also within the anticipated resection site.  This was allowed to migrate to the   right groin, while the right lower extremity was prepped and draped in a   sterile fashion.  A hot spot was noted  in the right groin.  There was no   activity in the popliteal region as the popliteal region was probed with the   handheld intraoperative gamma probe.  We initially performed the wide local   excision with a scalpel on the skin.  The specimen again was 2.5 x 2.5 cm.  We   took full-thickness skin and subcutaneous tissue, leaving only the underlying   fascia/tendon on the anterior instep of the right ankle.  The specimen was   oriented with a short stitch superiorly and a long stitch laterally and   submitted to pathology for permanent sectioning.  Hemostasis was achieved with   electrocautery.  We dressed the wound with a nonadherent Xeroform gauze followed   by sterile 2 x 2 gauze followed by Kerlix roll and Ace bandage.    We then turned our attention to the right groin with no activity in the   popliteal region.  The hot spot was noted in the right groin.  A small 3 to 4 cm   oblique incision was made parallel and inferior to the inguinal crease along   the anterior medial thigh.  The skin was incised sharply.  The subcutaneous   tissue and superficial fascia was divided with cautery.  We identified a hot   blue radioactive lymph node, which was circumferentially dissected and excised.    The lymph node was excised and it was hot and blue with an ex vivo count of   1100.  Background residual radioactivity counts were essentially absent and   negligible and near 0 indicating adequate sentinel lymph node mapping,   identification and biopsy for inguinal staging of the right lower extremity   melanoma.  With the sentinel lymph node removed and no background residual   radioactivity of any significance and no further blue dye staining in the right   groin, we then closed the groin reapproximating the superficial fascia with   intermittent deep three-point fixation down to the underlying muscular fascia   with 2-0 Vicryl sutures to obliterate any potential dead space.  The   subcutaneous tissue and deep dermal layers  were further closed with 3-0 Vicryl   suture to obliterate potential dead space and the skin was closed with a running   4-0 Monocryl subcuticular skin closure.  Hemoclips had been applied to small   vessels during the sentinel lymph node biopsy.  The skin was closed with a   running 4-0 Monocryl subcuticular skin closure.  Sterile skin Dermabond was   applied, followed by sterile dressing.  She tolerated the procedure well without   complication.  Both specimens were sent to Pathology for permanent sectioning.    Estimated blood loss was minimal.  All needle, instrument and sponge counts   were correct.  She was turned over to Anesthesia for extubation and transport to   the recovery area in a satisfactory condition.      RLC/HN  dd: 11/25/2019 15:47:08 (CST)  td: 11/25/2019 20:17:39 (CST)  Doc ID   #[id number]  Job ID #[id number]    CC:

## 2019-11-26 NOTE — ANESTHESIA POSTPROCEDURE EVALUATION
Anesthesia Post Evaluation    Patient: Katarina Munguia    Procedure(s) Performed: Procedure(s) (LRB):  EXCISION-WIDE LOCAL-Right ankle (Right)  BIOPSY, LYMPH NODE, SENTINEL-Right groin (Right)  INJECTION, FOR SENTINEL NODE IDENTIFICATION-Right groin (Right)    Final Anesthesia Type: general    Patient location during evaluation: PACU  Patient participation: Yes- Able to Participate  Level of consciousness: awake and alert and oriented  Post-procedure vital signs: reviewed and stable  Pain management: adequate  Airway patency: patent    PONV status at discharge: No PONV  Anesthetic complications: no      Cardiovascular status: blood pressure returned to baseline and hemodynamically stable  Respiratory status: unassisted  Hydration status: euvolemic  Follow-up not needed.          Vitals Value Taken Time   /69 11/25/2019  6:16 PM   Temp 36.4 °C (97.5 °F) 11/25/2019  5:39 PM   Pulse 92 11/25/2019  6:17 PM   Resp 18 11/25/2019  6:15 PM   SpO2 92 % 11/25/2019  6:17 PM   Vitals shown include unvalidated device data.      Event Time     Out of Recovery 16:30:00          Pain/Vasiliy Score: Pain Rating Prior to Med Admin: 6 (11/25/2019  5:10 PM)  Vasiliy Score: 10 (11/25/2019  5:00 PM)

## 2019-11-29 ENCOUNTER — ANESTHESIA (OUTPATIENT)
Dept: ENDOSCOPY | Facility: HOSPITAL | Age: 34
End: 2019-11-29
Payer: COMMERCIAL

## 2019-11-29 ENCOUNTER — ANESTHESIA EVENT (OUTPATIENT)
Dept: ENDOSCOPY | Facility: HOSPITAL | Age: 34
End: 2019-11-29
Payer: COMMERCIAL

## 2019-11-29 ENCOUNTER — HOSPITAL ENCOUNTER (OUTPATIENT)
Facility: HOSPITAL | Age: 34
Discharge: HOME | End: 2019-11-29
Attending: INTERNAL MEDICINE | Admitting: INTERNAL MEDICINE
Payer: COMMERCIAL

## 2019-11-29 VITALS
WEIGHT: 230 LBS | TEMPERATURE: 98 F | OXYGEN SATURATION: 97 % | RESPIRATION RATE: 18 BRPM | SYSTOLIC BLOOD PRESSURE: 122 MMHG | DIASTOLIC BLOOD PRESSURE: 65 MMHG | HEART RATE: 71 BPM | BODY MASS INDEX: 38.32 KG/M2 | HEIGHT: 65 IN

## 2019-11-29 DIAGNOSIS — R10.13 DYSPEPSIA: ICD-10-CM

## 2019-11-29 PROCEDURE — 63600175 PHARM REV CODE 636 W HCPCS: Performed by: NURSE ANESTHETIST, CERTIFIED REGISTERED

## 2019-11-29 PROCEDURE — 43239 PR EGD, FLEX, W/BIOPSY, SGL/MULTI: ICD-10-PCS | Mod: ,,, | Performed by: INTERNAL MEDICINE

## 2019-11-29 PROCEDURE — 43239 EGD BIOPSY SINGLE/MULTIPLE: CPT | Performed by: INTERNAL MEDICINE

## 2019-11-29 PROCEDURE — 88305 TISSUE EXAM BY PATHOLOGIST: CPT | Mod: 26,,, | Performed by: PATHOLOGY

## 2019-11-29 PROCEDURE — 43239 EGD BIOPSY SINGLE/MULTIPLE: CPT | Mod: ,,, | Performed by: INTERNAL MEDICINE

## 2019-11-29 PROCEDURE — 37000009 HC ANESTHESIA EA ADD 15 MINS: Performed by: INTERNAL MEDICINE

## 2019-11-29 PROCEDURE — 88305 TISSUE EXAM BY PATHOLOGIST: ICD-10-PCS | Mod: 26,,, | Performed by: PATHOLOGY

## 2019-11-29 PROCEDURE — 63600175 PHARM REV CODE 636 W HCPCS: Performed by: INTERNAL MEDICINE

## 2019-11-29 PROCEDURE — 88305 TISSUE EXAM BY PATHOLOGIST: CPT | Performed by: PATHOLOGY

## 2019-11-29 PROCEDURE — 27201012 HC FORCEPS, HOT/COLD, DISP: Performed by: INTERNAL MEDICINE

## 2019-11-29 PROCEDURE — 37000008 HC ANESTHESIA 1ST 15 MINUTES: Performed by: INTERNAL MEDICINE

## 2019-11-29 RX ORDER — LIDOCAINE HCL/PF 100 MG/5ML
SYRINGE (ML) INTRAVENOUS
Status: DISCONTINUED | OUTPATIENT
Start: 2019-11-29 | End: 2019-11-29

## 2019-11-29 RX ORDER — SODIUM CHLORIDE 0.9 % (FLUSH) 0.9 %
10 SYRINGE (ML) INJECTION
Status: DISCONTINUED | OUTPATIENT
Start: 2019-11-29 | End: 2019-11-29 | Stop reason: HOSPADM

## 2019-11-29 RX ORDER — PROPOFOL 10 MG/ML
VIAL (ML) INTRAVENOUS
Status: DISCONTINUED | OUTPATIENT
Start: 2019-11-29 | End: 2019-11-29

## 2019-11-29 RX ORDER — FENTANYL CITRATE 50 UG/ML
INJECTION, SOLUTION INTRAMUSCULAR; INTRAVENOUS
Status: DISCONTINUED | OUTPATIENT
Start: 2019-11-29 | End: 2019-11-29

## 2019-11-29 RX ORDER — SODIUM CHLORIDE 9 MG/ML
INJECTION, SOLUTION INTRAVENOUS CONTINUOUS
Status: DISCONTINUED | OUTPATIENT
Start: 2019-11-29 | End: 2019-11-29 | Stop reason: HOSPADM

## 2019-11-29 RX ADMIN — PROPOFOL 70 MG: 10 INJECTION, EMULSION INTRAVENOUS at 10:11

## 2019-11-29 RX ADMIN — PROPOFOL 30 MG: 10 INJECTION, EMULSION INTRAVENOUS at 10:11

## 2019-11-29 RX ADMIN — LIDOCAINE HYDROCHLORIDE 100 MG: 20 INJECTION, SOLUTION INTRAVENOUS at 10:11

## 2019-11-29 RX ADMIN — FENTANYL CITRATE 100 MCG: 50 INJECTION, SOLUTION INTRAMUSCULAR; INTRAVENOUS at 10:11

## 2019-11-29 RX ADMIN — SODIUM CHLORIDE: 0.9 INJECTION, SOLUTION INTRAVENOUS at 10:11

## 2019-11-29 NOTE — OR NURSING
Bite block placed.  
Bite block removed mouth intact.  
Family updated as to patient's status.  
Alert and oriented to person, place, time/situation. normal mood and affect. no apparent risk to self or others.

## 2019-11-29 NOTE — ANESTHESIA PREPROCEDURE EVALUATION
11/29/2019  Katarina Munguia is a 34 y.o., female here for EGD under mac    S/p GEN 11/25/19 with biopsy with [facility]    Anesthesia Evaluation    I have reviewed the Patient Summary Reports.     I have reviewed the Medications.     Review of Systems  Anesthesia Hx:  No problems with previous Anesthesia  History of prior surgery of interest to airway management or planning: Denies Family Hx of Anesthesia complications.   Denies Personal Hx of Anesthesia complications.   Social:  Non-Smoker, Alcohol Use    Hematology/Oncology:        Oncology Comments: Malignant melanoma of right lower extremity     EENT/Dental:EENT/Dental Normal   Cardiovascular:   Hypertension hyperlipidemia    Pulmonary:  Pulmonary Normal    Hepatic/GI:   GERD Liver Disease, (fatty)    Musculoskeletal:  Musculoskeletal Normal    Neurological:  Neurology Normal    Endocrine:  Endocrine Normal    Dermatological:  Skin Normal    Psych:  Psychiatric Normal           Physical Exam  General:  Well nourished    Airway/Jaw/Neck:  Airway Findings: Mouth Opening: Normal Tongue: Normal  General Airway Assessment: Adult       Chest/Lungs:  Chest/Lungs Findings: Clear to auscultation, Normal Respiratory Rate     Heart/Vascular:  Heart Findings: Rate: Normal  Rhythm: Regular Rhythm     Abdomen:  Abdomen Findings:  Normal, Soft, Nontender       Mental Status:  Mental Status Findings:  Alert and Oriented, Cooperative         Anesthesia Plan  Type of Anesthesia, risks & benefits discussed:  Anesthesia Type:  MAC  Patient's Preference:   Intra-op Monitoring Plan: standard ASA monitors  Intra-op Monitoring Plan Comments:   Post Op Pain Control Plan: per primary service following discharge from PACU  Post Op Pain Control Plan Comments:   Induction:   IV  Beta Blocker:  Patient is not currently on a Beta-Blocker (No further documentation required).       Informed  Consent: Patient understands risks and agrees with Anesthesia plan.  Questions answered. Anesthesia consent signed with patient.  ASA Score: 2     Day of Surgery Review of History & Physical:    H&P update referred to the surgeon.         Ready For Surgery From Anesthesia Perspective.

## 2019-11-29 NOTE — ANESTHESIA POSTPROCEDURE EVALUATION
Anesthesia Post Evaluation    Patient: Katarina Munguia    Procedure(s) Performed: Procedure(s) (LRB):  ESOPHAGOGASTRODUODENOSCOPY (EGD) (N/A)    Final Anesthesia Type: MAC    Patient location during evaluation: GI PACU  Patient participation: Yes- Able to Participate  Level of consciousness: awake and alert and oriented  Post-procedure vital signs: reviewed and stable  Pain management: adequate  Airway patency: patent    PONV status at discharge: No PONV  Anesthetic complications: no      Cardiovascular status: blood pressure returned to baseline, hemodynamically stable and stable  Respiratory status: unassisted, spontaneous ventilation and room air  Hydration status: euvolemic  Follow-up not needed.          Vitals Value Taken Time   /61 11/29/2019  9:59 AM   Temp 36.9 °C (98.4 °F) 11/29/2019  9:59 AM   Pulse 76 11/29/2019  9:59 AM   Resp 16 11/29/2019  9:59 AM   SpO2 99 % 11/29/2019  9:59 AM         No case tracking events are documented in the log.      Pain/Vasiliy Score: No data recorded

## 2019-11-29 NOTE — [employer] PATIENT INSTRUCTIONS
Discharge Summary/Instructions after an Endoscopic Procedure  Patient Name: Katarina Munguia  Patient MRN: [id number]  Patient YOB: 1985 Friday, November 29, 2019  Katty Garcia MD  RESTRICTIONS:  During your procedure today, you received medications for sedation.  These   medications may affect your judgment, balance and coordination.  Therefore,   for 24 hours, you have the following restrictions:   - DO NOT drive a car, operate machinery, make legal/financial decisions,   sign important papers or drink alcohol.    ACTIVITY:  Today: no heavy lifting, straining or running due to procedural   sedation/anesthesia.  The following day: return to full activity including work.  DIET:  Eat and drink normally unless instructed otherwise.     TREATMENT FOR COMMON SIDE EFFECTS:  - Mild abdominal pain, nausea, belching, bloating or excessive gas:  rest,   eat lightly and use a heating pad.  - Sore Throat: treat with throat lozenges and/or gargle with warm salt   water.  - Because air was used during the procedure, expelling large amounts of air   from your rectum or belching is normal.  - If a bowel prep was taken, you may not have a bowel movement for 1-3 days.    This is normal.  SYMPTOMS TO WATCH FOR AND REPORT TO YOUR PHYSICIAN:  1. Abdominal pain or bloating, other than gas cramps.  2. Chest pain.  3. Back pain.  4. Signs of infection such as: chills or fever occurring within 24 hours   after the procedure.  5. Rectal bleeding, which would show as bright red, maroon, or black stools.   (A tablespoon of blood from the rectum is not serious, especially if   hemorrhoids are present.)  6. Vomiting.  7. Weakness or dizziness.  GO DIRECTLY TO THE NEAREST EMERGENCY ROOM IF YOU HAVE ANY OF THE FOLLOWING:      Difficulty breathing              Chills and/or fever over 101 F   Persistent vomiting and/or vomiting blood   Severe abdominal pain   Severe chest pain   Black, tarry stools   Bleeding- more than one  tablespoon   Any other symptom or condition that you feel may need urgent attention  Your doctor recommends these additional instructions:  If any biopsies were taken, your doctors clinic will contact you in 1 to 2   weeks with any results.  - Discharge patient to home (via wheelchair).   - Patient has a contact number available for emergencies.  The signs and   symptoms of potential delayed complications were discussed with the   patient.  Return to normal activities tomorrow.  Written discharge   instructions were provided to the patient.   - Resume previous diet.   - Continue present medications.   - Await pathology results.   - Continue pantoprazole for now  For questions, problems or results please call your physician - Katty Garcia MD at Work:  [phone].  EMERGENCY PHONE NUMBER: [phone],  LAB RESULTS: [phone]  IF A COMPLICATION OR EMERGENCY SITUATION ARISES AND YOU ARE UNABLE TO REACH   YOUR PHYSICIAN - GO DIRECTLY TO THE EMERGENCY ROOM.  Katty Garcia MD  11/29/2019 11:02:23 AM  This report has been verified and signed electronically.  [employer]

## 2019-11-29 NOTE — TRANSFER OF CARE
"Anesthesia Transfer of Care Note    Patient: Katarina Munguia    Procedure(s) Performed: Procedure(s) (LRB):  ESOPHAGOGASTRODUODENOSCOPY (EGD) (N/A)    Patient location: GI    Anesthesia Type: MAC    Transport from OR: Transported from OR on room air with adequate spontaneous ventilation    Post pain: adequate analgesia    Post assessment: no apparent anesthetic complications and tolerated procedure well    Post vital signs: stable    Level of consciousness: awake, alert and oriented    Nausea/Vomiting: no nausea/vomiting    Complications: none    Transfer of care protocol was followed      Last vitals:   Visit Vitals  /61   Pulse 76   Temp 36.9 °C (98.4 °F) (Temporal)   Resp 16   Ht 5' 5" (1.651 m)   Wt 104.3 kg (230 lb)   LMP 11/10/2019 (Exact Date)   SpO2 99%   Breastfeeding? No   BMI 38.27 kg/m²     "

## 2019-12-01 ENCOUNTER — NURSE TRIAGE (OUTPATIENT)
Dept: ADMINISTRATIVE | Facility: CLINIC | Age: 34
End: 2019-12-01

## 2019-12-01 ENCOUNTER — HOSPITAL ENCOUNTER (EMERGENCY)
Facility: HOSPITAL | Age: 34
Discharge: HOME | End: 2019-12-01
Attending: EMERGENCY MEDICINE
Payer: COMMERCIAL

## 2019-12-01 VITALS
DIASTOLIC BLOOD PRESSURE: 77 MMHG | HEART RATE: 82 BPM | RESPIRATION RATE: 16 BRPM | SYSTOLIC BLOOD PRESSURE: 126 MMHG | OXYGEN SATURATION: 96 % | WEIGHT: 230 LBS | BODY MASS INDEX: 38.32 KG/M2 | HEIGHT: 65 IN | TEMPERATURE: 99 F

## 2019-12-01 DIAGNOSIS — M79.604 RIGHT LEG PAIN: ICD-10-CM

## 2019-12-01 LAB
ANION GAP SERPL CALC-SCNC: 7 MMOL/L (ref 8–16)
B-HCG UR QL: NEGATIVE
BUN SERPL-MCNC: 9 MG/DL (ref 6–20)
CALCIUM SERPL-MCNC: 9.3 MG/DL (ref 8.7–10.5)
CHLORIDE SERPL-SCNC: 105 MMOL/L (ref 95–110)
CO2 SERPL-SCNC: 28 MMOL/L (ref 23–29)
CREAT SERPL-MCNC: 0.6 MG/DL (ref 0.5–1.4)
CTP QC/QA: YES
EST. GFR  (AFRICAN AMERICAN): >60 ML/MIN/1.73 M^2
EST. GFR  (NON AFRICAN AMERICAN): >60 ML/MIN/1.73 M^2
GLUCOSE SERPL-MCNC: 116 MG/DL (ref 70–110)
POTASSIUM SERPL-SCNC: 4 MMOL/L (ref 3.5–5.1)
SODIUM SERPL-SCNC: 140 MMOL/L (ref 136–145)

## 2019-12-01 PROCEDURE — 81025 URINE PREGNANCY TEST: CPT | Performed by: EMERGENCY MEDICINE

## 2019-12-01 PROCEDURE — 80048 BASIC METABOLIC PNL TOTAL CA: CPT

## 2019-12-01 PROCEDURE — 99284 EMERGENCY DEPT VISIT MOD MDM: CPT | Mod: 25

## 2019-12-01 NOTE — ED PROVIDER NOTES
"Encounter Date: 12/1/2019    SCRIBE #1 NOTE: I, Yvonne Zana, am scribing for, and in the presence of,  Macario Giraldo Jr., MD. I have scribed the following portions of the note - Other sections scribed: HPI,ROS.       History     Chief Complaint   Patient presents with    Leg Pain     The patient reports pain in right calf that started after having surgery to remove melanoma and a lymph node in right groin. Denies swelling to leg. Denies sob, chest pain.     CC: Calf pain    HPI: This is a 34 y.o.female patient, with a PMHx of Hypertension and Melanoma, presenting to the ED with a complaint of right calf pain, that began x3 days ago. Patient reports having lymph nodes removed from her right groin area and a melanoma removed in her right lower extremity on 11/25/19. She also reports bruising to her right groin area, near her incision site. Denies having any restrictions or limitations from the surgeries. She reports the pain is worse with walking, flexing, and standing. She states its difficult to ambulate. Patient describes the pain as"cramping". Patient denies any joint swelling, fever, chills, shortness of breath, chest pain, neck pain, back pain, abdominal pain, rash, headaches, congestion, rhinorrhea, cough, sore throat, ear pain, eye pain, blurred vision, nausea, vomiting, diarrhea, dysuria, or any other associated symptoms. No known drug allergies. Denies Hx of DM or PE. No use of anticoagulants.        The history is provided by the patient.     Review of patient's allergies indicates:  No Known Allergies  Past Medical History:   Diagnosis Date    Allergy     Hypertension     Melanoma      Past Surgical History:   Procedure Laterality Date    FOOT SURGERY      INJECTION FOR SENTINEL NODE IDENTIFICATION Right 11/25/2019    Procedure: INJECTION, FOR SENTINEL NODE IDENTIFICATION-Right groin;  Surgeon: Cj Ferrell MD;  Location: [facility] OR [address] [place] [address];  Service: General;  Laterality: Right;    " SENTINEL LYMPH NODE BIOPSY Right 11/25/2019    Procedure: BIOPSY, LYMPH NODE, SENTINEL-Right groin;  Surgeon: Cj Ferrell MD;  Location: [facility] OR [address] [place] [address];  Service: General;  Laterality: Right;    TONSILLECTOMY       Family History   Problem Relation Age of Onset    Hypertension Mother     Hyperlipidemia Mother     Transient ischemic attack Mother     Cancer Father     Lung cancer Father     Heart disease Maternal Grandfather     Heart disease Sister      Social History     Tobacco Use    Smoking status: Never Smoker    Smokeless tobacco: Never Used   Substance Use Topics    Alcohol use: Yes     Alcohol/week: 1.0 standard drinks     Types: 1 Cans of beer per week     Frequency: 2-4 times a month     Drinks per session: 1 or 2     Binge frequency: Never     Comment: occasional    Drug use: No     Review of Systems   Constitutional: Negative for chills and fever.   HENT: Negative for congestion, ear pain, rhinorrhea and sore throat.    Eyes: Negative for pain and visual disturbance.   Respiratory: Negative for cough and shortness of breath.    Cardiovascular: Negative for chest pain.   Gastrointestinal: Negative for abdominal pain, diarrhea, nausea and vomiting.   Genitourinary: Negative for dysuria.   Musculoskeletal: Positive for arthralgias and myalgias. Negative for back pain, joint swelling and neck pain.   Skin: Positive for wound (bruising). Negative for rash.   Neurological: Negative for headaches.       Physical Exam     Initial Vitals [12/01/19 1528]   BP Pulse Resp Temp SpO2   (!) 144/75 87 16 97.8 °F (36.6 °C) 97 %      MAP       --         Physical Exam    Nursing note and vitals reviewed.  Constitutional: She appears well-developed and well-nourished. She is not diaphoretic. No distress.   HENT:   Head: Normocephalic and atraumatic.   Right Ear: External ear normal.   Left Ear: External ear normal.   Nose: Nose normal.   Mouth/Throat: Oropharynx is clear and moist.   Eyes: Conjunctivae  and EOM are normal. Pupils are equal, round, and reactive to light. Right eye exhibits no discharge. Left eye exhibits no discharge. No scleral icterus.   Neck: Normal range of motion. Neck supple.   Cardiovascular: Normal rate, regular rhythm, normal heart sounds and intact distal pulses.   No murmur heard.  Musculoskeletal: Normal range of motion. She exhibits no edema or tenderness.   Examination of the right lower extremity reveals ankle dressing which is clean, dry, intact.  Removal reveals well circumscribed ulceration without any surrounding erythema, purulent drainage, or warmth.  There is some tenderness with palpation of the calf.  There is no evidence of edema or erythema to that area.  Compartments are soft.  There is a small well-healing surgical incision to the right inguinal area.  No evidence of cellulitic changes.  There is a small area of ecchymosis distal to this wound.   Neurological: She is alert and oriented to person, place, and time. She has normal strength. No cranial nerve deficit or sensory deficit.   Skin: Skin is warm and dry. No rash noted. No erythema. No pallor.   Psychiatric: She has a normal mood and affect. Her behavior is normal. Judgment and thought content normal.         ED Course   Procedures  Labs Reviewed   BASIC METABOLIC PANEL - Abnormal; Notable for the following components:       Result Value    Glucose 116 (*)     Anion Gap 7 (*)     All other components within normal limits   POCT URINE PREGNANCY          Imaging Results          US Lower Extremity Veins Right (Final result)  Result time 12/01/19 16:53:40    Final result by Tao Melendez MD (12/01/19 16:53:40)                 Impression:      No evidence of deep venous thrombosis in the right lower extremity.      Electronically signed by: Tao Melendez MD  Date:    12/01/2019  Time:    16:53             Narrative:    EXAMINATION:  US LOWER EXTREMITY VEINS RIGHT    CLINICAL HISTORY:  Pain in right  leg    TECHNIQUE:  Duplex and color flow Doppler evaluation and graded compression of the right lower extremity veins was performed.    COMPARISON:  None    FINDINGS:  Duplex and color flow Doppler evaluation does not reveal any evidence of acute venous thrombosis in the common femoral, superficial femoral, greater saphenous, popliteal, peroneal, anterior tibial and posterior tibial veins of the right lower extremity.  There is no reflux to suggest valvular incompetence.                                 Medical Decision Making:   ED Management:  This is the emergent evaluation of a 34-year-old female presents emergency department for evaluation of pain to the right calf postoperatively.  Differential diagnosis at the time of initial evaluation included, but was not limited to:  DVT, metabolic disturbance, cellulitis, pain related to recent surgical procedure.  Patient states that 7 days ago, she had outpatient surgery done to remove melanoma to the area of the distal right tibia.  She also had lymph node removal in the right inguinal area.   She has had no redness, swelling, fever.  There is no evidence to suggest cellulitis.  The wounds appear to be healing properly and dressings are clean, dry, and intact.       Dressing was changed.  The patient has normal metabolic panel without evidence to suggest electrolyte disturbance causing cramping of the extremity.  Ultrasound of the lower extremity revealed no evidence of DVT.  There is no evidence of compartment syndrome or other acute abnormalities.  No evidence of cellulitis or abscess.  Given the above, the patient is stable for discharge with follow-up with her surgeon.  She was advised to continue postoperative treatment as instructed.  Advised return for new or worsening symptoms such as severe worsening of pain, spreading redness, fever, chills, significant swelling of the extremity.            Scribe Attestation:   Scribe #1: I performed the above scribed  service and the documentation accurately describes the services I performed. I attest to the accuracy of the note.                          Clinical Impression:       ICD-10-CM ICD-9-CM   1. Right leg pain M79.604 729.5                Scribe attestation: I, Macario Giraldo MD, personally performed the services described in this documentation. All medical record entries made by the scribe were at my direction and in my presence.  I have reviewed the chart and agree that the record reflects my personal performance and is accurate and complete.             Macario Giraldo Jr., MD  12/01/19 [id number]

## 2019-12-01 NOTE — DISCHARGE INSTRUCTIONS
Please continue postoperative wound care as discussed with your surgeon.  Please return if you develop new or worsening symptoms such as fever, spreading redness, significant swelling or hardness of the extremity.  Please follow up with your operating physician as instructed.

## 2019-12-01 NOTE — TELEPHONE ENCOUNTER
"11/25, removed melanoma from ankle, removed lymph node from groin  Calf/muscles in legs, feels like a constant "misael horse" in legs 7/10  Initially post op was only having pain in ankle. Patient states she wouldn't be taking pain medication anymore if it weren't for this newer pain to calf.  Bruising near groin incision  Recommended to be seen today within next four hours. Discussed ED.    Reason for Disposition   [1] Thigh or calf pain AND [2] only 1 side AND [3] present > 1 hour    Additional Information   Negative: Looks like a broken bone or dislocated joint (e.g., crooked or deformed)   Negative: Sounds like a life-threatening emergency to the triager   Negative: Followed a leg injury   Negative: Leg swelling is main symptom   Negative: Back pain radiating (shooting) into leg(s)   Negative: Knee pain is main symptom   Negative: Ankle pain is main symptom   Negative: Pregnant   Negative: Chest pain   Negative: Difficulty breathing   Negative: Entire foot is cool or blue in comparison to other side   Negative: Unable to walk   Negative: [1] Red area or streak AND [2] fever   Negative: [1] Swollen joint AND [2] fever   Negative: [1] Cast on leg or ankle AND [2] now increased pain   Negative: Patient sounds very sick or weak to the triager   Negative: [1] SEVERE pain (e.g., excruciating, unable to do any normal activities) AND [2] not improved after 2 hours of pain medicine    Protocols used: LEG PAIN-A-AH      "

## 2019-12-01 NOTE — ED TRIAGE NOTES
"Pt presents to the ED via personal transportation reporting that she had surgery on Sunday in her right groin to remove a melanoma and has been having "cramping pain" to her right calf since then. Pt denies any c/o chest pain or SOB. Denies any previous history of blood clots before. Pt AAOx4. Pt hooked up to monitor.  "

## 2019-12-03 ENCOUNTER — TELEPHONE (OUTPATIENT)
Dept: SURGERY | Facility: CLINIC | Age: 34
End: 2019-12-03

## 2019-12-03 ENCOUNTER — PATIENT MESSAGE (OUTPATIENT)
Dept: SURGERY | Facility: CLINIC | Age: 34
End: 2019-12-03

## 2019-12-03 ENCOUNTER — TELEPHONE (OUTPATIENT)
Dept: INTERNAL MEDICINE | Facility: CLINIC | Age: 34
End: 2019-12-03

## 2019-12-03 DIAGNOSIS — C43.71 MALIGNANT MELANOMA OF RIGHT LOWER EXTREMITY: Primary | ICD-10-CM

## 2019-12-03 NOTE — TELEPHONE ENCOUNTER
Spoke to patient and scheduled appt in March----pls advise if patient will need lab prior to appt---schedule lab appt a few days prior at 9 AM and mailed both appt slips to patient

## 2019-12-03 NOTE — TELEPHONE ENCOUNTER
Spoke with patient to get her scheduled for appointment this Thursday per Dr. Ferrell. Patient scheduled for 12/5 at 1:30 PM. Location, time, and date reviewed. Patient verbalized understanding.

## 2019-12-03 NOTE — TELEPHONE ENCOUNTER
Attempted to call patient to offer appointment for Thursday with Dr. Ferrell. Unable to reach at this time, voicemail left with my name and direct number.

## 2019-12-05 ENCOUNTER — OFFICE VISIT (OUTPATIENT)
Dept: SURGERY | Facility: CLINIC | Age: 34
End: 2019-12-05
Payer: COMMERCIAL

## 2019-12-05 ENCOUNTER — TELEPHONE (OUTPATIENT)
Dept: WOUND CARE | Facility: CLINIC | Age: 34
End: 2019-12-05

## 2019-12-05 ENCOUNTER — CLINICAL SUPPORT (OUTPATIENT)
Dept: REHABILITATION | Facility: HOSPITAL | Age: 34
End: 2019-12-05
Attending: SURGERY
Payer: COMMERCIAL

## 2019-12-05 ENCOUNTER — PATIENT OUTREACH (OUTPATIENT)
Dept: ADMINISTRATIVE | Facility: OTHER | Age: 34
End: 2019-12-05

## 2019-12-05 VITALS
SYSTOLIC BLOOD PRESSURE: 123 MMHG | DIASTOLIC BLOOD PRESSURE: 74 MMHG | HEIGHT: 65 IN | WEIGHT: 229.94 LBS | BODY MASS INDEX: 38.31 KG/M2 | HEART RATE: 91 BPM

## 2019-12-05 DIAGNOSIS — C43.71 MALIGNANT MELANOMA OF RIGHT LOWER EXTREMITY INCLUDING HIP: ICD-10-CM

## 2019-12-05 DIAGNOSIS — M25.571 ACUTE RIGHT ANKLE PAIN: ICD-10-CM

## 2019-12-05 DIAGNOSIS — R29.898 ANKLE WEAKNESS: ICD-10-CM

## 2019-12-05 DIAGNOSIS — R26.89 GAIT, ANTALGIC: ICD-10-CM

## 2019-12-05 DIAGNOSIS — M79.661 RIGHT CALF PAIN: ICD-10-CM

## 2019-12-05 DIAGNOSIS — M25.671 DECREASED RANGE OF MOTION OF RIGHT ANKLE: ICD-10-CM

## 2019-12-05 DIAGNOSIS — C43.71: Primary | ICD-10-CM

## 2019-12-05 PROCEDURE — 97161 PT EVAL LOW COMPLEX 20 MIN: CPT | Mod: PN

## 2019-12-05 PROCEDURE — 99024 POSTOP FOLLOW-UP VISIT: CPT | Mod: S$GLB,,, | Performed by: SURGERY

## 2019-12-05 PROCEDURE — 97110 THERAPEUTIC EXERCISES: CPT | Mod: PN

## 2019-12-05 PROCEDURE — 99024 PR POST-OP FOLLOW-UP VISIT: ICD-10-PCS | Mod: S$GLB,,, | Performed by: SURGERY

## 2019-12-05 PROCEDURE — 99999 PR PBB SHADOW E&M-EST. PATIENT-LVL III: ICD-10-PCS | Mod: PBBFAC,,, | Performed by: SURGERY

## 2019-12-05 PROCEDURE — 99999 PR PBB SHADOW E&M-EST. PATIENT-LVL III: CPT | Mod: PBBFAC,,, | Performed by: SURGERY

## 2019-12-05 NOTE — TELEPHONE ENCOUNTER
Called no answer left voice message asking patient to return call to [phone] to schedule appointment with wound care.

## 2019-12-05 NOTE — TELEPHONE ENCOUNTER
----- Message from Suzan Munguia RN sent at 12/5/2019  2:15 PM CST -----  Good afternoon,     Dr. Ferrell would like patient to follow up in wound care clinic. S/p WLE melanoma Right ankle 11/25/19. Please contact patient to schedule.    Thank you,    Suzan Munguia, AGUEDA  [phone]

## 2019-12-05 NOTE — PROGRESS NOTES
General Surgery Clinic  Progress Note    SUBJECTIVE:     Chief Complaint   Patient presents with    Post-op Evaluation     History of Present Illness:  Katarina Munguia is a 34 y.o. female s/p WLE of a right ankle melanoma on 11/25/19 who returns today for her initial post-op visit. Initially lesion was a T1a on shave biopsy with a depth of least 0.65cm.   She is still having a lot of pain especially with ankle flexion. Was seen in the ED last week for calf pain and work-up was negative for a DVT. She is able to walk but not long distances due to pain. Pathology is still pending.        Review of patient's allergies indicates:  No Known Allergies    Current Outpatient Medications   Medication Sig Dispense Refill    amLODIPine (NORVASC) 5 MG tablet Take 1 tablet (5 mg total) by mouth once daily. 30 tablet 3    chlorthalidone (HYGROTEN) 25 MG Tab Take 1 tablet (25 mg total) by mouth once daily. 30 tablet 3    flu vacc [id number] 6mos up,PF, 60 mcg (15 mcg x 4)/0.5 mL Syrg inject as directed 0.5 mL 0    HYDROcodone-acetaminophen (NORCO) 5-325 mg per tablet Take 1 tablet by mouth every 6 (six) hours as needed for Pain. 25 tablet 0    MICROGESTIN FE 1.5/30, 28, 1.5 mg-30 mcg (21)/75 mg (7) tablet Take 1 tablet by mouth once daily.       multivitamin capsule Take 1 capsule by mouth once daily.      pantoprazole (PROTONIX) 40 MG tablet Take 1 tablet (40 mg total) by mouth once daily. 90 tablet 1     No current facility-administered medications for this visit.        Past Medical History:   Diagnosis Date    Allergy     Hypertension     Melanoma      Past Surgical History:   Procedure Laterality Date    ESOPHAGOGASTRODUODENOSCOPY N/A 11/29/2019    Procedure: ESOPHAGOGASTRODUODENOSCOPY (EGD);  Surgeon: Katty Garcia MD;  Location: [facility];  Service: Endoscopy;  Laterality: N/A;    FOOT SURGERY      INJECTION FOR SENTINEL NODE IDENTIFICATION Right 11/25/2019    Procedure: INJECTION, FOR SENTINEL NODE  "IDENTIFICATION-Right groin;  Surgeon: Cj Ferrell MD;  Location: [facility] OR [address] [place] [address];  Service: General;  Laterality: Right;    SENTINEL LYMPH NODE BIOPSY Right 11/25/2019    Procedure: BIOPSY, LYMPH NODE, SENTINEL-Right groin;  Surgeon: Cj Ferrell MD;  Location: [address];  Service: General;  Laterality: Right;    TONSILLECTOMY       Family History   Problem Relation Age of Onset    Hypertension Mother     Hyperlipidemia Mother     Transient ischemic attack Mother     Cancer Father     Lung cancer Father     Heart disease Maternal Grandfather     Heart disease Sister      Social History     Tobacco Use    Smoking status: Never Smoker    Smokeless tobacco: Never Used   Substance Use Topics    Alcohol use: Yes     Alcohol/week: 1.0 standard drinks     Types: 1 Cans of beer per week     Frequency: 2-4 times a month     Drinks per session: 1 or 2     Binge frequency: Never     Comment: occasional    Drug use: No        Review of Systems:  Review of Systems   Constitutional: Negative.    HENT: Negative.    Eyes: Negative.    Respiratory: Negative.    Cardiovascular: Negative.    Gastrointestinal: Negative.    Endocrine: Negative.    Genitourinary: Negative.    Musculoskeletal: Positive for joint swelling.   Skin: Positive for wound.   Neurological: Negative.    Hematological: Negative.    Psychiatric/Behavioral: Negative.        OBJECTIVE:     Vital Signs (Most Recent)  Pulse: 91 (12/05/19 1338)  BP: 123/74 (12/05/19 1338)  5' 5" (1.651 m)  104.3 kg (229 lb 15 oz)   Physical Exam:  Physical Exam   Constitutional: She is oriented to person, place, and time. She appears well-developed and well-nourished.   HENT:   Head: Normocephalic and atraumatic.   Eyes: Pupils are equal, round, and reactive to light. EOM are normal.   Neck: Neck supple.   Cardiovascular: Normal rate and regular rhythm.   Pulmonary/Chest: Effort normal.   Abdominal: Soft. She exhibits no distension. There is no tenderness. "   Musculoskeletal:   Decreased right ankle ROM due to pain   Neurological: She is alert and oriented to person, place, and time.   Skin: Skin is warm and dry.   Psychiatric: She has a normal mood and affect. Her behavior is normal.   Nursing note and vitals reviewed.    3x3cm open wound on dorsal aspect of right ankle with scattered patches of granulation tissue and exudate    Laboratory  Available labs reviewed.    Diagnostic Results:  Available imaging and diagnostic studies reviewed.    ASSESSMENT/PLAN:     34 y.o. female s/p WLE of T1a melanoma of right ankle    PLAN:  - Wound care consult  - Script for crutches  - We will call her with pathology results  - Continue PT  - RTC 1 month for wound check     Bong Hathaway MD  General Surgery PGYIII  [phone]  I have personally taken the history and examined this patient and agree with the resident's note as stated above.Pathology remains pending status post a 1 cm wide local excision of the T1 a melanoma of her right foot with right groin sentinel lymph node biopsy.  The patient's right groin site is healing appropriately without signs of infection hematoma or seroma.  She does not have a lymphedema of the right lower extremity but she has significant postoperative pain which is referred pain to her calf region described as a cramping, charley horse type discomfort.  She was evaluated in emergency room and duplex ultrasound was negative for DVT.  Will refer her to wound care as she has some fibrinous exudate over the wide local excision site on her right foot.  I will not prescribed Santyl as I would not want to have the topical collagenase make direct contact with the underlying fascia and tendons just deep to the wound on her right anterior ankle.  No signs of infection and no evidence of surrounding cellulitis.  Referred of wound care for topical wound care and therapy.  With no evidence of DVT and no signs of infection I feel that the pain and discomfort will be  self-limited and slowly resolve as her wound heals likely related to sensory cutaneous nerve irritation at primary wound site.  I will have her follow up with me in 4 weeks..  I will phone review her pathology results once the in epic.  She may fully weightbear but may ambulate with crutches if she so desires.

## 2019-12-05 NOTE — PLAN OF CARE
[facility]  Physical Therapy Initial Evaluation    Name: Katarina Munguia  Clinic Number: [id number]    Therapy Diagnosis:   Encounter Diagnoses   Name Primary?    Acute right ankle pain     Right calf pain     Gait, antalgic     Ankle weakness     Decreased range of motion of right ankle      Physician: Cj Ferrell MD    Physician Orders: PT Eval and Treat   Medical Diagnosis from Referral: Malignant melanoma of right lower extremity  Evaluation Date: 12/5/2019  Authorization Period Expiration:12/2/20  Plan of Care Expiration: 3/5/2020  Visit # / Visits authorized: 1/ 1    Time In: 7:00 am  Time Out: 8:00 am  Total Billable Time: 60 minutes    Precautions: Standard    Subjective   Date of surgery:  11/25/19  History of current condition - Katarina reports: Pt states she had surgery to remove lymph nodes of R side and removal of skin mole of dorsal R ankle. Pt states sine the surgery she has been having R calf pain and anterior dorsal pain. Pt states she has a lot of R ankle pain. Pt describe pain as tightness and painful. Pt states she cannot walk property. Pt reports she has some tingling. Aggravating factors: standing, walking, sitting down. Easing factors: lying flat.      Medical History:   Past Medical History:   Diagnosis Date    Allergy     Hypertension     Melanoma        Surgical History:   Katarina Munguia  has a past surgical history that includes Tonsillectomy; Foot surgery; [place] lymph node biopsy (Right, 11/25/2019); Injection for sentinel node identification (Right, 11/25/2019); and Esophagogastroduodenoscopy (N/A, 11/29/2019).    Medications:   Katarina has a current medication list which includes the following prescription(s): amlodipine, chlorthalidone, flu vacc [id number] 6mos up(pf), hydrocodone-acetaminophen, microgestin fe 1.5/30 (28), multivitamin, and pantoprazole.    Allergies:   Review of patient's allergies indicates:  No Known Allergies      Imaging, none:     Prior Therapy: No.   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     Posture Alignment: No major    Sensation: Light touch:intact to light touch LE    DTR: intact to light touch LE     GAIT DEVIATIONS: Katarina displays antalgic gait    Ankle Range of Motion:   Ankle Right Left   Dorsiflexion 15 30   Plantarflexion 20 45   Inversion 13 32   Eversion 15 15      Strength:   Right Ankle   Left Ankle    Dorsiflexion: 3/5 Dorsiflexion: 4+/5   Plantarflexion:  3/5 Plantarflexion: 4+/5   Inversion: 3/5 Inversion: 4+/5   Eversion: 3/5 Eversion: 4+/5       Right LE  Left LE    Hip flexion: 4+/5 Hip flexion: 4+/5   Hip extension:  4+/5 Hip extension: 4+/5   Hip abduction: 4+/5 Hip abduction: 4+/5   Hip adduction: 4+/5 Hip adduction 4+/5   Knee extension: 4+/5 Knee extension: 4+/5   Knee flexion: 4+/5 Knee flexion: 4+/5     Joint Mobility: decrease mobility     Palpation:         CMS Impairment/Limitation/Restriction for FOTO Lower Leg (w/o Knee) Survey  Status Limitation G-Code CMS Severity Modifier  Intake 28% 72% Current Status CL - At least 60 percent but less than 80 percent  Predicted 64% 36% Goal Status+ CJ - At least 20 percent but less than 40 percent  D/C Status CL **only report if this is a one time visit  TREATMENT   Treatment Time In: 7:50 am  Treatment Time Out: 8:00 am  Total Treatment time separate from Evaluation: 10 minutes    Katarina received therapeutic exercises to develop ROM for 10 minutes including:  Long sitting calf stretch   R ankle circles   R ankle pumps     Gait  training with 2 crutches     Home Exercises and Patient Education Provided    Education provided:   - perform HEP 2x per week    Written Home Exercises Provided: Patient instructed to cont prior HEP.  Exercises were reviewed and Katarina was able to demonstrate them prior to the end of the session.  Katarina demonstrated good  understanding of the education provided.     See EMR under Patient Instructions for exercises provided 12/5/2019.    Assessment   Katarina is a 34 y.o. female referred to outpatient Physical Therapy with a medical diagnosis of Malignant melanoma of right lower extremity. Pt presents to therapy after R dorsal ankle surgery. Pt had removal of malignant melanoma of R dorsal ankle. Pt ambulated with antalgic gait pattern without any crutches today. Pt demonstrated increase of R dorsal ankle pain during WB. Pt also demonstrated decrease of R ankle AROM, decrease R ankle muscle strength, and increase R calf muscle restriction and tightness. Pt's wound apears without any infection and clean. Pt was educated signs and symptoms of infection. Pt was instructed how to use crutches to decrease R ankle pain. PT would recommend pt to use crutches during ambulation at this stage of healing process to decrease ankle and calf pain. Pt will benefit from skilled PT services to decrease functional limitations and return to work.     Pt prognosis is Good.   Pt will benefit from skilled outpatient Physical Therapy to address the deficits stated above and in the chart below, provide pt/family education, and to maximize pt's level of independence.     Plan of care discussed with patient: Yes  Pt's spiritual, cultural and educational needs considered and patient is agreeable to the plan of care and goals as stated below:     Anticipated Barriers for therapy: Pain    Medical Necessity is demonstrated by the following  History  Co-morbidities and personal factors that may impact the plan of care Co-morbidities:   Not  identified    Personal Factors:   no deficits     Complexity: low   Examination  Body Structures and Functions, activity limitations and participation restrictions that may impact the plan of care Body Regions:   Ankle    Body Systems:    ROM  strength  balance  gait  transfers  transitions    Participation Restrictions:   Community ambulation and work     Activity limitations:   Learning and applying knowledge  no deficits    General Tasks and Commands  no deficits    Communication  no deficits    Mobility  walking    Self care  no deficits    Domestic Life  shopping  cooking  doing house work (cleaning house, washing dishes, laundry)    Interactions/Relationships  no deficits    Life Areas  no deficits    Community and Social Life  community life  recreation and leisure         Complexity: low  See FOTO outcome assessment   Clinical Presentation stable and uncomplicated low   Decision Making/ Complexity Score: low     Goals:  GOALS: Short Term Goals:  4 weeks  1.Report decreased  in  pain at worse less than  <   / =  5  /10  to increase tolerance for improved functional actvities  2. Pt to improve R ankle  range of motion by 10% to allow for improved functional mobility to allow for improvement in IADLs.   3. Increased R ankle  MMT 1/2 grade  to increase tolerance for ADL and work activities.  4. Pt to report R dorsal wound healed 50% to improve ambulation  5. Pt to tolerate HEP to improve ROM and independence with ADL's    Long Term Goals: 8 weeks  1.Report decreased  in  pain at worse  less than  <   / =  2/10  to increase tolerance for improved functional actvities  2.Pt to improve R ankle  range of motion by 30% to allow for improved functional mobility to allow for improvement in IADLs.   3.Increased R ankle  MMT 1 grade  to increase tolerance for ADL and work activities.  4. Pt will score At least 20 percent but less than 40 percent on  FOTO outcome assessment  to increase functional activities and  mobility   5. Pt to be Independent with HEP to improve ROM and independence with ADL's      Plan   Plan of care Certification: 12/5/2019 to 3/5/20.    Outpatient Physical Therapy 2 times weekly for 12 weeks to include the following interventions: Gait Training, Manual Therapy, Moist Heat/ Ice, Neuromuscular Re-ed, Patient Education, Therapeutic Activites and Therapeutic Exercise.       Pablo Roth, PT

## 2019-12-05 NOTE — LETTER
[facility] - Endo Surgery 2nd FL  [address]  [facility] [address]  Phone: [phone] December 7, 2019      Amita Howard MD  [address] [address]    Patient: Katarina Munguia   MR Number: [id number]   YOB: 1985   Date of Visit: 12/5/2019     Dear Dr. Howard:    Thank you for referring Katarina Munguia to me for evaluation. Below you will find relevant portions of my assessment and plan of care.    Pathology remains pending status post a 1 cm wide local excision of the T1 a melanoma of her right foot with right groin sentinel lymph node biopsy. The patient's right groin site is healing appropriately without signs of infection hematoma or seroma. She does not have a lymphedema of the right lower extremity but she has significant postoperative pain which is referred pain to her calf region described as a cramping, charley horse type discomfort. She was evaluated in emergency room and duplex ultrasound was negative for DVT.    Will refer her to wound care as she has some fibrinous exudate over the wide local excision site on her right foot. I will not prescribed Santyl as I would not want to have the topical collagenase make direct contact with the underlying fascia and tendons just deep to the wound on her right anterior ankle.    There are no signs of infection and no evidence of surrounding cellulitis. Referred for topical wound care and therapy. With no evidence of DVT and no signs of infection I feel that the pain and discomfort will be self-limited and slowly resolve as her wound heals likely related to sensory cutaneous nerve irritation at primary wound site.    I will have her follow up with me in 4 weeks. I will phone review her pathology results once the in EPIC. She may fully weight bear but may ambulate with crutches if she so desires.    If you have questions, please do not hesitate to call me. I look forward to following Katarina Munguia along with  you.    Sincerely,    Cj Ferrell MD  Medical Director, [facility]  Staff Attending Surgeon - Department of Surgery  [facility]  Associate Professor of Surgery  [employer]  [employer]    RLC/hcr

## 2019-12-07 ENCOUNTER — INITIAL CONSULT (OUTPATIENT)
Dept: WOUND CARE | Facility: CLINIC | Age: 34
End: 2019-12-07
Payer: COMMERCIAL

## 2019-12-07 VITALS
HEART RATE: 77 BPM | WEIGHT: 229.06 LBS | SYSTOLIC BLOOD PRESSURE: 133 MMHG | TEMPERATURE: 98 F | DIASTOLIC BLOOD PRESSURE: 88 MMHG | BODY MASS INDEX: 38.16 KG/M2 | HEIGHT: 65 IN

## 2019-12-07 DIAGNOSIS — T81.89XA DELAYED SURGICAL WOUND HEALING, INITIAL ENCOUNTER: ICD-10-CM

## 2019-12-07 PROCEDURE — 3008F BODY MASS INDEX DOCD: CPT | Mod: CPTII,S$GLB,, | Performed by: NURSE PRACTITIONER

## 2019-12-07 PROCEDURE — 99204 PR OFFICE/OUTPT VISIT, NEW, LEVL IV, 45-59 MIN: ICD-10-PCS | Mod: S$GLB,,, | Performed by: NURSE PRACTITIONER

## 2019-12-07 PROCEDURE — 3079F DIAST BP 80-89 MM HG: CPT | Mod: CPTII,S$GLB,, | Performed by: NURSE PRACTITIONER

## 2019-12-07 PROCEDURE — 99204 OFFICE O/P NEW MOD 45 MIN: CPT | Mod: S$GLB,,, | Performed by: NURSE PRACTITIONER

## 2019-12-07 PROCEDURE — 3075F SYST BP GE 130 - 139MM HG: CPT | Mod: CPTII,S$GLB,, | Performed by: NURSE PRACTITIONER

## 2019-12-07 PROCEDURE — 3075F PR MOST RECENT SYSTOLIC BLOOD PRESS GE 130-139MM HG: ICD-10-PCS | Mod: CPTII,S$GLB,, | Performed by: NURSE PRACTITIONER

## 2019-12-07 PROCEDURE — 3008F PR BODY MASS INDEX (BMI) DOCUMENTED: ICD-10-PCS | Mod: CPTII,S$GLB,, | Performed by: NURSE PRACTITIONER

## 2019-12-07 PROCEDURE — 3079F PR MOST RECENT DIASTOLIC BLOOD PRESSURE 80-89 MM HG: ICD-10-PCS | Mod: CPTII,S$GLB,, | Performed by: NURSE PRACTITIONER

## 2019-12-07 PROCEDURE — 99999 PR PBB SHADOW E&M-EST. PATIENT-LVL IV: ICD-10-PCS | Mod: PBBFAC,,, | Performed by: NURSE PRACTITIONER

## 2019-12-07 PROCEDURE — 99999 PR PBB SHADOW E&M-EST. PATIENT-LVL IV: CPT | Mod: PBBFAC,,, | Performed by: NURSE PRACTITIONER

## 2019-12-07 NOTE — PROGRESS NOTES
Subjective:       Patient ID: Katarina Munguia is a 34 y.o. female.    Chief Complaint: Wound Check    HPI   Adriane is a 34 year old female referred by Dr. Ferrell for evaluation and management of a surgical wound to the right lower extremity.  She is s/p wide local excision of a malignant melanoma to the right ankle on 11/26/19 with Dr. Ferrell.  She is keeping the wound clean and dry and covering with a gauze dressing. She is afebrile. She denies increased redness, swelling or purulent drainage.  Her pain level is 3/10.  Review of Systems   Constitutional: Negative for chills, diaphoresis and fever.   HENT: Positive for rhinorrhea and sore throat. Negative for hearing loss, postnasal drip, sinus pressure, sneezing, tinnitus and trouble swallowing.    Eyes: Negative for visual disturbance.   Respiratory: Positive for cough. Negative for apnea, shortness of breath and wheezing.    Cardiovascular: Negative for chest pain, palpitations and leg swelling.   Gastrointestinal: Negative for constipation, diarrhea, nausea and vomiting.   Genitourinary: Negative for difficulty urinating, dysuria, frequency and hematuria.   Musculoskeletal: Negative for arthralgias, back pain and joint swelling.   Skin: Positive for wound.   Neurological: Negative for dizziness, weakness, light-headedness and headaches.   Hematological: Does not bruise/bleed easily.   Psychiatric/Behavioral: Positive for sleep disturbance. Negative for confusion, decreased concentration and dysphoric mood. The patient is not nervous/anxious.        Objective:      Physical Exam   Constitutional: She is oriented to person, place, and time. She appears well-developed and well-nourished. No distress.   HENT:   Head: Normocephalic and atraumatic.   Mouth/Throat: Oropharynx is clear and moist.   Eyes: Pupils are equal, round, and reactive to light. Conjunctivae and EOM are normal. Right eye exhibits no discharge. Left eye exhibits no discharge. No scleral icterus.    Neck: Neck supple. No JVD present. No thyromegaly present.   Cardiovascular: Normal rate, regular rhythm, normal heart sounds and intact distal pulses. Exam reveals no gallop and no friction rub.   No murmur heard.  Pulmonary/Chest: Effort normal and breath sounds normal. No respiratory distress. She has no wheezes. She has no rales.   Abdominal: Soft. Bowel sounds are normal. She exhibits no distension. There is no tenderness.   Musculoskeletal: She exhibits edema (2+ right lower leg). She exhibits no tenderness.        Feet:    Neurological: She is alert and oriented to person, place, and time.   Skin: Skin is warm and dry. No rash noted. She is not diaphoretic. No erythema.   Psychiatric: She has a normal mood and affect. Her behavior is normal. Judgment and thought content normal.   Nursing note and vitals reviewed.            Assessment:       1. Delayed surgical wound healing, initial encounter               Plan:            Cleanse wound with dove soap and water.  Apply medihoney gel daily to leg wound, cover with adaptic and cotton gauze and secure with a mepore island dressing.  20-30 mmHg knee high compression hose right lower leg. Apply hose first thing in the morning and remove at bedtime. Do not sleep in the hose.  Return to clinic in one month.

## 2019-12-07 NOTE — LETTER
December 7, 2019      Cj Ferrell MD  [address]  [facility] [address]           [facility]  [address]  [facility] [address]  Phone: [phone]          Patient: Katarina Munguia   MR Number: [id number]   YOB: 1985   Date of Visit: 12/7/2019       Dear Dr. Cj Ferrell:    Thank you for referring Katarina Munguia to me for evaluation. Attached you will find relevant portions of my assessment and plan of care.    If you have questions, please do not hesitate to call me. I look forward to following Katarina Munguia along with you.    Sincerely,    Sera Amaya, NP    Enclosure  CC:  No Recipients    If you would like to receive this communication electronically, please contact [email or web address] or [phone] to request more information on [employer] Link access.    For providers and/or their staff who would like to refer a patient to [facility], please contact us through our one-stop-shop provider referral line, [facility], at [phone].    If you feel you have received this communication in error or would no longer like to receive these types of communications, please e-mail [email or web address]

## 2019-12-07 NOTE — PATIENT INSTRUCTIONS
Wound Care  Taking proper care of your wound will help it heal. Your healthcare provider may show you how to clean and dress the wound. He or she will also explain how to tell if the wound is healing normally. Here are the basic steps:      A wound that's not healing normally may be dark in color or have white streaks.    Wash Your Hands  · Use liquid soap and lather for 2 minutes. Scrub between your fingers and under your nails.  · Rinse with warm water, keeping your fingers pointing down.  · Use a paper towel to dry your hands and to turn off the faucet.  Remove the Used Dressing  · Set up your supplies.  · Put on disposable gloves if youre dressing a wound for someone else or your wound is infected.  · Gently take off the old dressing. If you have a drain or tube in the wound, be careful not to pull on it.  · Loosen the tape by pulling gently toward the wound.  · Remove the dressing one layer at a time and put it in a plastic bag.  · Remove your gloves.  Inspect and Dress the Wound  · Each time you change the dressing, inspect the wound carefully to be sure its healing normally.  · Wash your hands again. Put on a new pair of gloves if youre dressing a wound for someone else or if your wound is infected.  · Clean and dress the wound as directed by your doctor or nurse. If you have a drain or tube, be careful not to pull on it.  · Put all supplies in a plastic bag; seal the bag and put it in the trash.  · Be sure to wash your hands again.  Call your healthcare provider if you see any of the following signs of a problem:   · Bleeding that soaks the dressing  · Pink fluid weeping from the wound  · Increased drainage or drainage that is yellow, yellow-green, or foul-smelling  · Increased swelling or pain, or redness or swelling in the skin around the wound  · A change in the color of the wound  · An increase in the size of the wound  · A fever over 101.0°F, increased fatigue, or a loss of appetite.       ©  5946-3666 [facility], [address] [place] [address], [place] [address]. All rights reserved. This information is not intended as a substitute for professional medical care. Always follow your healthcare professional's instructions.      You may shower using a mild soap such as Dove.  Irrigate the wound with lukewarm water for 5 minutes and dry thoroughly.  Apply medihoney gel to the wound, cover with vaseline gauze and cotton gauze and secure with paper tape or an island dressing.  Apply compression hose first thing in the morning and remove at bedtime. Do not sleep in the hose.  Gently lift the stocking over the wound when putting on the stocking to avoid it dragging across the wound bed.  Change dressing daily.  Report any signs of infection.    You may purchase your supplies including the medihoney gel and 20-30 mmHg knee high compression hose from [employer].  They have 3 locations:  [facility] [place] [address] [phone] [address] [address] [phone] [address] [place] [address] [phone]    You can purchase popsicle sticks from a [employer] store and use this to spread the medihoney gel over the wound.

## 2019-12-09 LAB
FINAL PATHOLOGIC DIAGNOSIS: NORMAL
GROSS: NORMAL

## 2019-12-11 NOTE — TELEPHONE ENCOUNTER
She does not need labs since she just had them in November.  Hepatology may order additional labs when they see her in January.

## 2019-12-12 ENCOUNTER — TELEPHONE (OUTPATIENT)
Dept: GASTROENTEROLOGY | Facility: HOSPITAL | Age: 34
End: 2019-12-12

## 2019-12-12 ENCOUNTER — CLINICAL SUPPORT (OUTPATIENT)
Dept: REHABILITATION | Facility: HOSPITAL | Age: 34
End: 2019-12-12
Attending: SURGERY
Payer: COMMERCIAL

## 2019-12-12 DIAGNOSIS — R26.89 GAIT, ANTALGIC: ICD-10-CM

## 2019-12-12 DIAGNOSIS — M79.661 RIGHT CALF PAIN: ICD-10-CM

## 2019-12-12 DIAGNOSIS — M25.671 DECREASED RANGE OF MOTION OF RIGHT ANKLE: ICD-10-CM

## 2019-12-12 DIAGNOSIS — M25.571 ACUTE RIGHT ANKLE PAIN: ICD-10-CM

## 2019-12-12 DIAGNOSIS — R29.898 ANKLE WEAKNESS: ICD-10-CM

## 2019-12-12 PROCEDURE — 97110 THERAPEUTIC EXERCISES: CPT | Mod: PN

## 2019-12-12 NOTE — PROGRESS NOTES
Physical Therapy Daily Treatment Note     Name: Katarina Munguia  Clinic Number: [id number]    Therapy Diagnosis:   Encounter Diagnoses   Name Primary?    Acute right ankle pain     Right calf pain     Gait, antalgic     Ankle weakness     Decreased range of motion of right ankle      Physician: Cj Ferrell MD    Visit Date: 12/12/2019    Physician Orders: PT Eval and Treat   Medical Diagnosis from Referral: Malignant melanoma of right lower extremity  Evaluation Date: 12/5/2019  Authorization Period Expiration:12/31/2019  Plan of Care Expiration: 3/5/2020  Visit # / Visits authorized: 2/ 20 PN Due: 1/5/2020     Time In: 9:30 am  Time Out: 10:20 am  Total Billable Time: 30 minutes    Precautions: Standard    Subjective     Pt reports: that she went to wound care consultant. She states everything is healing fine.   She was compliant with home exercise program.  Response to previous treatment: increase pain  Functional change: able to ambulate with crutches     Pain: 3/10  Location: right ankles     Objective     Katarina received therapeutic exercises to develop strength, endurance, ROM and flexibility for 45 minutes including:  Nustep 6 min  Long sitting calf stretch 5x30 sec  R ankle circles 1x2 min  R ankle pumps  3x10  R anklle DF YTB 3x10  R ankle PF YTB 3x10   Marbles  1 trials   Stevenson 1x2 min    Katarina received the following manual therapy techniques: Soft tissue Mobilization were applied to the: N/a for 00 minutes, including:      Home Exercises and Patient Education Provided     Education provided:   - perform HEP 2x per week     Written Home Exercises Provided: Patient instructed to cont prior HEP.  Exercises were reviewed and Katarina was able to demonstrate them prior to the end of the session.  Katarina demonstrated good  understanding of the education provided.      See EMR under Patient Instructions for exercises provided 12/5/2019.       Assessment     Pt tolerated initial therapy well. Pt  ambulated with crutches that was not appropriated for her size. Pt demonstrated increase ot R anterior ankle pain during ankle DF, PF, marbles  and miroslava. Pt demonstrated increase of R ankle pain during seated position for long period of time. Pt cont with weakness of R ankle and decrease AROM. Intrinsic muscles are also weak. Wound looks without any infection. Plan to cont skilled PT services to decrease pain.     Katarina is progressing well towards her goals.   Pt prognosis is Good.     Pt will continue to benefit from skilled outpatient physical therapy to address the deficits listed in the problem list box on initial evaluation, provide pt/family education and to maximize pt's level of independence in the home and community environment.     Pt's spiritual, cultural and educational needs considered and pt agreeable to plan of care and goals.    Anticipated barriers to physical therapy: Pain    Goals:  GOALS: Short Term Goals:  4 weeks  1.Report decreased  in  pain at worse less than  <   / =  5  /10  to increase tolerance for improved functional actvities  2. Pt to improve R ankle  range of motion by 10% to allow for improved functional mobility to allow for improvement in IADLs.   3. Increased R ankle  MMT 1/2 grade  to increase tolerance for ADL and work activities.  4. Pt to report R dorsal wound healed 50% to improve ambulation  5. Pt to tolerate HEP to improve ROM and independence with ADL's     Long Term Goals: 8 weeks  1.Report decreased  in  pain at worse  less than  <   / =  2/10  to increase tolerance for improved functional actvities  2.Pt to improve R ankle  range of motion by 30% to allow for improved functional mobility to allow for improvement in IADLs.   3.Increased R ankle  MMT 1 grade  to increase tolerance for ADL and work activities.  4. Pt will score At least 20 percent but less than 40 percent on  FOTO outcome assessment  to increase functional activities and mobility   5. Pt to be  Independent with HEP to improve ROM and independence with ADL's       Plan     Plan of care Certification: 12/5/2019 to 3/5/20.    Cont skilled PT session towards PT and patient's goals.    Pablo Roth, PT   12/12/2019

## 2019-12-16 ENCOUNTER — PATIENT MESSAGE (OUTPATIENT)
Dept: WOUND CARE | Facility: CLINIC | Age: 34
End: 2019-12-16

## 2019-12-16 LAB
FINAL PATHOLOGIC DIAGNOSIS: NORMAL
GROSS: NORMAL

## 2019-12-17 ENCOUNTER — CLINICAL SUPPORT (OUTPATIENT)
Dept: REHABILITATION | Facility: HOSPITAL | Age: 34
End: 2019-12-17
Attending: SURGERY
Payer: COMMERCIAL

## 2019-12-17 DIAGNOSIS — R26.89 GAIT, ANTALGIC: ICD-10-CM

## 2019-12-17 DIAGNOSIS — M25.671 DECREASED RANGE OF MOTION OF RIGHT ANKLE: ICD-10-CM

## 2019-12-17 DIAGNOSIS — M79.661 RIGHT CALF PAIN: ICD-10-CM

## 2019-12-17 DIAGNOSIS — R29.898 ANKLE WEAKNESS: ICD-10-CM

## 2019-12-17 DIAGNOSIS — M25.571 ACUTE RIGHT ANKLE PAIN: ICD-10-CM

## 2019-12-17 PROCEDURE — 97110 THERAPEUTIC EXERCISES: CPT | Mod: PN

## 2019-12-17 NOTE — PROGRESS NOTES
Physical Therapy Daily Treatment Note     Name: Katarina Munguia  Clinic Number: [id number]    Therapy Diagnosis:   Encounter Diagnoses   Name Primary?    Acute right ankle pain     Right calf pain     Gait, antalgic     Ankle weakness     Decreased range of motion of right ankle      Physician: Cj Ferrell MD    Visit Date: 12/17/2019    Physician Orders: PT Eval and Treat   Medical Diagnosis from Referral: Malignant melanoma of right lower extremity  Evaluation Date: 12/5/2019  Authorization Period Expiration:12/31/2019  Plan of Care Expiration: 3/5/2020  Visit # / Visits authorized: 3/ 20 PN Due: 1/5/2020     Time In: 8:50 am  Time Out: 19:50 am  Total Billable Time: 40 minutes    Precautions: Standard    Subjective     Pt reports: that she is feeling better. Pt states she has been ambulating without crutches.  She was compliant with home exercise program.  Response to previous treatment: increase pain  Functional change: able to ambulate with crutches     Pain: 1/10  Location: right ankles     Objective     Observation: Less antalgic gait pattern.     Katarina received therapeutic exercises to develop strength, endurance, ROM and flexibility for 45 minutes including:  Upright bike 6 min   Long sitting calf stretch 5x30 sec  R ankle circles 1x2 min  R ankle pumps  3x10  R anklle DF RTB 3x10  R ankle PF RTB 3x10  R ankle Eversion RTB 3x10   R ankle inversion 3x10   Marbles  1 trials   Juan [place] 1x2 min    Katarina received the following manual therapy techniques: Soft tissue Mobilization were applied to the: N/a for 00 minutes, including:      Home Exercises and Patient Education Provided     Education provided:   - perform HEP 2x per week     Written Home Exercises Provided: Patient instructed to cont prior HEP.  Exercises were reviewed and Katarina was able to demonstrate them prior to the end of the session.  Katarina demonstrated good  understanding of the education provided.      See EMR under Patient  Instructions for exercises provided 12/5/2019.       Assessment     Pt tolerated therapy well. Pt ambulated with less antalgic gait pattern and Minor R anterior ankle pain. Pt was able to tolerated progression of strength and AROM exercises today. Pt demonstrated improvement in R intrinsic foot muscle strength. She tolerated addition of R  Ankle eversion and inversion with RTB. Pt demonstrated increase of R anterior ankle pain. Wound cont to heal well without any infection. Plan to cont skilled PT services to decrease pain.     Katarina is progressing well towards her goals.   Pt prognosis is Good.     Pt will continue to benefit from skilled outpatient physical therapy to address the deficits listed in the problem list box on initial evaluation, provide pt/family education and to maximize pt's level of independence in the home and community environment.     Pt's spiritual, cultural and educational needs considered and pt agreeable to plan of care and goals.    Anticipated barriers to physical therapy: Pain    Goals:  GOALS: Short Term Goals:  4 weeks  1.Report decreased  in  pain at worse less than  <   / =  5  /10  to increase tolerance for improved functional actvities  2. Pt to improve R ankle  range of motion by 10% to allow for improved functional mobility to allow for improvement in IADLs.   3. Increased R ankle  MMT 1/2 grade  to increase tolerance for ADL and work activities.  4. Pt to report R dorsal wound healed 50% to improve ambulation  5. Pt to tolerate HEP to improve ROM and independence with ADL's     Long Term Goals: 8 weeks  1.Report decreased  in  pain at worse  less than  <   / =  2/10  to increase tolerance for improved functional actvities  2.Pt to improve R ankle  range of motion by 30% to allow for improved functional mobility to allow for improvement in IADLs.   3.Increased R ankle  MMT 1 grade  to increase tolerance for ADL and work activities.  4. Pt will score At least 20 percent but less  than 40 percent on  FOTO outcome assessment  to increase functional activities and mobility   5. Pt to be Independent with HEP to improve ROM and independence with ADL's       Plan     Plan of care Certification: 12/5/2019 to 3/5/20.    Cont skilled PT session towards PT and patient's goals.    Pablo Roth, PT   12/17/2019

## 2019-12-18 ENCOUNTER — PATIENT MESSAGE (OUTPATIENT)
Dept: WOUND CARE | Facility: CLINIC | Age: 34
End: 2019-12-18

## 2019-12-23 DIAGNOSIS — I10 ESSENTIAL HYPERTENSION: ICD-10-CM

## 2019-12-23 RX ORDER — CHLORTHALIDONE 25 MG/1
25 TABLET ORAL DAILY
Qty: 30 TABLET | Refills: 0 | Status: SHIPPED | OUTPATIENT
Start: 2019-12-23 | End: 2020-02-26 | Stop reason: SDUPTHER

## 2019-12-23 RX ORDER — AMLODIPINE BESYLATE 5 MG/1
5 TABLET ORAL DAILY
Qty: 30 TABLET | Refills: 0 | Status: SHIPPED | OUTPATIENT
Start: 2019-12-23 | End: 2020-09-27 | Stop reason: SDUPTHER

## 2020-01-09 ENCOUNTER — OFFICE VISIT (OUTPATIENT)
Dept: SURGERY | Facility: CLINIC | Age: 35
End: 2020-01-09
Payer: COMMERCIAL

## 2020-01-09 VITALS
BODY MASS INDEX: 39.37 KG/M2 | HEART RATE: 73 BPM | HEIGHT: 65 IN | DIASTOLIC BLOOD PRESSURE: 70 MMHG | WEIGHT: 236.31 LBS | SYSTOLIC BLOOD PRESSURE: 144 MMHG

## 2020-01-09 DIAGNOSIS — C43.71 MALIGNANT MELANOMA OF RIGHT LOWER EXTREMITY: Primary | ICD-10-CM

## 2020-01-09 PROCEDURE — 99999 PR PBB SHADOW E&M-EST. PATIENT-LVL III: CPT | Mod: PBBFAC,,, | Performed by: SURGERY

## 2020-01-09 PROCEDURE — 99024 PR POST-OP FOLLOW-UP VISIT: ICD-10-PCS | Mod: S$GLB,,, | Performed by: SURGERY

## 2020-01-09 PROCEDURE — 99999 PR PBB SHADOW E&M-EST. PATIENT-LVL III: ICD-10-PCS | Mod: PBBFAC,,, | Performed by: SURGERY

## 2020-01-09 PROCEDURE — 99024 POSTOP FOLLOW-UP VISIT: CPT | Mod: S$GLB,,, | Performed by: SURGERY

## 2020-01-09 NOTE — PROGRESS NOTES
General Surgery Clinic  Progress Note    SUBJECTIVE:     Chief Complaint   Patient presents with    Post-op Evaluation     History of Present Illness:  Katarina Munguia is a 34 y.o. female s/p WLE of a right ankle melanoma on 11/25/19 who returns today for her initial post-op visit. Initially lesion was a T1a on shave biopsy with a depth of least 0.65cm.   She had a lot of pain especially with ankle flexion on her last visit. Was seen in the ED for calf pain and work-up was negative for a DVT. She is able to walk but not long distances due to pain. Pathology is still pending.        Review of patient's allergies indicates:  No Known Allergies    Current Outpatient Medications   Medication Sig Dispense Refill    amLODIPine (NORVASC) 5 MG tablet Take 1 tablet (5 mg total) by mouth once daily. 30 tablet 0    chlorthalidone (HYGROTEN) 25 MG Tab Take 1 tablet (25 mg total) by mouth once daily. 30 tablet 0    flu vacc [id number] 6mos up,PF, 60 mcg (15 mcg x 4)/0.5 mL Syrg inject as directed 0.5 mL 0    MICROGESTIN FE 1.5/30, 28, 1.5 mg-30 mcg (21)/75 mg (7) tablet Take 1 tablet by mouth once daily.       multivitamin capsule Take 1 capsule by mouth once daily.      pantoprazole (PROTONIX) 40 MG tablet Take 1 tablet (40 mg total) by mouth once daily. 90 tablet 1     No current facility-administered medications for this visit.        Past Medical History:   Diagnosis Date    Allergy     Hypertension     Melanoma      Past Surgical History:   Procedure Laterality Date    ESOPHAGOGASTRODUODENOSCOPY N/A 11/29/2019    Procedure: ESOPHAGOGASTRODUODENOSCOPY (EGD);  Surgeon: Katty Garcia MD;  Location: [facility];  Service: Endoscopy;  Laterality: N/A;    FOOT SURGERY      INJECTION FOR SENTINEL NODE IDENTIFICATION Right 11/25/2019    Procedure: INJECTION, FOR SENTINEL NODE IDENTIFICATION-Right groin;  Surgeon: Cj Ferrell MD;  Location: [facility] OR [address] [place] [address];  Service: General;  Laterality: Right;    SENTINEL  "LYMPH NODE BIOPSY Right 11/25/2019    Procedure: BIOPSY, LYMPH NODE, SENTINEL-Right groin;  Surgeon: Cj Ferrell MD;  Location: [facility] OR [address] [place] [address];  Service: General;  Laterality: Right;    TONSILLECTOMY       Family History   Problem Relation Age of Onset    Hypertension Mother     Hyperlipidemia Mother     Transient ischemic attack Mother     Cancer Father     Lung cancer Father     Heart disease Maternal Grandfather     Heart disease Sister      Social History     Tobacco Use    Smoking status: Never Smoker    Smokeless tobacco: Never Used   Substance Use Topics    Alcohol use: Yes     Alcohol/week: 1.0 standard drinks     Types: 1 Cans of beer per week     Frequency: 2-4 times a month     Drinks per session: 1 or 2     Binge frequency: Never     Comment: occasional    Drug use: No        Review of Systems:  Review of Systems   Constitutional: Negative.    HENT: Negative.    Eyes: Negative.    Respiratory: Negative.    Cardiovascular: Negative.    Gastrointestinal: Negative.    Endocrine: Negative.    Genitourinary: Negative.    Musculoskeletal: Positive for joint swelling.   Skin: Positive for wound.   Neurological: Negative.    Hematological: Negative.    Psychiatric/Behavioral: Negative.        OBJECTIVE:     Vital Signs (Most Recent)  Pulse: 73 (01/09/20 1327)  BP: (!) 144/70 (01/09/20 1327)  5' 5" (1.651 m)  107.2 kg (236 lb 5.3 oz)   Physical Exam:  Physical Exam   Constitutional: She is oriented to person, place, and time. She appears well-developed and well-nourished.   HENT:   Head: Normocephalic and atraumatic.   Eyes: Pupils are equal, round, and reactive to light. EOM are normal.   Neck: Neck supple.   Cardiovascular: Normal rate and regular rhythm.   Pulmonary/Chest: Effort normal.   Abdominal: Soft. She exhibits no distension. There is no tenderness.   Musculoskeletal:   Decreased right ankle ROM due to pain   Neurological: She is alert and oriented to person, place, and time. "   Skin: Skin is warm and dry.   Psychiatric: She has a normal mood and affect. Her behavior is normal.   Nursing note and vitals reviewed.    3x3cm open wound on dorsal aspect of right ankle with scattered patches of granulation tissue and exudate    Laboratory  Available labs reviewed.    Diagnostic Results:  Available imaging and diagnostic studies reviewed.    ASSESSMENT/PLAN:     34 y.o. female s/p WLE of T1a melanoma of right ankle    PLAN:  - Wound care consult  - Script for crutches  - We will call her with pathology results  - Continue PT  - RTC 1 month for wound check     Roxane Mccauley  MS4  The above assessment and plan was discussed with the rest of the team on round. Please refer to resident/attending notes for definitive plans and dispositions.

## 2020-01-09 NOTE — LETTER
[facility] - Melanoma Surgery  [address]  [facility] [address]  Phone: [phone] January 11, 2020      Amita Howard MD  [address] [address]    Patient: Katarina Munguia   MR Number: [id number]   YOB: 1985   Date of Visit: 1/9/2020     Dear Dr. Howard:    Thank you for referring Katarina Munguia to me for evaluation. Below you will find relevant portions of my assessment and plan of care.    Ms. Munguia is a 34-year-old female who is status post wide local excision of T1a melanoma of right ankle. Patient was called with pathology results and a copy was provided to her today and discussed with her.     No residual melanoma and negative right groin SLNB.  Therefore, she has a 0.65 mm Breslow depth thin T1aN0, stage IA melanoma.  Melanoma was completely resected.  Will not require any additional adjuvant therapy. Wound dressing change performed in clinic today.    PLAN:    - Continue PT  - F/U prn with me  - Continue wound care   - F/U with dermatology for routine whole body skin screening and surveillance exams.    If you have questions, please do not hesitate to call me. I look forward to following Katarina Munguia along with you.    Sincerely,    Cj Ferrell MD  Medical Director, [facility]  Staff Attending Surgeon - Department of Surgery  [facility]  Associate Professor of Surgery  [employer]  [facility] [employer]    RLC/hcr    CC  MD Sera Phelps NP

## 2020-01-11 PROBLEM — C43.71 MALIGNANT MELANOMA OF RIGHT LOWER EXTREMITY: Status: ACTIVE | Noted: 2019-11-25

## 2020-01-11 NOTE — PROGRESS NOTES
SUBJECTIVE:          Chief Complaint   Patient presents with    Post-op Evaluation      History of Present Illness:  Katarina Munguia is a 34 y.o. female s/p WLE of a right ankle melanoma on 11/25/19 who returns today for her second post-op visit. Initially lesion was a T1a on shave biopsy with a depth of least 0.65cm.   She is doing much better with no subjective complaints today.  Pathology revealed no residual melanoma with WLE and negative SLNB so therefore she has a T1aN0 stage IA melanoma and will not require any further therapy.  She continues wound care through wound care specialist and her  as the R dorsal ankle wound continues to granulate and heal by secondary intention.    Review of patient's allergies indicates:  No Known Allergies     Current Medications          Current Outpatient Medications   Medication Sig Dispense Refill    amLODIPine (NORVASC) 5 MG tablet Take 1 tablet (5 mg total) by mouth once daily. 30 tablet 3    chlorthalidone (HYGROTEN) 25 MG Tab Take 1 tablet (25 mg total) by mouth once daily. 30 tablet 3    flu vacc [id number] 6mos up,PF, 60 mcg (15 mcg x 4)/0.5 mL Syrg inject as directed 0.5 mL 0    HYDROcodone-acetaminophen (NORCO) 5-325 mg per tablet Take 1 tablet by mouth every 6 (six) hours as needed for Pain. 25 tablet 0    MICROGESTIN FE 1.5/30, 28, 1.5 mg-30 mcg (21)/75 mg (7) tablet Take 1 tablet by mouth once daily.         multivitamin capsule Take 1 capsule by mouth once daily.        pantoprazole (PROTONIX) 40 MG tablet Take 1 tablet (40 mg total) by mouth once daily. 90 tablet 1      No current facility-administered medications for this visit.                  Past Medical History:   Diagnosis Date    Allergy      Hypertension      Melanoma              Past Surgical History:   Procedure Laterality Date    ESOPHAGOGASTRODUODENOSCOPY N/A 11/29/2019     Procedure: ESOPHAGOGASTRODUODENOSCOPY (EGD);  Surgeon: Katty Garcia MD;  Location: [facility];  Service:  "Endoscopy;  Laterality: N/A;    FOOT SURGERY        INJECTION FOR SENTINEL NODE IDENTIFICATION Right 11/25/2019     Procedure: INJECTION, FOR SENTINEL NODE IDENTIFICATION-Right groin;  Surgeon: Cj Ferrell MD;  Location: [address];  Service: General;  Laterality: Right;    SENTINEL LYMPH NODE BIOPSY Right 11/25/2019     Procedure: BIOPSY, LYMPH NODE, SENTINEL-Right groin;  Surgeon: Cj Ferrell MD;  Location: [address];  Service: General;  Laterality: Right;    TONSILLECTOMY                Family History   Problem Relation Age of Onset    Hypertension Mother      Hyperlipidemia Mother      Transient ischemic attack Mother      Cancer Father      Lung cancer Father      Heart disease Maternal Grandfather      Heart disease Sister        Social History            Tobacco Use    Smoking status: Never Smoker    Smokeless tobacco: Never Used   Substance Use Topics    Alcohol use: Yes       Alcohol/week: 1.0 standard drinks       Types: 1 Cans of beer per week       Frequency: 2-4 times a month       Drinks per session: 1 or 2       Binge frequency: Never       Comment: occasional    Drug use: No         Review of Systems:  Review of Systems   Constitutional: Negative.    HENT: Negative.    Eyes: Negative.    Respiratory: Negative.    Cardiovascular: Negative.    Gastrointestinal: Negative.    Endocrine: Negative.    Genitourinary: Negative.    Musculoskeletal: Positive for joint swelling.   Skin: Positive for wound.   Neurological: Negative.    Hematological: Negative.    Psychiatric/Behavioral: Negative.          OBJECTIVE:      Vital Signs (Most Recent)  Pulse: 91 (12/05/19 1338)  BP: 123/74 (12/05/19 1338)  5' 5" (1.651 m)  104.3 kg (229 lb 15 oz)   Physical Exam:  Physical Exam   Constitutional: She is oriented to person, place, and time. She appears well-developed and well-nourished.   HENT:   Head: Normocephalic and atraumatic.   Eyes: Pupils are equal, round, and reactive to " light. EOM are normal.   Neck: Neck supple.   Cardiovascular: Normal rate and regular rhythm.   Pulmonary/Chest: Effort normal.   Abdominal: Soft. She exhibits no distension. There is no tenderness.   Musculoskeletal:   Open wound along proximal dorsal R foot/anle continue to granulate and heal by secondary intention.  Wound size now 1.5 x 3 cm with peripheral re-epithelialization and no cellulitis.  No lymphedema of the RLE. R groin SLNB site also healing well withour signs of infection, seroma, or separation.  Neurological: She is alert and oriented to person, place, and time.   Skin: Skin is warm and dry.   Psychiatric: She has a normal mood and affect. Her behavior is normal.   Nursing note and vitals reviewed.     Laboratory  Available labs reviewed.     Diagnostic Results:  Available imaging and diagnostic studies reviewed.     ASSESSMENT/PLAN:      34 y.o. female s/p WLE of T1a melanoma of right ankle     PLAN:  Pt called with path results and a copy was provided to her today and discussed with her.    No residual melanoma and negative R groin SLNB.    Therefore, she has a 0.65 mm Breslow depth thin T1aN0, stage IA melanoma.    Completely resected.  Will not require any additional adjuvant therapy. Wound dressing change performed in clinic today.  - Continue PT  - F/U prn with me  - Continue wound care   - F/U with dermatology for routine whole body skin screening and surveillance exams.

## 2020-01-15 ENCOUNTER — PATIENT OUTREACH (OUTPATIENT)
Dept: ADMINISTRATIVE | Facility: OTHER | Age: 35
End: 2020-01-15

## 2020-01-17 ENCOUNTER — OFFICE VISIT (OUTPATIENT)
Dept: WOUND CARE | Facility: CLINIC | Age: 35
End: 2020-01-17
Payer: COMMERCIAL

## 2020-01-17 VITALS
SYSTOLIC BLOOD PRESSURE: 133 MMHG | HEART RATE: 77 BPM | BODY MASS INDEX: 39.56 KG/M2 | HEIGHT: 65 IN | TEMPERATURE: 98 F | WEIGHT: 237.44 LBS | DIASTOLIC BLOOD PRESSURE: 81 MMHG

## 2020-01-17 DIAGNOSIS — T81.89XA DELAYED SURGICAL WOUND HEALING, INITIAL ENCOUNTER: Primary | ICD-10-CM

## 2020-01-17 DIAGNOSIS — L92.9 ABNORMAL GRANULATION TISSUE: ICD-10-CM

## 2020-01-17 PROCEDURE — 99213 OFFICE O/P EST LOW 20 MIN: CPT | Mod: 25,S$GLB,, | Performed by: NURSE PRACTITIONER

## 2020-01-17 PROCEDURE — 3075F SYST BP GE 130 - 139MM HG: CPT | Mod: CPTII,S$GLB,, | Performed by: NURSE PRACTITIONER

## 2020-01-17 PROCEDURE — 3075F PR MOST RECENT SYSTOLIC BLOOD PRESS GE 130-139MM HG: ICD-10-PCS | Mod: CPTII,S$GLB,, | Performed by: NURSE PRACTITIONER

## 2020-01-17 PROCEDURE — 99213 PR OFFICE/OUTPT VISIT, EST, LEVL III, 20-29 MIN: ICD-10-PCS | Mod: 25,S$GLB,, | Performed by: NURSE PRACTITIONER

## 2020-01-17 PROCEDURE — 3008F PR BODY MASS INDEX (BMI) DOCUMENTED: ICD-10-PCS | Mod: CPTII,S$GLB,, | Performed by: NURSE PRACTITIONER

## 2020-01-17 PROCEDURE — 99999 PR PBB SHADOW E&M-EST. PATIENT-LVL IV: ICD-10-PCS | Mod: PBBFAC,,, | Performed by: NURSE PRACTITIONER

## 2020-01-17 PROCEDURE — 3079F PR MOST RECENT DIASTOLIC BLOOD PRESSURE 80-89 MM HG: ICD-10-PCS | Mod: CPTII,S$GLB,, | Performed by: NURSE PRACTITIONER

## 2020-01-17 PROCEDURE — 17250 CHEM CAUT OF GRANLTJ TISSUE: CPT | Mod: S$GLB,,, | Performed by: NURSE PRACTITIONER

## 2020-01-17 PROCEDURE — 99999 PR PBB SHADOW E&M-EST. PATIENT-LVL IV: CPT | Mod: PBBFAC,,, | Performed by: NURSE PRACTITIONER

## 2020-01-17 PROCEDURE — 17250 PR CHEM CAUTERY GRANULATN TISSUE: ICD-10-PCS | Mod: S$GLB,,, | Performed by: NURSE PRACTITIONER

## 2020-01-17 PROCEDURE — 3079F DIAST BP 80-89 MM HG: CPT | Mod: CPTII,S$GLB,, | Performed by: NURSE PRACTITIONER

## 2020-01-17 PROCEDURE — 3008F BODY MASS INDEX DOCD: CPT | Mod: CPTII,S$GLB,, | Performed by: NURSE PRACTITIONER

## 2020-01-17 NOTE — LETTER
January 17, 2020      Cj Ferrell MD  [address] [facility]  [facility] [address]           [facility]  [address] [facility] [address]  [facility] [address]  Phone: [phone]          Patient: Katarina Munguia   MR Number: [id number]   YOB: 1985   Date of Visit: 1/17/2020       Dear Dr. Cj Ferrell:    Thank you for referring Katarina Munguia to me for evaluation. Attached you will find relevant portions of my assessment and plan of care.    If you have questions, please do not hesitate to call me. I look forward to following Katarina Munugia along with you.    Sincerely,    Sera Amaya, NP    Enclosure  CC:  No Recipients    If you would like to receive this communication electronically, please contact [email or web address] or [phone] to request more information on [employer] Link access.    For providers and/or their staff who would like to refer a patient to [facility], please contact us through our one-stop-shop provider referral line, [facility], at [phone].    If you feel you have received this communication in error or would no longer like to receive these types of communications, please e-mail [email or web address]

## 2020-01-17 NOTE — PATIENT INSTRUCTIONS
Keep the wound dry until the gray tissue turns red again. Cleanse wound with dove soap and water.  Apply medihoney gel daily to leg wound, cover with adaptic and cotton gauze and secure with a mepore island dressing.  20-30 mmHg knee high compression hose right lower leg. Apply hose first thing in the morning and remove at bedtime. Do not sleep in the hose.

## 2020-01-17 NOTE — PROGRESS NOTES
Subjective:       Patient ID: Katarina Munguia is a 34 y.o. female.    Chief Complaint: Wound Check    HPI     Adriane is a 34 year old female referred by Dr. Ferrell for evaluation and management of a surgical wound to the right lower extremity.  She is s/p wide local excision of a malignant melanoma to the right ankle on 11/26/19 with Dr. Ferrell.  She is using medihoney gel daily on the wound and wearing compression hose during the day. The wound is healing as evidenced by wound contracture She is afebrile. She denies increased redness, swelling or purulent drainage.  She does not have any pain.  Review of Systems   Constitutional: Negative for chills, diaphoresis and fever.   HENT: Positive for rhinorrhea and sore throat. Negative for hearing loss, postnasal drip, sinus pressure, sneezing, tinnitus and trouble swallowing.    Eyes: Negative for visual disturbance.   Respiratory: Positive for cough. Negative for apnea, shortness of breath and wheezing.    Cardiovascular: Negative for chest pain, palpitations and leg swelling.   Gastrointestinal: Negative for constipation, diarrhea, nausea and vomiting.   Genitourinary: Negative for difficulty urinating, dysuria, frequency and hematuria.   Musculoskeletal: Negative for arthralgias, back pain and joint swelling.   Skin: Positive for wound.   Neurological: Negative for dizziness, weakness, light-headedness and headaches.   Hematological: Does not bruise/bleed easily.   Psychiatric/Behavioral: Positive for sleep disturbance. Negative for confusion, decreased concentration and dysphoric mood. The patient is not nervous/anxious.        Objective:      Physical Exam   Constitutional: She is oriented to person, place, and time. She appears well-developed and well-nourished. No distress.   HENT:   Head: Normocephalic and atraumatic.   Neck: No JVD present.   Cardiovascular: Intact distal pulses.   Musculoskeletal: She exhibits edema (1+ right lower leg). She exhibits no  tenderness.        Feet:    Neurological: She is alert and oriented to person, place, and time.   Skin: Skin is warm and dry. No rash noted. She is not diaphoretic. No erythema.   Psychiatric: She has a normal mood and affect. Her behavior is normal. Judgment and thought content normal.   Nursing note and vitals reviewed.          PROCEDURE NOTE:  The wound was cauterized today by applying silver nitrate directly to the wound bed.  The patient tolerated the procedure well.  The wound was then covered with a dry dressing.    Assessment:       1. Delayed surgical wound healing, initial encounter    2. Abnormal granulation tissue               Plan:            Cleanse wound with dove soap and water.  Apply medihoney gel daily to leg wound, cover with adaptic and cotton gauze and secure with a mepore island dressing.  20-30 mmHg knee high compression hose right lower leg. Apply hose first thing in the morning and remove at bedtime. Do not sleep in the hose.  Return to clinic in one month.

## 2020-01-22 ENCOUNTER — PATIENT OUTREACH (OUTPATIENT)
Dept: ADMINISTRATIVE | Facility: OTHER | Age: 35
End: 2020-01-22

## 2020-01-23 ENCOUNTER — OFFICE VISIT (OUTPATIENT)
Dept: HEPATOLOGY | Facility: CLINIC | Age: 35
End: 2020-01-23
Attending: INTERNAL MEDICINE
Payer: COMMERCIAL

## 2020-01-23 VITALS
RESPIRATION RATE: 18 BRPM | OXYGEN SATURATION: 100 % | HEART RATE: 90 BPM | WEIGHT: 234.13 LBS | DIASTOLIC BLOOD PRESSURE: 81 MMHG | BODY MASS INDEX: 39.01 KG/M2 | HEIGHT: 65 IN | SYSTOLIC BLOOD PRESSURE: 135 MMHG

## 2020-01-23 DIAGNOSIS — K76.0 FATTY LIVER: ICD-10-CM

## 2020-01-23 PROCEDURE — 99999 PR PBB SHADOW E&M-EST. PATIENT-LVL III: CPT | Mod: PBBFAC,,, | Performed by: INTERNAL MEDICINE

## 2020-01-23 PROCEDURE — 99203 PR OFFICE/OUTPT VISIT, NEW, LEVL III, 30-44 MIN: ICD-10-PCS | Mod: S$GLB,,, | Performed by: INTERNAL MEDICINE

## 2020-01-23 PROCEDURE — 3075F SYST BP GE 130 - 139MM HG: CPT | Mod: CPTII,S$GLB,, | Performed by: INTERNAL MEDICINE

## 2020-01-23 PROCEDURE — 99999 PR PBB SHADOW E&M-EST. PATIENT-LVL III: ICD-10-PCS | Mod: PBBFAC,,, | Performed by: INTERNAL MEDICINE

## 2020-01-23 PROCEDURE — 3079F PR MOST RECENT DIASTOLIC BLOOD PRESSURE 80-89 MM HG: ICD-10-PCS | Mod: CPTII,S$GLB,, | Performed by: INTERNAL MEDICINE

## 2020-01-23 PROCEDURE — 99203 OFFICE O/P NEW LOW 30 MIN: CPT | Mod: S$GLB,,, | Performed by: INTERNAL MEDICINE

## 2020-01-23 PROCEDURE — 3079F DIAST BP 80-89 MM HG: CPT | Mod: CPTII,S$GLB,, | Performed by: INTERNAL MEDICINE

## 2020-01-23 PROCEDURE — 3075F PR MOST RECENT SYSTOLIC BLOOD PRESS GE 130-139MM HG: ICD-10-PCS | Mod: CPTII,S$GLB,, | Performed by: INTERNAL MEDICINE

## 2020-01-23 PROCEDURE — 3008F PR BODY MASS INDEX (BMI) DOCUMENTED: ICD-10-PCS | Mod: CPTII,S$GLB,, | Performed by: INTERNAL MEDICINE

## 2020-01-23 PROCEDURE — 3008F BODY MASS INDEX DOCD: CPT | Mod: CPTII,S$GLB,, | Performed by: INTERNAL MEDICINE

## 2020-01-23 NOTE — PROGRESS NOTES
HEPATOLOGY CONSULTATION    Referring Physician: Dr. KYAW Sarabia  Current Corresponding Physician: Dr. KYAW Sarabia    Reason for Consultation: Consultation for evaluation of Fatty Liver    History of Present Illness: Katarina Munguia is a 34 y.o. femalewho presents for evaluation of   Chief Complaint   Patient presents with    Fatty Liver    This 34-year-old woman has a diagnosis of nonalcoholic steatohepatitis.  This was picked up on abdominal ultrasound.  She has elevated serum transaminases.  Elastography testing suggested stage II to 3/4 fibrosis.  Her risk factor for nonalcoholic fatty do liver diseases a body mass index of 39.  She has no symptoms of hepatic decompensation.     Past Medical History:   Diagnosis Date    Allergy     Hypertension     Melanoma      Outpatient Encounter Medications as of 1/23/2020   Medication Sig Dispense Refill    amLODIPine (NORVASC) 5 MG tablet Take 1 tablet (5 mg total) by mouth once daily. 30 tablet 0    chlorthalidone (HYGROTEN) 25 MG Tab Take 1 tablet (25 mg total) by mouth once daily. 30 tablet 0    flu vacc [id number] 6mos up,PF, 60 mcg (15 mcg x 4)/0.5 mL Syrg inject as directed 0.5 mL 0    MICROGESTIN FE 1.5/30, 28, 1.5 mg-30 mcg (21)/75 mg (7) tablet Take 1 tablet by mouth once daily.       multivitamin capsule Take 1 capsule by mouth once daily.      pantoprazole (PROTONIX) 40 MG tablet Take 1 tablet (40 mg total) by mouth once daily. 90 tablet 1     No facility-administered encounter medications on file as of 1/23/2020.      Review of patient's allergies indicates:  No Known Allergies  Family History   Problem Relation Age of Onset    Hypertension Mother     Hyperlipidemia Mother     Transient ischemic attack Mother     Cancer Father     Lung cancer Father     Heart disease Maternal Grandfather     Heart disease Sister        Social History     Socioeconomic History    Marital status: Single     Spouse name: Not on file    Number of children: Not on file     Years of education: Not on file    Highest education level: Not on file   Occupational History    Not on file   Social Needs    Financial resource strain: Not very hard    Food insecurity:     Worry: Never true     Inability: Never true    Transportation needs:     Medical: No     Non-medical: No   Tobacco Use    Smoking status: Never Smoker    Smokeless tobacco: Never Used   Substance and Sexual Activity    Alcohol use: Yes     Alcohol/week: 1.0 standard drinks     Types: 1 Cans of beer per week     Frequency: 2-4 times a month     Drinks per session: 1 or 2     Binge frequency: Never     Comment: occasional    Drug use: No    Sexual activity: Not on file   Lifestyle    Physical activity:     Days per week: 5 days     Minutes per session: 50 min    Stress: Not at all   Relationships    Social connections:     Talks on phone: More than three times a week     Gets together: Once a week     Attends Jainism service: Not on file     Active member of club or organization: Yes     Attends meetings of clubs or organizations: 1 to 4 times per year     Relationship status: Living with partner   Other Topics Concern    Not on file   Social History Narrative    16 y/o son.       Review of Systems   Constitutional: Negative for appetite change, fatigue and unexpected weight change.   HENT: Negative for ear pain, hearing loss, sore throat and trouble swallowing.    Eyes: Negative for visual disturbance.   Respiratory: Negative for cough and shortness of breath.    Cardiovascular: Negative for chest pain and palpitations.   Gastrointestinal: Negative for abdominal pain, nausea and vomiting.   Genitourinary: Negative for difficulty urinating and dysuria.   Musculoskeletal: Negative for arthralgias and back pain.   Skin: Negative for rash.   Neurological: Negative for tremors, seizures and headaches.   Psychiatric/Behavioral: Negative for agitation and decreased concentration.     Vitals:    01/23/20 0947   BP:  135/81   Pulse: 90   Resp: 18       Physical Exam   Constitutional: She is oriented to person, place, and time. She appears well-developed and well-nourished.   HENT:   Right Ear: External ear normal.   Left Ear: External ear normal.   Mouth/Throat: Oropharynx is clear and moist.   Eyes: No scleral icterus.   Cardiovascular: Normal rate, regular rhythm and normal heart sounds. Exam reveals no gallop and no friction rub.   No murmur heard.  Pulmonary/Chest: Effort normal and breath sounds normal. No respiratory distress. She has no wheezes.   Abdominal: Soft. Bowel sounds are normal. She exhibits no distension and no mass. There is no tenderness.   Musculoskeletal: Normal range of motion. She exhibits no edema.   Lymphadenopathy:     She has no cervical adenopathy.   Neurological: She is alert and oriented to person, place, and time. She has normal strength.   Skin: Skin is warm, dry and intact. She is not diaphoretic. No pallor.       Computed MELD-Na score unavailable. Necessary lab results were not found in the last year.  Computed MELD score unavailable. Necessary lab results were not found in the last year.    Lab Results   Component Value Date     (H) 12/01/2019    BUN 9 12/01/2019    CREATININE 0.6 12/01/2019    CALCIUM 9.3 12/01/2019     12/01/2019    K 4.0 12/01/2019     12/01/2019    PROT 7.5 11/19/2019    CO2 28 12/01/2019    ANIONGAP 7 (L) 12/01/2019    WBC 10.80 11/19/2019    RBC 4.63 11/19/2019    HGB 13.6 11/19/2019    HCT 41.3 11/19/2019    MCV 89 11/19/2019    MCH 29.4 11/19/2019    MCHC 32.9 11/19/2019     Lab Results   Component Value Date    RDW 12.0 11/19/2019     11/19/2019    MPV 11.0 11/19/2019    GRAN 6.1 11/19/2019    GRAN 56.1 11/19/2019    LYMPH 4.0 11/19/2019    LYMPH 37.3 11/19/2019    MONO 0.5 11/19/2019    MONO 4.6 11/19/2019    EOSINOPHIL 1.1 11/19/2019    BASOPHIL 0.6 11/19/2019    EOS 0.1 11/19/2019    BASO 0.06 11/19/2019    CHOL 261 (H) 11/19/2019    TRIG  170 (H) 11/19/2019    HDL 50 11/19/2019    CHOLHDL 19.2 (L) 11/19/2019    TOTALCHOLEST 5.2 (H) 11/19/2019    ALBUMIN 4.2 11/19/2019    AST 52 (H) 11/19/2019     (H) 11/19/2019    ALKPHOS 44 (L) 11/19/2019       Assessment and Plan:  Patient Active Problem List   Diagnosis    Essential hypertension    Hyperlipidemia    Nasal polyp    History of melanoma    BMI 37.0-37.9, adult    Positive QuantiFERON-TB Gold test    Gastroesophageal reflux disease without esophagitis    Fatty liver    Malignant melanoma of right lower extremity    Dyspepsia    Acute right ankle pain    Right calf pain    Gait, antalgic    Ankle weakness    Decreased range of motion of right ankle    Delayed surgical wound healing    Abnormal granulation tissue     Katarina Munguia is a 34 y.o. female withFatty Liver    Impression:  TRAN; Stage 2-3/4 fibrosis    Plan:  I explained to the patient that the only treatment we have for fatty liver disease is weight loss and I recommend diet modification and exercise.  FDA approved pharmacotherapy may be available soon.  RTC in 6 months with labs.

## 2020-01-23 NOTE — LETTER
January 23, 2020      Shilpa Sarabia MD  [address] [facility] [address]           [facility] - Hepatology  [address]  [place] [address]  Phone: [phone]  Fax: [phone]          Patient: Katarina Munguia   MR Number: [id number]   YOB: 1985   Date of Visit: 1/23/2020       Dear Dr. Shilpa Sarabia:    Thank you for referring Katarina Munguia to me for evaluation. Attached you will find relevant portions of my assessment and plan of care.    If you have questions, please do not hesitate to call me. I look forward to following Katarina Munguia along with you.    Sincerely,    Paul Forde MD    Enclosure  CC:  No Recipients    If you would like to receive this communication electronically, please contact [email or web address] or [phone] to request more information on [employer] Link access.    For providers and/or their staff who would like to refer a patient to [facility], please contact us through our one-stop-shop provider referral line, [facility] [place], at [phone].    If you feel you have received this communication in error or would no longer like to receive these types of communications, please e-mail [email or web address]

## 2020-02-03 ENCOUNTER — PATIENT MESSAGE (OUTPATIENT)
Dept: WOUND CARE | Facility: CLINIC | Age: 35
End: 2020-02-03

## 2020-02-17 NOTE — PROGRESS NOTES
"[facility] Medicine: Health  Follow-Up    Patient reports she had a "major health set back" in October.  Patient reports she is finally getting back into "every day" life.          Follow Up  Follow-up reason(s): reading review      Readings are missing.   Patient knows to increase frequency of readings.      INTERVENTION(S)  encouragement/support    PLAN  additional monitoring needed and continue monitoring      There are no preventive care reminders to display for this patient.    Last 5 Patient Entered Readings                                      Current 30 Day Average: 118/84     Recent Readings 1/19/2020 1/7/2020 12/17/2019 12/9/2019 11/19/2019    SBP (mmHg) 118 116 110 115 129    DBP (mmHg) 84 81 87 81 82    Pulse 84 67 95 71 103            Diet Screening   No change to diet.      Physical Activity Screening   When asked if exercising, patient responded: yes    Patient reports she is back in the gym and able to exercise once again.  Patient reports she is back up to walking 2 miles.  Gave praises to patient.     "

## 2020-02-24 ENCOUNTER — TELEPHONE (OUTPATIENT)
Dept: FAMILY MEDICINE | Facility: CLINIC | Age: 35
End: 2020-02-24

## 2020-02-24 ENCOUNTER — OFFICE VISIT (OUTPATIENT)
Dept: FAMILY MEDICINE | Facility: CLINIC | Age: 35
End: 2020-02-24
Payer: COMMERCIAL

## 2020-02-24 VITALS
RESPIRATION RATE: 17 BRPM | SYSTOLIC BLOOD PRESSURE: 141 MMHG | TEMPERATURE: 98 F | HEIGHT: 65 IN | HEART RATE: 90 BPM | DIASTOLIC BLOOD PRESSURE: 87 MMHG | OXYGEN SATURATION: 96 % | BODY MASS INDEX: 40 KG/M2 | WEIGHT: 240.06 LBS

## 2020-02-24 DIAGNOSIS — J06.9 UPPER RESPIRATORY TRACT INFECTION, UNSPECIFIED TYPE: ICD-10-CM

## 2020-02-24 DIAGNOSIS — I10 ESSENTIAL HYPERTENSION: Primary | ICD-10-CM

## 2020-02-24 PROCEDURE — 3077F PR MOST RECENT SYSTOLIC BLOOD PRESSURE >= 140 MM HG: ICD-10-PCS | Mod: CPTII,S$GLB,, | Performed by: INTERNAL MEDICINE

## 2020-02-24 PROCEDURE — 99214 PR OFFICE/OUTPT VISIT, EST, LEVL IV, 30-39 MIN: ICD-10-PCS | Mod: 25,S$GLB,, | Performed by: INTERNAL MEDICINE

## 2020-02-24 PROCEDURE — 3008F PR BODY MASS INDEX (BMI) DOCUMENTED: ICD-10-PCS | Mod: CPTII,S$GLB,, | Performed by: INTERNAL MEDICINE

## 2020-02-24 PROCEDURE — 3077F SYST BP >= 140 MM HG: CPT | Mod: CPTII,S$GLB,, | Performed by: INTERNAL MEDICINE

## 2020-02-24 PROCEDURE — 96372 PR INJECTION,THERAP/PROPH/DIAG2ST, IM OR SUBCUT: ICD-10-PCS | Mod: S$GLB,,, | Performed by: INTERNAL MEDICINE

## 2020-02-24 PROCEDURE — 3008F BODY MASS INDEX DOCD: CPT | Mod: CPTII,S$GLB,, | Performed by: INTERNAL MEDICINE

## 2020-02-24 PROCEDURE — 99999 PR PBB SHADOW E&M-EST. PATIENT-LVL IV: CPT | Mod: PBBFAC,,, | Performed by: INTERNAL MEDICINE

## 2020-02-24 PROCEDURE — 99214 OFFICE O/P EST MOD 30 MIN: CPT | Mod: 25,S$GLB,, | Performed by: INTERNAL MEDICINE

## 2020-02-24 PROCEDURE — 3079F DIAST BP 80-89 MM HG: CPT | Mod: CPTII,S$GLB,, | Performed by: INTERNAL MEDICINE

## 2020-02-24 PROCEDURE — 96372 THER/PROPH/DIAG INJ SC/IM: CPT | Mod: S$GLB,,, | Performed by: INTERNAL MEDICINE

## 2020-02-24 PROCEDURE — 99999 PR PBB SHADOW E&M-EST. PATIENT-LVL IV: ICD-10-PCS | Mod: PBBFAC,,, | Performed by: INTERNAL MEDICINE

## 2020-02-24 PROCEDURE — 3079F PR MOST RECENT DIASTOLIC BLOOD PRESSURE 80-89 MM HG: ICD-10-PCS | Mod: CPTII,S$GLB,, | Performed by: INTERNAL MEDICINE

## 2020-02-24 RX ORDER — METHYLPREDNISOLONE 4 MG/1
TABLET ORAL
Qty: 1 PACKAGE | Refills: 0 | Status: SHIPPED | OUTPATIENT
Start: 2020-02-24 | End: 2020-02-24

## 2020-02-24 RX ORDER — PROMETHAZINE HYDROCHLORIDE AND DEXTROMETHORPHAN HYDROBROMIDE 6.25; 15 MG/5ML; MG/5ML
5 SYRUP ORAL EVERY 8 HOURS PRN
Qty: 118 ML | Refills: 0 | Status: SHIPPED | OUTPATIENT
Start: 2020-02-24 | End: 2020-02-24

## 2020-02-24 RX ORDER — PROMETHAZINE HYDROCHLORIDE AND DEXTROMETHORPHAN HYDROBROMIDE 6.25; 15 MG/5ML; MG/5ML
5 SYRUP ORAL EVERY 8 HOURS PRN
Qty: 118 ML | Refills: 0 | Status: SHIPPED | OUTPATIENT
Start: 2020-02-24 | End: 2020-03-05

## 2020-02-24 RX ORDER — FLUTICASONE PROPIONATE 50 MCG
1 SPRAY, SUSPENSION (ML) NASAL 2 TIMES DAILY
Qty: 16 G | Refills: 1 | Status: SHIPPED | OUTPATIENT
Start: 2020-02-24 | End: 2020-08-16 | Stop reason: SDUPTHER

## 2020-02-24 RX ORDER — METHYLPREDNISOLONE 4 MG/1
TABLET ORAL
Qty: 1 PACKAGE | Refills: 0 | Status: SHIPPED | OUTPATIENT
Start: 2020-02-24 | End: 2020-07-20

## 2020-02-24 RX ORDER — TRIAMCINOLONE ACETONIDE 40 MG/ML
40 INJECTION, SUSPENSION INTRA-ARTICULAR; INTRAMUSCULAR
Status: COMPLETED | OUTPATIENT
Start: 2020-02-24 | End: 2020-02-24

## 2020-02-24 RX ADMIN — TRIAMCINOLONE ACETONIDE 40 MG: 40 INJECTION, SUSPENSION INTRA-ARTICULAR; INTRAMUSCULAR at 09:02

## 2020-02-24 NOTE — PROGRESS NOTES
Subjective:       Patient ID: Katarina Munguia is a 34 y.o. female.    Chief Complaint: Sinus Problem; Sore Throat; and Establish Care    URI    This is a new problem. The current episode started in the past 7 days. The problem has been gradually worsening. There has been no fever. Associated symptoms include congestion, rhinorrhea and a sore throat. Pertinent negatives include no abdominal pain, chest pain, coughing, diarrhea, dysuria, ear pain, headaches, nausea, plugged ear sensation, rash, vomiting or wheezing. She has tried decongestant and antihistamine (nasal steroid systemic steroid (prednisone prescribed 2/18)) for the symptoms. The treatment provided mild relief.   sore throat minimal cough x one week prescribed augmentin and prednisone six days ago. Took prednisone x five days. Having difficulty swallowing augmentin due to pill size and pain. No nausea no change in breathing with lying flat no difficulty swallowing secretions (pain with food and liquids no sensation of getting stuck) EGD in Nov 2019 gastritis h pylori negative normal esophagus  Review of Systems   Constitutional: Negative for activity change, appetite change, fatigue, fever and unexpected weight change.   HENT: Positive for congestion, rhinorrhea and sore throat. Negative for ear pain.    Eyes: Negative for discharge and visual disturbance.   Respiratory: Negative for cough, chest tightness, shortness of breath and wheezing.    Cardiovascular: Negative for chest pain, palpitations and leg swelling.   Gastrointestinal: Negative for abdominal pain, constipation, diarrhea, nausea and vomiting.   Endocrine: Negative for cold intolerance and heat intolerance.   Genitourinary: Negative for dysuria and hematuria.   Musculoskeletal: Negative for joint swelling and neck stiffness.   Skin: Negative for rash.   Neurological: Negative for dizziness, syncope, weakness and headaches.   Psychiatric/Behavioral: Negative for suicidal ideas.      "  Objective:     Vitals:    02/24/20 0821   BP: (!) 141/87   BP Location: Right arm   Patient Position: Sitting   Pulse: 90   Resp: 17   Temp: 97.9 °F (36.6 °C)   TempSrc: Oral   SpO2: 96%   Weight: 108.9 kg (240 lb 1.3 oz)   Height: 5' 5" (1.651 m)          Physical Exam   Constitutional: She is oriented to person, place, and time. She appears well-developed and well-nourished.   HENT:   Head: Normocephalic and atraumatic.   Right Ear: Tympanic membrane normal.   Left Ear: Tympanic membrane normal.   Nose: Mucosal edema and rhinorrhea present.   Mouth/Throat: Uvula is midline and mucous membranes are normal. Posterior oropharyngeal erythema present. No oropharyngeal exudate.   Eyes: Conjunctivae are normal. No scleral icterus.   Neck: Normal range of motion. Neck supple. No thyromegaly present.   Cardiovascular: Normal rate and regular rhythm. Exam reveals no gallop and no friction rub.   No murmur heard.  Pulmonary/Chest: Effort normal and breath sounds normal. She has no wheezes. She has no rales.   Abdominal: Soft. Bowel sounds are normal. There is no tenderness. There is no rebound and no guarding.   Musculoskeletal: Normal range of motion. She exhibits no edema or tenderness.   Lymphadenopathy:     She has no cervical adenopathy.   Neurological: She is alert and oriented to person, place, and time. No cranial nerve deficit.   Skin: Skin is warm and dry.   Psychiatric: She has a normal mood and affect.       Assessment and Plan   1. Essential hypertension  Just above goal in setting of viral illness will hold off medication adjustments    2. Upper respiratory tract infection, unspecified type  No evidence of bacterial nidus normal range of motion of cervical spine w/o lymphadenopathy normal lung exam able to lie supine w/o difficulyt in breathing. IM steroid for anti inflammatory effect medrol dose pack, saline gargles, and cough suppressant prn  - triamcinolone acetonide injection 40 mg  - methylPREDNISolone " (MEDROL DOSEPACK) 4 mg tablet; use as directed  Dispense: 1 Package; Refill: 0  - fluticasone propionate (FLONASE) 50 mcg/actuation nasal spray; 1 spray (50 mcg total) by Each Nostril route 2 (two) times daily.  Dispense: 16 g; Refill: 1  - promethazine-dextromethorphan (PROMETHAZINE-DM) 6.25-15 mg/5 mL Syrp; Take 5 mLs by mouth every 8 (eight) hours as needed (cough sore throat).  Dispense: 118 mL; Refill: 0

## 2020-02-24 NOTE — TELEPHONE ENCOUNTER
----- Message from Nadja Keith sent at 2/24/2020 10:03 AM CST -----  Contact: stanislav /[facility] /[phone]   Name of Who is Calling:     What is the request in detail:  Pharmacy request patient prescriptions be resent ..pharmacy has not received it //patient at pharmacy waiting Please contact to further discuss and advise      Can the clinic reply by [facility]: no     What Number to Call Back if not in [facility]:  stanislav /[facility] /[phone]

## 2020-02-24 NOTE — PROGRESS NOTES
Pt tolerated Kenalog injection well. Instructed to wait in the clinic for 15 minutes and report any adverse effects immediately to the nurse. Verbalized understanding.

## 2020-02-25 ENCOUNTER — PATIENT MESSAGE (OUTPATIENT)
Dept: ADMINISTRATIVE | Facility: OTHER | Age: 35
End: 2020-02-25

## 2020-02-26 DIAGNOSIS — I10 ESSENTIAL HYPERTENSION: ICD-10-CM

## 2020-02-27 RX ORDER — CHLORTHALIDONE 25 MG/1
25 TABLET ORAL DAILY
Qty: 30 TABLET | Refills: 0 | Status: SHIPPED | OUTPATIENT
Start: 2020-02-27 | End: 2020-06-01 | Stop reason: SDUPTHER

## 2020-03-24 PROBLEM — R26.89 GAIT, ANTALGIC: Status: RESOLVED | Noted: 2019-12-05 | Resolved: 2020-03-24

## 2020-03-24 PROBLEM — M79.661 RIGHT CALF PAIN: Status: RESOLVED | Noted: 2019-12-05 | Resolved: 2020-03-24

## 2020-03-24 PROBLEM — M25.671 DECREASED RANGE OF MOTION OF RIGHT ANKLE: Status: RESOLVED | Noted: 2019-12-05 | Resolved: 2020-03-24

## 2020-03-24 PROBLEM — R29.898 ANKLE WEAKNESS: Status: RESOLVED | Noted: 2019-12-05 | Resolved: 2020-03-24

## 2020-03-24 PROBLEM — M25.571 ACUTE RIGHT ANKLE PAIN: Status: RESOLVED | Noted: 2019-12-05 | Resolved: 2020-03-24

## 2020-04-13 ENCOUNTER — PATIENT OUTREACH (OUTPATIENT)
Dept: OTHER | Facility: OTHER | Age: 35
End: 2020-04-13

## 2020-05-26 NOTE — PROGRESS NOTES
Digital Medicine: Health  Follow-Up    Patient denies any other concerns at this time.           Follow Up  Follow-up reason(s): reading review      Readings are missing.   patient reminder needed.  Patient apologizes for not submitting regular readings but states she will increase frequency of her BP readings.       INTERVENTION(S)  encouragement/support    PLAN  continue monitoring      There are no preventive care reminders to display for this patient.    Last 5 Patient Entered Readings                                      Current 30 Day Average: 128/85     Recent Readings 5/21/2020 5/20/2020 4/3/2020 1/19/2020 1/7/2020    SBP (mmHg) 128 128 126 118 116    DBP (mmHg) 85 85 86 84 81    Pulse 75 75 69 84 67            Physical Activity Screening   When asked if exercising, patient responded: no    She identified the following barriers to physical activity: unable to get to gym/afford it    Patient reports she has not been working out due to gyms being closed for covid-19.  Patient states she is ready to get back into her routine at the gym.

## 2020-06-01 DIAGNOSIS — I10 ESSENTIAL HYPERTENSION: ICD-10-CM

## 2020-06-02 RX ORDER — CHLORTHALIDONE 25 MG/1
25 TABLET ORAL DAILY
Qty: 30 TABLET | Refills: 3 | Status: SHIPPED | OUTPATIENT
Start: 2020-06-02 | End: 2020-09-18 | Stop reason: SDUPTHER

## 2020-06-23 ENCOUNTER — PATIENT MESSAGE (OUTPATIENT)
Dept: INTERNAL MEDICINE | Facility: CLINIC | Age: 35
End: 2020-06-23

## 2020-06-24 ENCOUNTER — OFFICE VISIT (OUTPATIENT)
Dept: INTERNAL MEDICINE | Facility: CLINIC | Age: 35
End: 2020-06-24
Payer: COMMERCIAL

## 2020-06-24 ENCOUNTER — PATIENT MESSAGE (OUTPATIENT)
Dept: INTERNAL MEDICINE | Facility: CLINIC | Age: 35
End: 2020-06-24

## 2020-06-24 DIAGNOSIS — U07.1 COVID-19 VIRUS DETECTED: Primary | ICD-10-CM

## 2020-06-24 PROCEDURE — 99213 PR OFFICE/OUTPT VISIT, EST, LEVL III, 20-29 MIN: ICD-10-PCS | Mod: 95,,, | Performed by: INTERNAL MEDICINE

## 2020-06-24 PROCEDURE — 99213 OFFICE O/P EST LOW 20 MIN: CPT | Mod: 95,,, | Performed by: INTERNAL MEDICINE

## 2020-06-24 NOTE — PROGRESS NOTES
Subjective:       Patient ID: Katarina Munguia is a 35 y.o. female.    Chief Complaint: covid 19    HPI   Katarina Munguia is a 35 y.o. female presenting via video visit for evaluation/follow up of the following issues. This visit occurs during the COVID 19 outbreak.     The patient location is: patient's home  The chief complaint leading to consultation is: tested positive for COVID 19  Visit type: Virtual visit with synchronous audio and video  Total time spent with patient: 13 minutes    Each patient to whom he or she provides medical services by telemedicine is:  (1) informed of the relationship between the physician and patient and the respective role of any other health care provider with respect to management of the patient; and (2) notified that he or she may decline to receive medical services by telemedicine and may withdraw from such care at any time.    She tested positive for COVID 19.  Her sister tested positive on 6/14. Patient tested negative on Friday and yesterday positive. Exposed 9 days ago.   She feels like she has a bad cold currently - cough, sneezing, decreased smell. Last fever 3 days ago.   She is staying home and isolated.  Has taken ibuprofen for ha.     Her 20 yo son is also having cough.       Review of Systems   Constitutional: Negative for activity change and unexpected weight change.   HENT: Positive for rhinorrhea. Negative for hearing loss and trouble swallowing.    Eyes: Negative for discharge and visual disturbance.   Respiratory: Positive for chest tightness. Negative for wheezing.    Cardiovascular: Negative for chest pain and palpitations.   Gastrointestinal: Positive for diarrhea. Negative for blood in stool, constipation and vomiting.   Endocrine: Negative for polydipsia and polyuria.   Genitourinary: Negative for difficulty urinating, dysuria, hematuria and menstrual problem.   Musculoskeletal: Positive for neck pain. Negative for arthralgias and joint swelling.    Neurological: Positive for headaches. Negative for weakness.   Psychiatric/Behavioral: Negative for confusion and dysphoric mood.       Objective:   There were no vitals taken for this visit.     Physical Exam  Constitutional:       General: She is not in acute distress.     Appearance: She is well-developed. She is not diaphoretic.   HENT:      Head: Atraumatic.   Pulmonary:      Effort: Pulmonary effort is normal. No respiratory distress.   Neurological:      Mental Status: She is alert and oriented to person, place, and time.   Psychiatric:         Behavior: Behavior normal.         Assessment:       1. COVID-19 virus detected        Plan:       Diagnoses and all orders for this visit:    COVID-19 virus detected  -     COVID-19 Home Symptom Monitoring  - Duration (days): 14  I have discussed home isolation recommendations with her at length. Supportive care, rest, fluids. Quarantine of household contacts. Precautions to seek medical care if needed.

## 2020-06-24 NOTE — Clinical Note
ITZEL Mckeon I saw your patient by video visit. She tested positive for COVID 19 at outside facility. Home care and self quarantine discussed at length.

## 2020-07-15 ENCOUNTER — PATIENT OUTREACH (OUTPATIENT)
Dept: OTHER | Facility: OTHER | Age: 35
End: 2020-07-15

## 2020-07-20 ENCOUNTER — OFFICE VISIT (OUTPATIENT)
Dept: INTERNAL MEDICINE | Facility: CLINIC | Age: 35
End: 2020-07-20
Payer: COMMERCIAL

## 2020-07-20 VITALS
WEIGHT: 237.44 LBS | HEIGHT: 65 IN | DIASTOLIC BLOOD PRESSURE: 84 MMHG | SYSTOLIC BLOOD PRESSURE: 124 MMHG | OXYGEN SATURATION: 99 % | HEART RATE: 86 BPM | BODY MASS INDEX: 39.56 KG/M2

## 2020-07-20 DIAGNOSIS — K76.0 FATTY LIVER: ICD-10-CM

## 2020-07-20 DIAGNOSIS — K21.9 GASTROESOPHAGEAL REFLUX DISEASE WITHOUT ESOPHAGITIS: ICD-10-CM

## 2020-07-20 DIAGNOSIS — I10 ESSENTIAL HYPERTENSION: ICD-10-CM

## 2020-07-20 DIAGNOSIS — C43.71 MALIGNANT MELANOMA OF RIGHT LOWER EXTREMITY: ICD-10-CM

## 2020-07-20 DIAGNOSIS — E78.5 HYPERLIPIDEMIA, UNSPECIFIED HYPERLIPIDEMIA TYPE: ICD-10-CM

## 2020-07-20 PROCEDURE — 99395 PREV VISIT EST AGE 18-39: CPT | Mod: S$PBB,,, | Performed by: INTERNAL MEDICINE

## 2020-07-20 PROCEDURE — 99395 PR PREVENTIVE VISIT,EST,18-39: ICD-10-PCS | Mod: S$PBB,,, | Performed by: INTERNAL MEDICINE

## 2020-07-20 PROCEDURE — 99999 PR PBB SHADOW E&M-EST. PATIENT-LVL IV: ICD-10-PCS | Mod: PBBFAC,,, | Performed by: INTERNAL MEDICINE

## 2020-07-20 PROCEDURE — 99999 PR PBB SHADOW E&M-EST. PATIENT-LVL IV: CPT | Mod: PBBFAC,,, | Performed by: INTERNAL MEDICINE

## 2020-07-20 PROCEDURE — 99214 OFFICE O/P EST MOD 30 MIN: CPT | Mod: PBBFAC | Performed by: INTERNAL MEDICINE

## 2020-07-20 NOTE — PROGRESS NOTES
Internal Medicine    Subjective:      Patient ID: Katarina Munguia is a 35 y.o. female.    Chief Complaint: Annual Exam    HPI:  Patient presents for follow up appointment.  The patient's last visit with me was on 9/20/2019.     HTN:  Taking Norvasc 5mg daily and chlorthalidone 25mg daily.  BP stable today.      HLD:  Currently not on medication.  Total cholesterol 261 and  last year.       Fatty liver:  By ultrasound on 8/30/19.  LFT's elevated 11/19/19.  Saw Hepatology on 1/23/20, who did not draw additional labs.  Fibroscan showed Stage 2-3/4 fibrosis.     GERD:  Taking Protonix 40mg daily PRN.  Helps very much.       H/o nasal congestion:  Uses Flonase daily, which is helpful.     H/o melanoma x2:  Had second melanoma (T1aN0 stage IA) on right ankle since last appointment.  First was on her back.  Followed by Dermatology, Dr. Howard - gets annual screens.     ObGyn:  Dr. Mas at [facility].        Review of Systems   Constitutional: Negative for activity change, appetite change, chills, fatigue, fever and unexpected weight change.   HENT: Negative for hearing loss, rhinorrhea, sore throat and trouble swallowing.    Eyes: Negative for discharge and visual disturbance.   Respiratory: Negative for cough, chest tightness, shortness of breath and wheezing.    Cardiovascular: Negative for chest pain, palpitations and leg swelling.   Gastrointestinal: Negative for abdominal pain, blood in stool, constipation, diarrhea, nausea and vomiting.   Endocrine: Negative for polydipsia and polyuria.   Genitourinary: Negative for difficulty urinating, dysuria, hematuria and menstrual problem.   Musculoskeletal: Negative for arthralgias, joint swelling, myalgias and neck pain.   Skin: Negative for rash and wound.   Neurological: Negative for dizziness, weakness, numbness and headaches.   Psychiatric/Behavioral: Negative for behavioral problems, confusion and dysphoric mood.       Past medical history, surgical history, and  "family medical history reviewed and updated as appropriate.    Medications and allergies reviewed.     Objective:     Vitals:    07/20/20 1506   BP: 124/84   BP Location: Right arm   Patient Position: Sitting   BP Method: Large (Manual)   Pulse: 86   SpO2: 99%   Weight: 107.7 kg (237 lb 7 oz)   Height: 5' 5" (1.651 m)     Physical Exam  Constitutional:       General: She is not in acute distress.     Appearance: She is well-developed.   HENT:      Head: Normocephalic and atraumatic.   Eyes:      General: No scleral icterus.     Conjunctiva/sclera: Conjunctivae normal.   Neck:      Thyroid: No thyromegaly.   Cardiovascular:      Rate and Rhythm: Normal rate and regular rhythm.      Heart sounds: No murmur. No friction rub. No gallop.    Pulmonary:      Effort: Pulmonary effort is normal. No respiratory distress.      Breath sounds: Normal breath sounds. No wheezing or rales.   Abdominal:      General: There is no distension.      Palpations: Abdomen is soft. There is no mass.      Tenderness: There is no abdominal tenderness. There is no guarding or rebound.   Musculoskeletal:         General: No tenderness.      Right lower leg: No edema.      Left lower leg: No edema.   Lymphadenopathy:      Cervical: No cervical adenopathy.   Skin:     Findings: No erythema or rash.   Neurological:      Mental Status: She is alert and oriented to person, place, and time. Mental status is at baseline.   Psychiatric:         Mood and Affect: Mood normal.         Behavior: Behavior normal.         Thought Content: Thought content normal.         Judgment: Judgment normal.         RESULTS:   Lab Results   Component Value Date    WBC 10.80 11/19/2019    HGB 13.6 11/19/2019    HCT 41.3 11/19/2019    MCV 89 11/19/2019     11/19/2019     BMP  Lab Results   Component Value Date     07/22/2020    K 3.8 07/22/2020     07/22/2020    CO2 25 07/22/2020    BUN 13 07/22/2020    CREATININE 0.7 07/22/2020    CALCIUM 8.8 " 07/22/2020    ANIONGAP 8 07/22/2020    ESTGFRAFRICA >60 07/22/2020    EGFRNONAA >60 07/22/2020     Lab Results   Component Value Date    ALT 25 07/22/2020    AST 16 07/22/2020    ALKPHOS 38 (L) 07/22/2020    BILITOT 0.6 07/22/2020     Lab Results   Component Value Date    TSH 1.427 11/19/2019     Lab Results   Component Value Date    HGBA1C 5.0 11/19/2019         Assessment:     1. BMI 39.0-39.9,adult    2. Essential hypertension    3. Hyperlipidemia, unspecified hyperlipidemia type    4. Fatty liver    5. Malignant melanoma of right lower extremity    6. Gastroesophageal reflux disease without esophagitis        Plan:     *Continue medication/plan as discussed in HPI except for changes discussed below.    Katarina was seen today for annual exam.    Diagnoses and all orders for this visit:    BMI 39.0-39.9,adult  -     Ambulatory referral/consult to Bariatric Medicine; Future; Expected date: 07/27/2020    Essential hypertension  -     Comprehensive metabolic panel; Future; Expected date: 07/20/2020    Hyperlipidemia, unspecified hyperlipidemia type  -     Lipid Panel; Future; Expected date: 07/20/2020    Fatty liver  -     Comprehensive metabolic panel; Future; Expected date: 07/20/2020    Malignant melanoma of right lower extremity    Gastroesophageal reflux disease without esophagitis    Discussed with patient that I will be leaving Primary Care.  Transition of care discussed.      Health maintenance reviewed with patient.     Follow up in about 6 months (around 1/20/2021).    Shilpa Sarabia MD  Internal Medicine  [facility]

## 2020-07-22 ENCOUNTER — LAB VISIT (OUTPATIENT)
Dept: LAB | Facility: HOSPITAL | Age: 35
End: 2020-07-22
Attending: INTERNAL MEDICINE
Payer: COMMERCIAL

## 2020-07-22 DIAGNOSIS — I10 ESSENTIAL HYPERTENSION: ICD-10-CM

## 2020-07-22 DIAGNOSIS — E78.5 HYPERLIPIDEMIA, UNSPECIFIED HYPERLIPIDEMIA TYPE: ICD-10-CM

## 2020-07-22 DIAGNOSIS — K76.0 FATTY LIVER: ICD-10-CM

## 2020-07-22 LAB
ALBUMIN SERPL BCP-MCNC: 4 G/DL (ref 3.5–5.2)
ALP SERPL-CCNC: 38 U/L (ref 55–135)
ALT SERPL W/O P-5'-P-CCNC: 25 U/L (ref 10–44)
ANION GAP SERPL CALC-SCNC: 8 MMOL/L (ref 8–16)
AST SERPL-CCNC: 16 U/L (ref 10–40)
BILIRUB SERPL-MCNC: 0.6 MG/DL (ref 0.1–1)
BUN SERPL-MCNC: 13 MG/DL (ref 6–20)
CALCIUM SERPL-MCNC: 8.8 MG/DL (ref 8.7–10.5)
CHLORIDE SERPL-SCNC: 106 MMOL/L (ref 95–110)
CHOLEST SERPL-MCNC: 270 MG/DL (ref 120–199)
CHOLEST/HDLC SERPL: 5.1 {RATIO} (ref 2–5)
CO2 SERPL-SCNC: 25 MMOL/L (ref 23–29)
CREAT SERPL-MCNC: 0.7 MG/DL (ref 0.5–1.4)
EST. GFR  (AFRICAN AMERICAN): >60 ML/MIN/1.73 M^2
EST. GFR  (NON AFRICAN AMERICAN): >60 ML/MIN/1.73 M^2
GLUCOSE SERPL-MCNC: 106 MG/DL (ref 70–110)
HDLC SERPL-MCNC: 53 MG/DL (ref 40–75)
HDLC SERPL: 19.6 % (ref 20–50)
LDLC SERPL CALC-MCNC: 180.8 MG/DL (ref 63–159)
NONHDLC SERPL-MCNC: 217 MG/DL
POTASSIUM SERPL-SCNC: 3.8 MMOL/L (ref 3.5–5.1)
PROT SERPL-MCNC: 7 G/DL (ref 6–8.4)
SODIUM SERPL-SCNC: 139 MMOL/L (ref 136–145)
TRIGL SERPL-MCNC: 181 MG/DL (ref 30–150)

## 2020-07-22 PROCEDURE — 80053 COMPREHEN METABOLIC PANEL: CPT

## 2020-07-22 PROCEDURE — 36415 COLL VENOUS BLD VENIPUNCTURE: CPT

## 2020-07-22 PROCEDURE — 80061 LIPID PANEL: CPT

## 2020-07-25 ENCOUNTER — PATIENT MESSAGE (OUTPATIENT)
Dept: INTERNAL MEDICINE | Facility: CLINIC | Age: 35
End: 2020-07-25

## 2020-07-25 DIAGNOSIS — E78.5 HYPERLIPIDEMIA, UNSPECIFIED HYPERLIPIDEMIA TYPE: Primary | ICD-10-CM

## 2020-07-27 RX ORDER — ATORVASTATIN CALCIUM 10 MG/1
10 TABLET, FILM COATED ORAL DAILY
Qty: 90 TABLET | Refills: 1 | Status: SHIPPED | OUTPATIENT
Start: 2020-07-27 | End: 2021-03-09

## 2020-07-28 RX ORDER — PANTOPRAZOLE SODIUM 40 MG/1
40 TABLET, DELAYED RELEASE ORAL DAILY
Qty: 90 TABLET | Refills: 1 | Status: SHIPPED | OUTPATIENT
Start: 2020-07-28 | End: 2021-03-09

## 2020-07-28 NOTE — TELEPHONE ENCOUNTER
----- Message from Barbara Kay sent at 7/28/2020  8:43 AM CDT -----  Contact: Alex mari/ [facility] [phone]  Type:  RX Refill Request    Who Called: Alex  Refill or New Rx:Refill   RX Name and Strength:pantoprazole (PROTONIX) 40 MG tablet  How is the patient currently taking it? (ex. 1XDay):Take 1 tablet (40 mg total) by mouth once daily  Is this a 30 day or 90 day RX:90  Preferred Pharmacy with phone number:[facility]te Stout  Local or Mail Order:Local   Ordering Provider:Dr. Garcia   Would the patient rather a call back or a response via [facility]? Call back   Best Call Back Number:[phone]  Additional Information:

## 2020-08-18 ENCOUNTER — PATIENT OUTREACH (OUTPATIENT)
Dept: OTHER | Facility: OTHER | Age: 35
End: 2020-08-18

## 2020-08-18 NOTE — PROGRESS NOTES
Digital Medicine: Clinician Follow-Up    Called patient to discuss her missed readings.    The history is provided by the patient.   Follow-up reason(s): routine follow up.     Patient is not experiencing signs/symptoms of hypotension.  Patient is not experiencing signs/symptoms of hypertension.          Last 5 Patient Entered Readings                                      Current 30 Day Average:      Recent Readings 5/21/2020 5/20/2020 4/3/2020 1/19/2020 1/7/2020    SBP (mmHg) 128 128 126 118 116    DBP (mmHg) 85 85 86 84 81    Pulse 75 75 69 84 67                           Medication Adherence-Medication adherence was assessed.        She takes it every morning with her other medications. She does not use a pill container or alarm on her phone.      ASSESSMENT(S)  Missed readings and cannot assess her control rate.    Hypertension Plan  Additional monitoring needed. Patient will resume weekly readings.  Continue current therapy.  Continue current diet/physical activity routine. Works out every morning with her boyfriend.       Addressed any questions or concerns and patient has my contact information if needed prior to next outreach. Patient verbalizes understanding.            There are no preventive care reminders to display for this patient.      Hypertension Medications             amLODIPine (NORVASC) 5 MG tablet Take 1 tablet (5 mg total) by mouth once daily.    chlorthalidone (HYGROTEN) 25 MG Tab Take 1 tablet (25 mg total) by mouth once daily.

## 2020-08-28 ENCOUNTER — PATIENT OUTREACH (OUTPATIENT)
Dept: ADMINISTRATIVE | Facility: OTHER | Age: 35
End: 2020-08-28

## 2020-08-28 ENCOUNTER — TELEPHONE (OUTPATIENT)
Dept: BARIATRICS | Facility: CLINIC | Age: 35
End: 2020-08-28

## 2020-08-28 NOTE — PROGRESS NOTES
LINKS immunization registry updated  Care Everywhere updated  Health Maintenance updated  Chart reviewed for overdue Proactive [facility] ([facility]) health maintenance testing (CRS, Breast Ca, Diabetic Eye Exam)   Orders entered:N/A

## 2020-08-28 NOTE — TELEPHONE ENCOUNTER
Called Pt. To rescheduled her appt as she requested. Appt was successfully reschedule. Pt. Verbalized understanding.

## 2020-09-09 ENCOUNTER — INITIAL CONSULT (OUTPATIENT)
Dept: BARIATRICS | Facility: CLINIC | Age: 35
End: 2020-09-09

## 2020-09-09 VITALS
DIASTOLIC BLOOD PRESSURE: 90 MMHG | OXYGEN SATURATION: 98 % | BODY MASS INDEX: 39.87 KG/M2 | SYSTOLIC BLOOD PRESSURE: 130 MMHG | HEART RATE: 81 BPM | HEIGHT: 65 IN | WEIGHT: 239.31 LBS

## 2020-09-09 DIAGNOSIS — I10 ESSENTIAL HYPERTENSION: ICD-10-CM

## 2020-09-09 DIAGNOSIS — E78.5 HYPERLIPIDEMIA, UNSPECIFIED HYPERLIPIDEMIA TYPE: ICD-10-CM

## 2020-09-09 DIAGNOSIS — E66.01 CLASS 2 SEVERE OBESITY DUE TO EXCESS CALORIES WITH SERIOUS COMORBIDITY AND BODY MASS INDEX (BMI) OF 39.0 TO 39.9 IN ADULT: Primary | ICD-10-CM

## 2020-09-09 DIAGNOSIS — K21.9 GASTROESOPHAGEAL REFLUX DISEASE WITHOUT ESOPHAGITIS: ICD-10-CM

## 2020-09-09 DIAGNOSIS — K76.0 FATTY LIVER: ICD-10-CM

## 2020-09-09 PROBLEM — E66.812 CLASS 2 SEVERE OBESITY DUE TO EXCESS CALORIES WITH SERIOUS COMORBIDITY AND BODY MASS INDEX (BMI) OF 39.0 TO 39.9 IN ADULT: Status: ACTIVE | Noted: 2018-06-03

## 2020-09-09 PROCEDURE — 99215 OFFICE O/P EST HI 40 MIN: CPT | Mod: S$GLB,,, | Performed by: STUDENT IN AN ORGANIZED HEALTH CARE EDUCATION/TRAINING PROGRAM

## 2020-09-09 PROCEDURE — 99999 PR PBB SHADOW E&M-EST. PATIENT-LVL IV: ICD-10-PCS | Mod: PBBFAC,,, | Performed by: STUDENT IN AN ORGANIZED HEALTH CARE EDUCATION/TRAINING PROGRAM

## 2020-09-09 PROCEDURE — 99999 PR PBB SHADOW E&M-EST. PATIENT-LVL IV: CPT | Mod: PBBFAC,,, | Performed by: STUDENT IN AN ORGANIZED HEALTH CARE EDUCATION/TRAINING PROGRAM

## 2020-09-09 PROCEDURE — 99215 PR OFFICE/OUTPT VISIT, EST, LEVL V, 40-54 MIN: ICD-10-PCS | Mod: S$GLB,,, | Performed by: STUDENT IN AN ORGANIZED HEALTH CARE EDUCATION/TRAINING PROGRAM

## 2020-09-09 RX ORDER — SEMAGLUTIDE 1.34 MG/ML
0.25 INJECTION, SOLUTION SUBCUTANEOUS
Qty: 1.5 ML | Refills: 0 | Status: SHIPPED | OUTPATIENT
Start: 2020-09-09 | End: 2020-10-02 | Stop reason: SDUPTHER

## 2020-09-09 NOTE — PROGRESS NOTES
Digital Medicine: Health  Follow-Up    The history is provided by the patient.                 Patient needs assistance troubleshooting: patient reminder needed.      Topics Covered on Call: missing readings    Additional Follow-up details: Patient messaged via [facility] stating that she will begin taking readings again. I provided encouragement. Will follow up in 3 weeks to assess readings and lifestyle factors or remind patient to take readings.         Diet-Not assessed          Physical Activity-Not assessed    Medication Adherence-Medication Adherence not addressed.      Substance, Sleep, Stress-Not assessed      Additional monitoring needed.       Addressed any questions or concerns and patient has my contact information if needed prior to next outreach. Patient verbalizes understanding.          There are no preventive care reminders to display for this patient.    Last 5 Patient Entered Readings                                      Current 30 Day Average:      Recent Readings 5/21/2020 5/20/2020 4/3/2020 1/19/2020 1/7/2020    SBP (mmHg) 128 128 126 118 116    DBP (mmHg) 85 85 86 84 81    Pulse 75 75 69 84 67

## 2020-09-09 NOTE — PROGRESS NOTES
BARIATRIC POST-OPERATIVE FOLLOW UP:    HPI:  35 y.o.-year-old female presents for ***.    Denies: nausea, vomiting, abdominal pain, changes in bowel movement pattern, fever, chills, dysphagia, chest pain, and shortness of breath.    HPI    ROS:    Review of Systems    EXERCISE:  ***    VITAMINS:  ***    MEDICATIONS/ALLERGIES:  Have been reviewed.    DIET:  ***    PHYSICAL EXAM:  GENERAL: 35 y.o.-year-old female in NAD, A&O x3  VITAL SIGNS:  BP: (!) 130/90  CHEST: Clear to auscultation, regular effort.  HEART: Regular rate and rhythm. No murmurs, clicks, or gallops.  ABDOMEN: Bowel sounds present in all four quadrants. Soft, non-tender, non-distended. Well-healing surgical scars are clean, dry, and intact without signs of infection or bleeding.  EXTREMITIES: No clubbing, cyanosis, or edema.    Physical Exam    ASSESSMENT:  - Morbid obesity s/p {Surgery:17712} on ***.  - Co-morbidities: {Co-morbidities:98550}  - Weight loss  - Exercise regimen  - Diet    PLAN:  - Emphasized the importance of regular exercise and adherence to bariatric diet to achieve maximum weight loss.  - Encouraged patient to begin OR continue regular exercise.  - Follow-up with dietician to {Advance / Reinforce:80628} diet.  - Continue daily vitamins and medications.  - RTC in *** months or sooner if needed.  - Call the office for any issues.  - Check labs today.

## 2020-09-09 NOTE — PATIENT INSTRUCTIONS
Start Ozempic once a week. Start with 0.25mg once a week x 4 weeks.  Decrease portions as soon as you start Ozempic. Some nausea in the first 2 weeks is not unusual.   If you get pain across the upper abdomen and around to your back, please call the office.     Go to www.ozempic.[employer] for instructions and savings card.    Weekly Weight:  Weigh yourself at least once a week to help keep track of your progress between visits. Tracking your weight will provide you with important feedback about the changes you are making. To measure your weight as accurately as possible, weigh yourself at the same time of day, wearing the same clothes, and using the same scale.       Food and Beverage Log:  Keep a log of all food and beverages that you consume.  Keeping track of calories will help you lose weight, because monitoring will help you become more aware of what you are eating and recognize your eating patterns.  Be mindful of your hunger level before eating and your satiety level after eating.  Just keeping records will help you make healthy changes in your diet and eat fewer calories.    Tips for keeping a calorie count:     ? Each day, aim for the daily calorie goal of 1200 calories/day.     ? Record your food intake immediately after eating. If possible, look up calories before or immediately after eating.     ? Download an miguelangel like [employer] Fitness Pal, Lose It!, or [employer] (Code: 00594) to keep track of your calories.    ? Measuring foods (using measuring cups or spoons) will help you be more accurate with counting calories.     ? Keep a running calorie count throughout the day.     ? Count daily calories toward a weekly calorie total. If you eat too many calories one day, cut back slightly over the next few days to meet your weekly goal.       Meal Planning & Grocery Shopping    Meal planning builds the foundation for healthy eating. When you have structured ideas for healthy meals and foods available at home to prepare  those meals, weight control becomes easier.  If only healthy foods are available at home, then you will be much more likely to eat healthy foods. And you will be less likely to go to a restaurant or  a fast food meal, which tend to be unhealthy and higher in calories than meals prepared at home.      Take 5-10 minutes each week to plan meals for the next 7 days.  Make a grocery list based on the meal plan.    Grocery Shopping Tips:  ? Shop on a full stomach.  ? Schedule your shopping for times when you are most motivated and able to be disciplined about your purchases. For example, after a stressful day at work it may be difficult to make the healthiest choices. Shopping at other times, such as early in the morning or after dinner, may be easier.  ? Focus your shopping on the outside aisles of the store, which tend to contain more fresh foods and lower calorie foods. The inside aisles tend to have more processed foods.  ? Stick to your list. Avoid buying unhealthy items just because they are on sale.   ? Compare nutrition labels to check the number of calories and percentage of fat.      What to buy:    Vegetables  - Fresh vegetables  - Frozen vegetables with no sauce or added salt  - Canned vegetables with no sauce or added salt    Protein  - Lean meats, such as chicken and turkey  - Limit red meats, such as beef to no more than 1x/week  - Limit processed meats, such as cold cuts, patel, sausage, and hot dogs. Look for brands that have no nitrites and are minimally processed. Consider turkey sausage or turkey patel.  - Fish and Shellfish  - Eggs  - Dried beans  - Canned beans (reduced sodium)    Fat  - Use healthy oils, such as olive oil or canola oil, for cooking, salad dressings, etc.  - Unflavored nuts and seeds  - Nut butters (no added sugar)    Dairy  - Yogurt (no sugar added)  - Cheese  - Low-fat milk  - Unsweetened nondairy milks (almond milk, soy milk, etc)    Fruit  - Fresh Fruit  - Frozen fruit  with no added sugar  - Canned fruit with no added sugar  - Dried fruit with no added sugar  - 100% fruit juice    Whole Grains  - Single ingredient grains, such as oats, quinoa, brown rice  - Whole-wheat pasta  - Sprouted whole-grain bread    What to avoid:  - Avoid fried foods  - Avoid foods with added sugar  - Avoid sugar-sweetened beverages  - Avoid ultra-processed foods  Lifestyle Activity    Lifestyle activity consists of all the activities that burn calories during the course of a normal day. Using the stairs, washing the dishes, or even getting up to turn off the television are all examples of lifestyle activity. All activities, no matter how small, burn calories. Increasing lifestyle activity can help with weight control, so building physical activity into your everyday routine is important.     A pedometer is a tool that can help you track how much lifestyle activity you are getting. It can help you stay active. A pedometer counts each step you take and displays your total steps on the screen. Many smartphones, including iPhones and Androids, have applications that automatically count your steps. If you decide to use this method, make sure you are holding or wearing your smartphone so it can count all of your steps.     During the next 2 weeks, aim for 5,000 or more steps per day. Each week aim to increase your daily step goal by 250 so that you will reach an average of 10,000 steps per day (equal to walking 4 to 5 miles).     Start making more active choices in your routine in order to increase the amount of walking you do each day.     Strategies to Increase Lifestyle Activity:    ? Take several 5-10-minute walks during the day.   ? Set a reminder to take a 5 minute break from your desk every hour.   ? Choose the farthest entrance to a building that you are entering.   ? Host walking meetings at work.   ? Walk down the tran to contact co-workers face-to-face, instead of emailing or calling.  ? Walk to a  restroom, water cooler, or copy machine on a different floor at work.   ? Walk during your lunch break.   ? Park farther away in parking lots.   ? Get off the bus or train earlier and walk farther to your destination.   ? Take the stairs rather than the elevator or the escalator.   ? Start a walking club with co-workers or neighbors.   ? Walk - don't drive - for trips less than one mile.   ? Take an after-dinner walk with family.   ? Go for a walk while talking on your wireless phone.   ? Walk the dog more often.   ? If you prefer to stay indoors because of the weather, try walking in a shopping mall or doing laps around a large store.   ? Purchase a treadmill to use at home.   ? Schedule time for walking every week and stick to it like any other appointment.   ? Or think of your own strategy: _______________________________     Physical Activity    Physical activity improves your health and helps with weight control, especially weight loss maintenance.      When starting to incorporate an exercise routine into your day, it is helpful to set specific goals.  For example, I will walk at moderate intensity from 12:30-12:45pm on Monday, Wednesday, and Friday.  Schedule your exercise time into your calendar.  Think of any potential barriers that may come up and prevent you from exercising.  Develop solutions or back-up plans for when these barriers may arise.    Walking is an ideal activity to start with if you do not currently have an exercise routine. You can make the activity more fun by doing it with a friend or listening to music or a book on tape while you do it. If you don't enjoy walking, think of other activities you like, such as bike riding or swimming.  Other alternatives include group fitness classes or exercise at home using DVDs, Apps, etc.    If you are new to exercising, then start with 10 minutes 3-4 days per week.  After two weeks, increase to 15 minutes 3-4 days per week.  If you already exercise  more than this, continue at your higher level, or increase by 10-15 minutes if able.         Aerobic Activity  Aerobic Activity gets you breathing harder and your heart beating faster.  Eventually, you should work up to 150 - 300 minutes of moderate-intensity aerobic activity every week or 75 - 150 minutes of vigorous-intensity aerobic activity every week.  An easy way to gauge the intensity of your activity is with the Talk Test.  During moderate activity, you should be able to talk, but not sing without having to pause for a breath.  During vigorous activity, you shouldn't be able to say more than a few words without pausing for a breath.  You should not push yourself until you are completely out of breath, tired, or sore.    Examples of Aerobic Activity:  - Walking  - Running  - Swimming  - Biking  - Playing sports like tennis or basketball  - Dancing  - Jumping Rope      Strength Training  It is also recommended that you perform muscle-strengthening activities at least 2 days per week.  Be sure to include activities that work all major muscle groups (legs, arms, abdomen, back, buttocks, chest, and shoulders)    Examples of Strength Training  - Lifting weights  - Working with resistance band  - Using your body weight for resistance (squats, push-ups, sit-ups)  - Pilates  - Yoga      https://health.gov/moveyourway/activity-planner/      Remember to keep a record of your activity to track your progress.

## 2020-09-09 NOTE — PROGRESS NOTES
Subjective:       Patient ID: Katarina Munguia is a 35 y.o. female.    Chief Complaint: weight gain    Patient presents for treatment of obesity.     Co-morbidities:   HTN  HLD  GERD  Fatty Liver    Weight History  Lowest adult weight: 200 lbs  Highest adult weight: 239 lbs     History of Weight Loss Efforts  No prior medications for weight loss    Current Physical Activity  30 minutes cardio, 30 minutes weights 3x/week    Current Eating Habits  Breakfast - premier protein shake, advocado toast, egg whites, turkey sausage  Lunch - salad, left overs, pizza, prepared meals, smoothies  Dinner - sushi, grilled salmon, ground turkey  Snacks - nuts, beef jerky, fruit bar  Beverages - water    Alcohol: 1-2 dinks/week    Tobacco: no     Sleep: 7-8 hours/night      Review of Systems   Constitutional: Negative for chills and fever.   HENT: Negative for sore throat and trouble swallowing.    Eyes: Negative for visual disturbance.        Negative for glaucoma; last eye exam 3 weeks ago   Respiratory: Negative for cough and shortness of breath.    Cardiovascular: Negative for chest pain, palpitations and leg swelling.   Gastrointestinal: Positive for reflux. Negative for abdominal pain, constipation, diarrhea, nausea and vomiting.   Endocrine: Negative for cold intolerance and heat intolerance.        Negative for thyroid cancer     Genitourinary: Negative for difficulty urinating.        Negative for kidney stones; on OCP for birth control   Musculoskeletal: Negative for arthralgias and back pain.   Integumentary:  Negative for rash.   Allergic/Immunologic: Negative for immunocompromised state.   Neurological: Negative for dizziness, seizures, light-headedness and headaches.   Hematological: Does not bruise/bleed easily.   Psychiatric/Behavioral: The patient is not nervous/anxious.          Objective:      Ref. Range 7/22/2020 08:18   Sodium Latest Ref Range: 136 - 145 mmol/L 139   Potassium Latest Ref Range: 3.5 - 5.1 mmol/L  3.8   Chloride Latest Ref Range: 95 - 110 mmol/L 106   CO2 Latest Ref Range: 23 - 29 mmol/L 25   Anion Gap Latest Ref Range: 8 - 16 mmol/L 8   BUN, Bld Latest Ref Range: 6 - 20 mg/dL 13   Creatinine Latest Ref Range: 0.5 - 1.4 mg/dL 0.7   eGFR if non African American Latest Ref Range: >60 mL/min/1.73 m^2 >60   eGFR if  Latest Ref Range: >60 mL/min/1.73 m^2 >60   Glucose Latest Ref Range: 70 - 110 mg/dL 106   Calcium Latest Ref Range: 8.7 - 10.5 mg/dL 8.8   Alkaline Phosphatase Latest Ref Range: 55 - 135 U/L 38 (L)   PROTEIN TOTAL Latest Ref Range: 6.0 - 8.4 g/dL 7.0   Albumin Latest Ref Range: 3.5 - 5.2 g/dL 4.0   BILIRUBIN TOTAL Latest Ref Range: 0.1 - 1.0 mg/dL 0.6   AST Latest Ref Range: 10 - 40 U/L 16   ALT Latest Ref Range: 10 - 44 U/L 25   Triglycerides Latest Ref Range: 30 - 150 mg/dL 181 (H)   Cholesterol Latest Ref Range: 120 - 199 mg/dL 270 (H)   HDL Latest Ref Range: 40 - 75 mg/dL 53   Hdl/Cholesterol Ratio Latest Ref Range: 20.0 - 50.0 % 19.6 (L)   LDL Cholesterol External Latest Ref Range: 63.0 - 159.0 mg/dL 180.8 (H)   Non-HDL Cholesterol Latest Units: mg/dL 217   Total Cholesterol/HDL Ratio Latest Ref Range: 2.0 - 5.0  5.1 (H)      Ref. Range 11/19/2019 09:04   Hemoglobin A1C External Latest Ref Range: 4.0 - 5.6 % 5.0   Estimated Avg Glucose Latest Ref Range: 68 - 131 mg/dL 97     Weight:239.3 lbs  BMI 39.8  BFP 49.3%  SMM 66.8 lbs  BMR 1559 kcal    Vitals:    09/09/20 0904   BP: (!) 130/90   Pulse: 81       Physical Exam  Vitals signs reviewed.   Constitutional:       General: She is not in acute distress.     Appearance: Normal appearance. She is obese. She is not ill-appearing, toxic-appearing or diaphoretic.   HENT:      Head: Normocephalic and atraumatic.   Eyes:      Extraocular Movements: Extraocular movements intact.   Neck:      Musculoskeletal: Normal range of motion and neck supple.   Cardiovascular:      Rate and Rhythm: Normal rate and regular rhythm.   Pulmonary:       Effort: Pulmonary effort is normal. No respiratory distress.      Breath sounds: Normal breath sounds.   Abdominal:      Palpations: Abdomen is soft.      Tenderness: There is no abdominal tenderness.   Musculoskeletal: Normal range of motion.      Right lower leg: No edema.      Left lower leg: No edema.   Skin:     General: Skin is warm and dry.   Neurological:      General: No focal deficit present.      Mental Status: She is alert and oriented to person, place, and time.      Gait: Gait normal.   Psychiatric:         Mood and Affect: Mood normal.         Behavior: Behavior normal.         Thought Content: Thought content normal.         Judgment: Judgment normal.         Assessment:       1. Class 2 severe obesity due to excess calories with serious comorbidity and body mass index (BMI) of 39.0 to 39.9 in adult    2. Hyperlipidemia, unspecified hyperlipidemia type    3. Essential hypertension    4. Fatty liver    5. Gastroesophageal reflux disease without esophagitis        Plan:   - Ozempic 0.25 mg weekly injections    - Log all food and beverage intake with a daily calorie goal of 1200 calories per day    - Moderate intensity aerobic exercise for 30 minutes 3x/week; strength training for 30 minutes 2-3x/week    - Check in via [id number] with updated weight in 1 month; Return to clinic in 3 months

## 2020-10-01 ENCOUNTER — PATIENT OUTREACH (OUTPATIENT)
Dept: OTHER | Facility: OTHER | Age: 35
End: 2020-10-01

## 2020-10-02 ENCOUNTER — PATIENT MESSAGE (OUTPATIENT)
Dept: BARIATRICS | Facility: CLINIC | Age: 35
End: 2020-10-02

## 2020-10-02 DIAGNOSIS — K76.0 FATTY LIVER: ICD-10-CM

## 2020-10-02 DIAGNOSIS — E66.01 CLASS 2 SEVERE OBESITY DUE TO EXCESS CALORIES WITH SERIOUS COMORBIDITY AND BODY MASS INDEX (BMI) OF 39.0 TO 39.9 IN ADULT: Primary | ICD-10-CM

## 2020-10-02 RX ORDER — SEMAGLUTIDE 1.34 MG/ML
0.5 INJECTION, SOLUTION SUBCUTANEOUS
Qty: 3 ML | Refills: 0 | Status: SHIPPED | OUTPATIENT
Start: 2020-10-02 | End: 2020-11-10 | Stop reason: SDUPTHER

## 2020-10-05 ENCOUNTER — PATIENT OUTREACH (OUTPATIENT)
Dept: OTHER | Facility: OTHER | Age: 35
End: 2020-10-05

## 2020-10-09 ENCOUNTER — PATIENT MESSAGE (OUTPATIENT)
Dept: ADMINISTRATIVE | Facility: OTHER | Age: 35
End: 2020-10-09

## 2020-11-02 ENCOUNTER — PATIENT MESSAGE (OUTPATIENT)
Dept: BARIATRICS | Facility: CLINIC | Age: 35
End: 2020-11-02

## 2020-11-02 ENCOUNTER — TELEPHONE (OUTPATIENT)
Dept: SURGERY | Facility: CLINIC | Age: 35
End: 2020-11-02

## 2020-11-06 DIAGNOSIS — J06.9 UPPER RESPIRATORY TRACT INFECTION, UNSPECIFIED TYPE: ICD-10-CM

## 2020-11-06 RX ORDER — FLUTICASONE PROPIONATE 50 MCG
1 SPRAY, SUSPENSION (ML) NASAL 2 TIMES DAILY
Qty: 16 ML | Refills: 1 | Status: SHIPPED | OUTPATIENT
Start: 2020-11-06 | End: 2021-01-04

## 2020-11-09 ENCOUNTER — PATIENT MESSAGE (OUTPATIENT)
Dept: BARIATRICS | Facility: CLINIC | Age: 35
End: 2020-11-09

## 2020-11-09 ENCOUNTER — TELEPHONE (OUTPATIENT)
Dept: GENETICS | Facility: CLINIC | Age: 35
End: 2020-11-09

## 2020-11-09 NOTE — PROGRESS NOTES
Digital Medicine: Clinician Follow-Up    Called patient to discuss increasing her readings.    The history is provided by the patient.      Review of patient's allergies indicates:  No Known Allergies  Follow-up reason(s): routine follow up.     Hypertension    Readings are trending down   Patient is not experiencing signs/symptoms of hypotension.  Patient is not experiencing signs/symptoms of hypertension.            Last 5 Patient Entered Readings                                      Current 30 Day Average: 115/75     Recent Readings 11/2/2020 9/29/2020 9/28/2020 9/21/2020 9/16/2020    SBP (mmHg) 115 132 129 131 133    DBP (mmHg) 75 89 87 81 88    Pulse 85 82 83 75 90                 Depression Screening  Did not address depression screening.    Sleep Apnea Screening    Did not address sleep apnea screening.     Medication Affordability Screening  Did not address medication affordability screening.     Medication Adherence-Medication adherence was assessed.          ASSESSMENT(S)  Patients BP average is 115/75 mmHg, which is at goal. Patient's BP goal is less than or equal to 130/80.    Discussed her recent elevated readings and most recent at goal reading. More readings are needed to determine a trend.       Hypertension Plan  Additional monitoring needed.  Continue current therapy.       Addressed patient questions and patient has my contact information if needed prior to next outreach. Patient verbalizes understanding.             There are no preventive care reminders to display for this patient.  There are no preventive care reminders to display for this patient.      Hypertension Medications             amLODIPine (NORVASC) 5 MG tablet Take 1 tablet (5 mg total) by mouth once daily.    chlorthalidone (HYGROTEN) 25 MG Tab Take 1 tablet (25 mg total) by mouth once daily.

## 2020-11-10 NOTE — TELEPHONE ENCOUNTER
Phoned [employer] in regards to completeing PA for  Efrem. Was informed member ID number is [id number].

## 2020-11-13 ENCOUNTER — TELEPHONE (OUTPATIENT)
Dept: BARIATRICS | Facility: CLINIC | Age: 35
End: 2020-11-13

## 2020-11-13 ENCOUNTER — IMMUNIZATION (OUTPATIENT)
Dept: PHARMACY | Facility: CLINIC | Age: 35
End: 2020-11-13
Payer: COMMERCIAL

## 2020-11-13 ENCOUNTER — IMMUNIZATION (OUTPATIENT)
Dept: PHARMACY | Facility: CLINIC | Age: 35
End: 2020-11-13

## 2020-11-13 ENCOUNTER — PATIENT MESSAGE (OUTPATIENT)
Dept: BARIATRICS | Facility: CLINIC | Age: 35
End: 2020-11-13

## 2020-11-13 NOTE — TELEPHONE ENCOUNTER
Sent Medication Prior authorization request form via fax to [employer]. Attached clinical notes regarding diagnosis and pt medicine history to help support case.     [employer] fax number- [phone]  [email or web address] phone number- [phone]

## 2020-11-13 NOTE — TELEPHONE ENCOUNTER
Received notice from [employer] in regards to (Ozempic) rx drug request . Notice reads - The request has been closed because this drug does not require a Prior Authorization . No further action will be taken at this time.    Phoned pharmacy in regards to (Ozempic) status. pharmacist stated pt picked up medication on today 11/13/2020 with copay of $24.99.

## 2020-11-30 NOTE — PROGRESS NOTES
Digital Medicine: Health  Follow-Up    The history is provided by the patient.             Reason for review: Blood pressure at goal        Topics Covered on Call: physical activity and Diet    Additional Follow-up details: Checked in with patient via [employer]. Her BP readings have been at goal lately which she attributes to increased exercise and weight loss. She is working with bariatrics and has lost 21 lbs. Provided congratulations.     Provided encouragement to continue with lifestyle modifications and continue checking BP frequently.             Diet-Change      Dietary Improvements:Patient reports diet improvements.              Physical Activity-Change  6 day(s) a week.   Medication Adherence-Medication Adherence not addressed.      Substance, Sleep, Stress-Not assessed      Additional monitoring needed.  Continue current diet/physical activity routine.       Addressed patient questions and patient has my contact information if needed prior to next outreach. Patient verbalizes understanding.      Explained the importance of self-monitoring and medication adherence. Encouraged the patient to communicate with their health  for lifestyle modifications to help improve or maintain a healthy lifestyle.               There are no preventive care reminders to display for this patient.      Last 5 Patient Entered Readings                                      Current 30 Day Average: 113/77     Recent Readings 11/30/2020 11/28/2020 11/27/2020 11/25/2020 11/25/2020    SBP (mmHg) 112 125 107 101 106    DBP (mmHg) 78 79 87 72 69    Pulse 79 105 98 70 66

## 2020-12-09 ENCOUNTER — OFFICE VISIT (OUTPATIENT)
Dept: BARIATRICS | Facility: CLINIC | Age: 35
End: 2020-12-09
Payer: COMMERCIAL

## 2020-12-09 VITALS
HEIGHT: 65 IN | OXYGEN SATURATION: 98 % | WEIGHT: 219.69 LBS | BODY MASS INDEX: 36.6 KG/M2 | DIASTOLIC BLOOD PRESSURE: 82 MMHG | HEART RATE: 70 BPM | SYSTOLIC BLOOD PRESSURE: 110 MMHG

## 2020-12-09 DIAGNOSIS — E78.5 HYPERLIPIDEMIA, UNSPECIFIED HYPERLIPIDEMIA TYPE: ICD-10-CM

## 2020-12-09 DIAGNOSIS — E66.01 CLASS 2 SEVERE OBESITY DUE TO EXCESS CALORIES WITH SERIOUS COMORBIDITY AND BODY MASS INDEX (BMI) OF 39.0 TO 39.9 IN ADULT: Primary | ICD-10-CM

## 2020-12-09 DIAGNOSIS — I10 ESSENTIAL HYPERTENSION: ICD-10-CM

## 2020-12-09 DIAGNOSIS — K21.9 GASTROESOPHAGEAL REFLUX DISEASE WITHOUT ESOPHAGITIS: ICD-10-CM

## 2020-12-09 DIAGNOSIS — K76.0 FATTY LIVER: ICD-10-CM

## 2020-12-09 PROCEDURE — 3074F SYST BP LT 130 MM HG: CPT | Mod: CPTII,S$GLB,, | Performed by: STUDENT IN AN ORGANIZED HEALTH CARE EDUCATION/TRAINING PROGRAM

## 2020-12-09 PROCEDURE — 3008F BODY MASS INDEX DOCD: CPT | Mod: CPTII,S$GLB,, | Performed by: STUDENT IN AN ORGANIZED HEALTH CARE EDUCATION/TRAINING PROGRAM

## 2020-12-09 PROCEDURE — 99213 OFFICE O/P EST LOW 20 MIN: CPT | Mod: S$GLB,,, | Performed by: STUDENT IN AN ORGANIZED HEALTH CARE EDUCATION/TRAINING PROGRAM

## 2020-12-09 PROCEDURE — 3074F PR MOST RECENT SYSTOLIC BLOOD PRESSURE < 130 MM HG: ICD-10-PCS | Mod: CPTII,S$GLB,, | Performed by: STUDENT IN AN ORGANIZED HEALTH CARE EDUCATION/TRAINING PROGRAM

## 2020-12-09 PROCEDURE — 99999 PR PBB SHADOW E&M-EST. PATIENT-LVL IV: CPT | Mod: PBBFAC,,, | Performed by: STUDENT IN AN ORGANIZED HEALTH CARE EDUCATION/TRAINING PROGRAM

## 2020-12-09 PROCEDURE — 3079F DIAST BP 80-89 MM HG: CPT | Mod: CPTII,S$GLB,, | Performed by: STUDENT IN AN ORGANIZED HEALTH CARE EDUCATION/TRAINING PROGRAM

## 2020-12-09 PROCEDURE — 1126F AMNT PAIN NOTED NONE PRSNT: CPT | Mod: S$GLB,,, | Performed by: STUDENT IN AN ORGANIZED HEALTH CARE EDUCATION/TRAINING PROGRAM

## 2020-12-09 PROCEDURE — 99213 PR OFFICE/OUTPT VISIT, EST, LEVL III, 20-29 MIN: ICD-10-PCS | Mod: S$GLB,,, | Performed by: STUDENT IN AN ORGANIZED HEALTH CARE EDUCATION/TRAINING PROGRAM

## 2020-12-09 PROCEDURE — 3079F PR MOST RECENT DIASTOLIC BLOOD PRESSURE 80-89 MM HG: ICD-10-PCS | Mod: CPTII,S$GLB,, | Performed by: STUDENT IN AN ORGANIZED HEALTH CARE EDUCATION/TRAINING PROGRAM

## 2020-12-09 PROCEDURE — 99999 PR PBB SHADOW E&M-EST. PATIENT-LVL IV: ICD-10-PCS | Mod: PBBFAC,,, | Performed by: STUDENT IN AN ORGANIZED HEALTH CARE EDUCATION/TRAINING PROGRAM

## 2020-12-09 PROCEDURE — 3008F PR BODY MASS INDEX (BMI) DOCUMENTED: ICD-10-PCS | Mod: CPTII,S$GLB,, | Performed by: STUDENT IN AN ORGANIZED HEALTH CARE EDUCATION/TRAINING PROGRAM

## 2020-12-09 PROCEDURE — 1126F PR PAIN SEVERITY QUANTIFIED, NO PAIN PRESENT: ICD-10-PCS | Mod: S$GLB,,, | Performed by: STUDENT IN AN ORGANIZED HEALTH CARE EDUCATION/TRAINING PROGRAM

## 2020-12-09 RX ORDER — SEMAGLUTIDE 1.34 MG/ML
0.5 INJECTION, SOLUTION SUBCUTANEOUS
Qty: 4.5 ML | Refills: 0 | Status: SHIPPED | OUTPATIENT
Start: 2020-12-09 | End: 2021-02-25

## 2020-12-09 RX ORDER — TRAMADOL HYDROCHLORIDE 50 MG/1
TABLET ORAL
COMMUNITY
Start: 2020-11-23 | End: 2021-06-21

## 2020-12-09 RX ORDER — PHENTERMINE HYDROCHLORIDE 15 MG/1
15 CAPSULE ORAL EVERY MORNING
Qty: 30 CAPSULE | Refills: 0 | Status: SHIPPED | OUTPATIENT
Start: 2020-12-09 | End: 2021-01-08

## 2020-12-09 NOTE — PROGRESS NOTES
Subjective:       Patient ID: Katarina Munguia is a 35 y.o. female.    Chief Complaint: No chief complaint on file.    Patient presents for treatment of obesity.   Follow-up, appointment #2     Co-morbidities:   HTN  HLD  GERD  Fatty Liver    Diet Recall:  Portion control  [employer] premade meals  Logging food     Physical Activity: 6-7 days/week, walking 4-5 miles, weights    Medical Weight Loss History  9/9/2020: 239.3 lbs, BMI 39.8, BFP 49.3%, SMM 66.8 lbs; Ozempic 0.25 weekly injections  12/9/2020: 219.7 lbs, BMI 36.56, BFP 49.6%, SMM 61.5 lbs; Ozempic 0.5 mg weekly injections, phentermine 8mg 1-2 tablets daily    Review of Systems   Constitutional: Negative for chills and fever.   HENT: Negative for sore throat and trouble swallowing.    Eyes: Negative for visual disturbance.   Respiratory: Negative for shortness of breath.    Cardiovascular: Negative for chest pain and palpitations.   Gastrointestinal: Negative for abdominal pain, nausea and vomiting.   Integumentary:  Negative for rash.   Neurological: Negative for dizziness and light-headedness.   Psychiatric/Behavioral: The patient is not nervous/anxious.          Objective:       Weight 219.7 lbs  BMI 36.56  BFP 49.6%  SMM 61.5 lbs    Vitals:    12/09/20 0803   BP: 110/82   Pulse: 70       Physical Exam  Vitals signs reviewed.   Constitutional:       General: She is not in acute distress.     Appearance: Normal appearance. She is obese. She is not ill-appearing, toxic-appearing or diaphoretic.   HENT:      Head: Normocephalic and atraumatic.   Eyes:      Extraocular Movements: Extraocular movements intact.   Neck:      Musculoskeletal: Normal range of motion.   Cardiovascular:      Rate and Rhythm: Normal rate.   Pulmonary:      Effort: Pulmonary effort is normal. No respiratory distress.   Musculoskeletal: Normal range of motion.      Right lower leg: No edema.      Left lower leg: No edema.   Skin:     General: Skin is warm and dry.   Neurological:       General: No focal deficit present.      Mental Status: She is alert and oriented to person, place, and time.      Gait: Gait normal.   Psychiatric:         Mood and Affect: Mood normal.         Behavior: Behavior normal.         Thought Content: Thought content normal.         Judgment: Judgment normal.         Assessment:       1. Class 2 severe obesity due to excess calories with serious comorbidity and body mass index (BMI) of 39.0 to 39.9 in adult    2. Fatty liver    3. Essential hypertension    4. Hyperlipidemia, unspecified hyperlipidemia type    5. Gastroesophageal reflux disease without esophagitis        Plan:   - Continue Ozempic 0.5 mg weekly injections    - Start phentermine 8 mg 1-2 tablets daily    - Log all food and beverage intake with a daily calorie goal of 9524-3066 calories per day    - Moderate intensity aerobic exercise for 30-60 minutes 5-6x/week    - Return to clinic in 4 weeks

## 2021-01-11 ENCOUNTER — PATIENT MESSAGE (OUTPATIENT)
Dept: BARIATRICS | Facility: CLINIC | Age: 36
End: 2021-01-11

## 2021-01-11 DIAGNOSIS — E66.01 CLASS 2 SEVERE OBESITY DUE TO EXCESS CALORIES WITH SERIOUS COMORBIDITY AND BODY MASS INDEX (BMI) OF 39.0 TO 39.9 IN ADULT: Primary | ICD-10-CM

## 2021-01-12 RX ORDER — PHENTERMINE HYDROCHLORIDE 15 MG/1
15 CAPSULE ORAL EVERY MORNING
Qty: 30 CAPSULE | Refills: 0 | Status: SHIPPED | OUTPATIENT
Start: 2021-01-12 | End: 2021-02-11

## 2021-02-17 ENCOUNTER — PATIENT MESSAGE (OUTPATIENT)
Dept: BARIATRICS | Facility: CLINIC | Age: 36
End: 2021-02-17

## 2021-03-17 ENCOUNTER — OFFICE VISIT (OUTPATIENT)
Dept: BARIATRICS | Facility: CLINIC | Age: 36
End: 2021-03-17
Payer: COMMERCIAL

## 2021-03-17 VITALS
BODY MASS INDEX: 35.13 KG/M2 | SYSTOLIC BLOOD PRESSURE: 94 MMHG | OXYGEN SATURATION: 98 % | WEIGHT: 210.88 LBS | HEIGHT: 65 IN | HEART RATE: 104 BPM | DIASTOLIC BLOOD PRESSURE: 68 MMHG

## 2021-03-17 DIAGNOSIS — I10 ESSENTIAL HYPERTENSION: ICD-10-CM

## 2021-03-17 DIAGNOSIS — E66.01 CLASS 2 SEVERE OBESITY DUE TO EXCESS CALORIES WITH SERIOUS COMORBIDITY AND BODY MASS INDEX (BMI) OF 39.0 TO 39.9 IN ADULT: Primary | ICD-10-CM

## 2021-03-17 DIAGNOSIS — E78.5 HYPERLIPIDEMIA, UNSPECIFIED HYPERLIPIDEMIA TYPE: ICD-10-CM

## 2021-03-17 DIAGNOSIS — K76.0 FATTY LIVER: ICD-10-CM

## 2021-03-17 PROCEDURE — 1126F PR PAIN SEVERITY QUANTIFIED, NO PAIN PRESENT: ICD-10-PCS | Mod: S$GLB,,, | Performed by: STUDENT IN AN ORGANIZED HEALTH CARE EDUCATION/TRAINING PROGRAM

## 2021-03-17 PROCEDURE — 3078F PR MOST RECENT DIASTOLIC BLOOD PRESSURE < 80 MM HG: ICD-10-PCS | Mod: CPTII,S$GLB,, | Performed by: STUDENT IN AN ORGANIZED HEALTH CARE EDUCATION/TRAINING PROGRAM

## 2021-03-17 PROCEDURE — 99213 OFFICE O/P EST LOW 20 MIN: CPT | Mod: S$GLB,,, | Performed by: STUDENT IN AN ORGANIZED HEALTH CARE EDUCATION/TRAINING PROGRAM

## 2021-03-17 PROCEDURE — 3008F BODY MASS INDEX DOCD: CPT | Mod: CPTII,S$GLB,, | Performed by: STUDENT IN AN ORGANIZED HEALTH CARE EDUCATION/TRAINING PROGRAM

## 2021-03-17 PROCEDURE — 99999 PR PBB SHADOW E&M-EST. PATIENT-LVL III: ICD-10-PCS | Mod: PBBFAC,,, | Performed by: STUDENT IN AN ORGANIZED HEALTH CARE EDUCATION/TRAINING PROGRAM

## 2021-03-17 PROCEDURE — 3074F PR MOST RECENT SYSTOLIC BLOOD PRESSURE < 130 MM HG: ICD-10-PCS | Mod: CPTII,S$GLB,, | Performed by: STUDENT IN AN ORGANIZED HEALTH CARE EDUCATION/TRAINING PROGRAM

## 2021-03-17 PROCEDURE — 1126F AMNT PAIN NOTED NONE PRSNT: CPT | Mod: S$GLB,,, | Performed by: STUDENT IN AN ORGANIZED HEALTH CARE EDUCATION/TRAINING PROGRAM

## 2021-03-17 PROCEDURE — 3008F PR BODY MASS INDEX (BMI) DOCUMENTED: ICD-10-PCS | Mod: CPTII,S$GLB,, | Performed by: STUDENT IN AN ORGANIZED HEALTH CARE EDUCATION/TRAINING PROGRAM

## 2021-03-17 PROCEDURE — 3074F SYST BP LT 130 MM HG: CPT | Mod: CPTII,S$GLB,, | Performed by: STUDENT IN AN ORGANIZED HEALTH CARE EDUCATION/TRAINING PROGRAM

## 2021-03-17 PROCEDURE — 3078F DIAST BP <80 MM HG: CPT | Mod: CPTII,S$GLB,, | Performed by: STUDENT IN AN ORGANIZED HEALTH CARE EDUCATION/TRAINING PROGRAM

## 2021-03-17 PROCEDURE — 99213 PR OFFICE/OUTPT VISIT, EST, LEVL III, 20-29 MIN: ICD-10-PCS | Mod: S$GLB,,, | Performed by: STUDENT IN AN ORGANIZED HEALTH CARE EDUCATION/TRAINING PROGRAM

## 2021-03-17 PROCEDURE — 99999 PR PBB SHADOW E&M-EST. PATIENT-LVL III: CPT | Mod: PBBFAC,,, | Performed by: STUDENT IN AN ORGANIZED HEALTH CARE EDUCATION/TRAINING PROGRAM

## 2021-03-17 RX ORDER — SEMAGLUTIDE 1.34 MG/ML
0.75 INJECTION, SOLUTION SUBCUTANEOUS
Qty: 9 ML | Refills: 0 | Status: SHIPPED | OUTPATIENT
Start: 2021-03-17 | End: 2021-06-15

## 2021-03-19 ENCOUNTER — PATIENT MESSAGE (OUTPATIENT)
Dept: BARIATRICS | Facility: CLINIC | Age: 36
End: 2021-03-19

## 2021-04-11 ENCOUNTER — HOSPITAL ENCOUNTER (EMERGENCY)
Facility: HOSPITAL | Age: 36
Discharge: HOME | End: 2021-04-11
Attending: EMERGENCY MEDICINE
Payer: COMMERCIAL

## 2021-04-11 VITALS
SYSTOLIC BLOOD PRESSURE: 169 MMHG | RESPIRATION RATE: 17 BRPM | TEMPERATURE: 98 F | HEART RATE: 73 BPM | BODY MASS INDEX: 34.99 KG/M2 | OXYGEN SATURATION: 96 % | HEIGHT: 65 IN | DIASTOLIC BLOOD PRESSURE: 105 MMHG | WEIGHT: 210 LBS
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
## 2021-04-11 DIAGNOSIS — S61.451A DOG BITE OF RIGHT HAND, INITIAL ENCOUNTER: Primary | ICD-10-CM
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
## 2021-04-11 PROCEDURE — 99284 EMERGENCY DEPT VISIT MOD MDM: CPT | Mod: 25,,, | Performed by: EMERGENCY MEDICINE

## 2021-04-11 PROCEDURE — 12001 PR RESUPERF WND BODY <2.5CM: ICD-10-PCS | Mod: RT,,, | Performed by: EMERGENCY MEDICINE

## 2021-04-11 PROCEDURE — 12001 RPR S/N/AX/GEN/TRNK 2.5CM/<: CPT

## 2021-04-11 PROCEDURE — 90715 TDAP VACCINE 7 YRS/> IM: CPT | Performed by: EMERGENCY MEDICINE

## 2021-04-11 PROCEDURE — 99284 PR EMERGENCY DEPT VISIT,LEVEL IV: ICD-10-PCS | Mod: 25,,, | Performed by: EMERGENCY MEDICINE

## 2021-04-11 PROCEDURE — 90471 IMMUNIZATION ADMIN: CPT | Performed by: EMERGENCY MEDICINE

## 2021-04-11 PROCEDURE — 63600175 PHARM REV CODE 636 W HCPCS: Performed by: EMERGENCY MEDICINE

## 2021-04-11 PROCEDURE — 99284 EMERGENCY DEPT VISIT MOD MDM: CPT | Mod: 25

## 2021-04-11 PROCEDURE — 25000003 PHARM REV CODE 250: Performed by: EMERGENCY MEDICINE

## 2021-04-11 PROCEDURE — 12001 RPR S/N/AX/GEN/TRNK 2.5CM/<: CPT | Mod: RT,,, | Performed by: EMERGENCY MEDICINE

## 2021-04-11 PROCEDURE — 29125 APPL SHORT ARM SPLINT STATIC: CPT | Mod: RT

## 2021-04-11 RX ORDER — AMOXICILLIN AND CLAVULANATE POTASSIUM 875; 125 MG/1; MG/1
1 TABLET, FILM COATED ORAL
Status: COMPLETED | OUTPATIENT
Start: 2021-04-11 | End: 2021-04-11

## 2021-04-11 RX ORDER — LIDOCAINE AND PRILOCAINE 25; 25 MG/G; MG/G
CREAM TOPICAL ONCE
Status: COMPLETED | OUTPATIENT
Start: 2021-04-11 | End: 2021-04-11

## 2021-04-11 RX ORDER — IBUPROFEN 800 MG/1
800 TABLET ORAL EVERY 8 HOURS PRN
Qty: 15 TABLET | Refills: 0 | Status: SHIPPED | OUTPATIENT
Start: 2021-04-11 | End: 2021-04-16

## 2021-04-11 RX ORDER — IBUPROFEN 400 MG/1
800 TABLET ORAL
Status: COMPLETED | OUTPATIENT
Start: 2021-04-11 | End: 2021-04-11

## 2021-04-11 RX ORDER — AMOXICILLIN AND CLAVULANATE POTASSIUM 875; 125 MG/1; MG/1
1 TABLET, FILM COATED ORAL 2 TIMES DAILY
Qty: 14 TABLET | Refills: 0 | Status: SHIPPED | OUTPATIENT
Start: 2021-04-11 | End: 2021-07-21

## 2021-04-11 RX ORDER — LIDOCAINE HYDROCHLORIDE 10 MG/ML
5 INJECTION, SOLUTION EPIDURAL; INFILTRATION; INTRACAUDAL; PERINEURAL
Status: COMPLETED | OUTPATIENT
Start: 2021-04-11 | End: 2021-04-11

## 2021-04-11 RX ADMIN — NEOMYCIN AND POLYMYXIN B SULFATES AND BACITRACIN ZINC: 400; 3.5; 5 OINTMENT TOPICAL at 02:04

## 2021-04-11 RX ADMIN — AMOXICILLIN AND CLAVULANATE POTASSIUM 1 TABLET: 875; 125 TABLET, FILM COATED ORAL at 01:04

## 2021-04-11 RX ADMIN — LIDOCAINE AND PRILOCAINE: 25; 25 CREAM TOPICAL at 01:04

## 2021-04-11 RX ADMIN — CLOSTRIDIUM TETANI TOXOID ANTIGEN (FORMALDEHYDE INACTIVATED), CORYNEBACTERIUM DIPHTHERIAE TOXOID ANTIGEN (FORMALDEHYDE INACTIVATED), BORDETELLA PERTUSSIS TOXOID ANTIGEN (GLUTARALDEHYDE INACTIVATED), BORDETELLA PERTUSSIS FILAMENTOUS HEMAGGLUTININ ANTIGEN (FORMALDEHYDE INACTIVATED), BORDETELLA PERTUSSIS PERTACTIN ANTIGEN, AND BORDETELLA PERTUSSIS FIMBRIAE 2/3 ANTIGEN 0.5 ML: 5; 2; 2.5; 5; 3; 5 INJECTION, SUSPENSION INTRAMUSCULAR at 01:04

## 2021-04-11 RX ADMIN — LIDOCAINE HYDROCHLORIDE 50 MG: 10 INJECTION, SOLUTION EPIDURAL; INFILTRATION; INTRACAUDAL at 02:04

## 2021-04-11 RX ADMIN — IBUPROFEN 800 MG: 400 TABLET, FILM COATED ORAL at 01:04

## 2021-04-13 ENCOUNTER — TELEPHONE (OUTPATIENT)
Dept: INTERNAL MEDICINE | Facility: CLINIC | Age: 36
End: 2021-04-13

## 2021-04-15 ENCOUNTER — OFFICE VISIT (OUTPATIENT)
Dept: INTERNAL MEDICINE | Facility: CLINIC | Age: 36
End: 2021-04-15
Payer: COMMERCIAL

## 2021-04-15 VITALS
HEIGHT: 65 IN | DIASTOLIC BLOOD PRESSURE: 80 MMHG | BODY MASS INDEX: 36.58 KG/M2 | SYSTOLIC BLOOD PRESSURE: 115 MMHG | WEIGHT: 219.56 LBS

## 2021-04-15 DIAGNOSIS — I10 ESSENTIAL HYPERTENSION: ICD-10-CM
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
## 2021-04-15 DIAGNOSIS — S61.451D DOG BITE OF RIGHT HAND, SUBSEQUENT ENCOUNTER: Primary | ICD-10-CM

## 2021-04-15 PROCEDURE — 99999 PR PBB SHADOW E&M-EST. PATIENT-LVL III: ICD-10-PCS | Mod: PBBFAC,,, | Performed by: NURSE PRACTITIONER

## 2021-04-15 PROCEDURE — 1125F PR PAIN SEVERITY QUANTIFIED, PAIN PRESENT: ICD-10-PCS | Mod: S$GLB,,, | Performed by: NURSE PRACTITIONER

## 2021-04-15 PROCEDURE — 99213 PR OFFICE/OUTPT VISIT, EST, LEVL III, 20-29 MIN: ICD-10-PCS | Mod: S$GLB,,, | Performed by: NURSE PRACTITIONER

## 2021-04-15 PROCEDURE — 99213 OFFICE O/P EST LOW 20 MIN: CPT | Mod: S$GLB,,, | Performed by: NURSE PRACTITIONER

## 2021-04-15 PROCEDURE — 3008F PR BODY MASS INDEX (BMI) DOCUMENTED: ICD-10-PCS | Mod: CPTII,S$GLB,, | Performed by: NURSE PRACTITIONER

## 2021-04-15 PROCEDURE — 99999 PR PBB SHADOW E&M-EST. PATIENT-LVL III: CPT | Mod: PBBFAC,,, | Performed by: NURSE PRACTITIONER

## 2021-04-15 PROCEDURE — 3008F BODY MASS INDEX DOCD: CPT | Mod: CPTII,S$GLB,, | Performed by: NURSE PRACTITIONER

## 2021-04-15 PROCEDURE — 1125F AMNT PAIN NOTED PAIN PRSNT: CPT | Mod: S$GLB,,, | Performed by: NURSE PRACTITIONER

## 2021-04-15 RX ORDER — MUPIROCIN 20 MG/G
OINTMENT TOPICAL 2 TIMES DAILY
Qty: 15 G | Refills: 0 | Status: SHIPPED | OUTPATIENT
Start: 2021-04-15 | End: 2022-03-17

## 2021-05-25 ENCOUNTER — TELEPHONE (OUTPATIENT)
Dept: DERMATOLOGY | Facility: CLINIC | Age: 36
End: 2021-05-25

## 2021-06-21 ENCOUNTER — PATIENT MESSAGE (OUTPATIENT)
Dept: BARIATRICS | Facility: CLINIC | Age: 36
End: 2021-06-21

## 2021-06-21 ENCOUNTER — PROCEDURE VISIT (OUTPATIENT)
Dept: DERMATOLOGY | Facility: CLINIC | Age: 36
End: 2021-06-21
Payer: COMMERCIAL

## 2021-06-21 VITALS
DIASTOLIC BLOOD PRESSURE: 80 MMHG | HEIGHT: 65 IN | WEIGHT: 219 LBS | BODY MASS INDEX: 36.49 KG/M2 | HEART RATE: 75 BPM | SYSTOLIC BLOOD PRESSURE: 124 MMHG

## 2021-06-21 DIAGNOSIS — D22.61 ATYPICAL NEVUS OF RIGHT UPPER ARM: Primary | ICD-10-CM

## 2021-06-21 PROCEDURE — 88342 IMHCHEM/IMCYTCHM 1ST ANTB: CPT | Mod: 26,,, | Performed by: PATHOLOGY

## 2021-06-21 PROCEDURE — 88305 TISSUE EXAM BY PATHOLOGIST: CPT | Mod: 26,,, | Performed by: PATHOLOGY

## 2021-06-21 PROCEDURE — 88342 CHG IMMUNOCYTOCHEMISTRY: ICD-10-PCS | Mod: 26,,, | Performed by: PATHOLOGY

## 2021-06-21 PROCEDURE — 88342 IMHCHEM/IMCYTCHM 1ST ANTB: CPT | Performed by: PATHOLOGY

## 2021-06-21 PROCEDURE — 11402 PR EXC SKIN BENIG 1.1-2 CM TRUNK,ARM,LEG: ICD-10-PCS | Mod: 51,S$GLB,, | Performed by: DERMATOLOGY

## 2021-06-21 PROCEDURE — 13121 PR RECMPL WND SCALP,EXTR 2.6-7.5 CM: ICD-10-PCS | Mod: S$GLB,,, | Performed by: DERMATOLOGY

## 2021-06-21 PROCEDURE — 99499 UNLISTED E&M SERVICE: CPT | Mod: S$GLB,,, | Performed by: DERMATOLOGY

## 2021-06-21 PROCEDURE — 99499 NO LOS: ICD-10-PCS | Mod: S$GLB,,, | Performed by: DERMATOLOGY

## 2021-06-21 PROCEDURE — 13121 CMPLX RPR S/A/L 2.6-7.5 CM: CPT | Mod: S$GLB,,, | Performed by: DERMATOLOGY

## 2021-06-21 PROCEDURE — 88305 TISSUE EXAM BY PATHOLOGIST: ICD-10-PCS | Mod: 26,,, | Performed by: PATHOLOGY

## 2021-06-21 PROCEDURE — 88305 TISSUE EXAM BY PATHOLOGIST: CPT | Performed by: PATHOLOGY

## 2021-06-21 PROCEDURE — 11402 EXC TR-EXT B9+MARG 1.1-2 CM: CPT | Mod: 51,S$GLB,, | Performed by: DERMATOLOGY

## 2021-06-28 ENCOUNTER — PATIENT MESSAGE (OUTPATIENT)
Dept: ADMINISTRATIVE | Facility: OTHER | Age: 36
End: 2021-06-28

## 2021-06-28 LAB
FINAL PATHOLOGIC DIAGNOSIS: NORMAL
GROSS: NORMAL
Lab: NORMAL
MICROSCOPIC EXAM: NORMAL

## 2021-07-01 ENCOUNTER — OFFICE VISIT (OUTPATIENT)
Dept: BARIATRICS | Facility: CLINIC | Age: 36
End: 2021-07-01
Payer: COMMERCIAL

## 2021-07-01 VITALS
SYSTOLIC BLOOD PRESSURE: 115 MMHG | BODY MASS INDEX: 35.35 KG/M2 | WEIGHT: 212.38 LBS | OXYGEN SATURATION: 97 % | DIASTOLIC BLOOD PRESSURE: 65 MMHG | HEART RATE: 69 BPM

## 2021-07-01 DIAGNOSIS — K76.0 FATTY LIVER: ICD-10-CM

## 2021-07-01 DIAGNOSIS — I10 ESSENTIAL HYPERTENSION: ICD-10-CM

## 2021-07-01 DIAGNOSIS — K21.9 GASTROESOPHAGEAL REFLUX DISEASE WITHOUT ESOPHAGITIS: ICD-10-CM

## 2021-07-01 DIAGNOSIS — E66.01 CLASS 2 SEVERE OBESITY DUE TO EXCESS CALORIES WITH SERIOUS COMORBIDITY AND BODY MASS INDEX (BMI) OF 35.0 TO 35.9 IN ADULT: Primary | ICD-10-CM

## 2021-07-01 DIAGNOSIS — E78.5 HYPERLIPIDEMIA, UNSPECIFIED HYPERLIPIDEMIA TYPE: ICD-10-CM

## 2021-07-01 PROCEDURE — 99999 PR PBB SHADOW E&M-EST. PATIENT-LVL III: ICD-10-PCS | Mod: PBBFAC,,, | Performed by: STUDENT IN AN ORGANIZED HEALTH CARE EDUCATION/TRAINING PROGRAM

## 2021-07-01 PROCEDURE — 3008F PR BODY MASS INDEX (BMI) DOCUMENTED: ICD-10-PCS | Mod: CPTII,S$GLB,, | Performed by: STUDENT IN AN ORGANIZED HEALTH CARE EDUCATION/TRAINING PROGRAM

## 2021-07-01 PROCEDURE — 99213 OFFICE O/P EST LOW 20 MIN: CPT | Mod: S$GLB,,, | Performed by: STUDENT IN AN ORGANIZED HEALTH CARE EDUCATION/TRAINING PROGRAM

## 2021-07-01 PROCEDURE — 3074F PR MOST RECENT SYSTOLIC BLOOD PRESSURE < 130 MM HG: ICD-10-PCS | Mod: CPTII,S$GLB,, | Performed by: STUDENT IN AN ORGANIZED HEALTH CARE EDUCATION/TRAINING PROGRAM

## 2021-07-01 PROCEDURE — 99213 PR OFFICE/OUTPT VISIT, EST, LEVL III, 20-29 MIN: ICD-10-PCS | Mod: S$GLB,,, | Performed by: STUDENT IN AN ORGANIZED HEALTH CARE EDUCATION/TRAINING PROGRAM

## 2021-07-01 PROCEDURE — 99999 PR PBB SHADOW E&M-EST. PATIENT-LVL III: CPT | Mod: PBBFAC,,, | Performed by: STUDENT IN AN ORGANIZED HEALTH CARE EDUCATION/TRAINING PROGRAM

## 2021-07-01 PROCEDURE — 3074F SYST BP LT 130 MM HG: CPT | Mod: CPTII,S$GLB,, | Performed by: STUDENT IN AN ORGANIZED HEALTH CARE EDUCATION/TRAINING PROGRAM

## 2021-07-01 PROCEDURE — 1126F AMNT PAIN NOTED NONE PRSNT: CPT | Mod: S$GLB,,, | Performed by: STUDENT IN AN ORGANIZED HEALTH CARE EDUCATION/TRAINING PROGRAM

## 2021-07-01 PROCEDURE — 3008F BODY MASS INDEX DOCD: CPT | Mod: CPTII,S$GLB,, | Performed by: STUDENT IN AN ORGANIZED HEALTH CARE EDUCATION/TRAINING PROGRAM

## 2021-07-01 PROCEDURE — 3078F PR MOST RECENT DIASTOLIC BLOOD PRESSURE < 80 MM HG: ICD-10-PCS | Mod: CPTII,S$GLB,, | Performed by: STUDENT IN AN ORGANIZED HEALTH CARE EDUCATION/TRAINING PROGRAM

## 2021-07-01 PROCEDURE — 3078F DIAST BP <80 MM HG: CPT | Mod: CPTII,S$GLB,, | Performed by: STUDENT IN AN ORGANIZED HEALTH CARE EDUCATION/TRAINING PROGRAM

## 2021-07-01 PROCEDURE — 1126F PR PAIN SEVERITY QUANTIFIED, NO PAIN PRESENT: ICD-10-PCS | Mod: S$GLB,,, | Performed by: STUDENT IN AN ORGANIZED HEALTH CARE EDUCATION/TRAINING PROGRAM

## 2021-07-01 RX ORDER — SEMAGLUTIDE 1.34 MG/ML
1 INJECTION, SOLUTION SUBCUTANEOUS
Qty: 1 PEN | Refills: 2 | Status: SHIPPED | OUTPATIENT
Start: 2021-07-01 | End: 2021-09-01 | Stop reason: SDUPTHER

## 2021-07-01 RX ORDER — SEMAGLUTIDE 1.34 MG/ML
INJECTION, SOLUTION SUBCUTANEOUS
COMMUNITY
Start: 2021-06-07 | End: 2021-07-01 | Stop reason: SDUPTHER

## 2021-07-02 ENCOUNTER — PATIENT MESSAGE (OUTPATIENT)
Dept: DERMATOLOGY | Facility: CLINIC | Age: 36
End: 2021-07-02

## 2021-07-06 ENCOUNTER — OFFICE VISIT (OUTPATIENT)
Dept: DERMATOLOGY | Facility: CLINIC | Age: 36
End: 2021-07-06
Payer: COMMERCIAL

## 2021-07-06 DIAGNOSIS — Z09 POSTOP CHECK: Primary | ICD-10-CM

## 2021-07-06 PROCEDURE — 99024 POSTOP FOLLOW-UP VISIT: CPT | Mod: S$GLB,,, | Performed by: DERMATOLOGY

## 2021-07-06 PROCEDURE — 99999 PR PBB SHADOW E&M-EST. PATIENT-LVL II: CPT | Mod: PBBFAC,,, | Performed by: DERMATOLOGY

## 2021-07-06 PROCEDURE — 99999 PR PBB SHADOW E&M-EST. PATIENT-LVL II: ICD-10-PCS | Mod: PBBFAC,,, | Performed by: DERMATOLOGY

## 2021-07-06 PROCEDURE — 1126F AMNT PAIN NOTED NONE PRSNT: CPT | Mod: S$GLB,,, | Performed by: DERMATOLOGY

## 2021-07-06 PROCEDURE — 1126F PR PAIN SEVERITY QUANTIFIED, NO PAIN PRESENT: ICD-10-PCS | Mod: S$GLB,,, | Performed by: DERMATOLOGY

## 2021-07-06 PROCEDURE — 99024 PR POST-OP FOLLOW-UP VISIT: ICD-10-PCS | Mod: S$GLB,,, | Performed by: DERMATOLOGY

## 2021-07-21 ENCOUNTER — LAB VISIT (OUTPATIENT)
Dept: LAB | Facility: HOSPITAL | Age: 36
End: 2021-07-21
Attending: INTERNAL MEDICINE
Payer: COMMERCIAL

## 2021-07-21 ENCOUNTER — OFFICE VISIT (OUTPATIENT)
Dept: INTERNAL MEDICINE | Facility: CLINIC | Age: 36
End: 2021-07-21
Payer: COMMERCIAL

## 2021-07-21 VITALS
SYSTOLIC BLOOD PRESSURE: 128 MMHG | HEIGHT: 65 IN | DIASTOLIC BLOOD PRESSURE: 70 MMHG | HEART RATE: 84 BPM | OXYGEN SATURATION: 99 % | BODY MASS INDEX: 34.85 KG/M2 | WEIGHT: 209.19 LBS

## 2021-07-21 DIAGNOSIS — Z85.820 HISTORY OF MELANOMA: ICD-10-CM

## 2021-07-21 DIAGNOSIS — Z00.00 VISIT FOR ANNUAL HEALTH EXAMINATION: ICD-10-CM

## 2021-07-21 DIAGNOSIS — E78.2 MIXED HYPERLIPIDEMIA: ICD-10-CM

## 2021-07-21 DIAGNOSIS — K76.0 FATTY LIVER: ICD-10-CM

## 2021-07-21 DIAGNOSIS — Z13.1 SCREENING FOR DIABETES MELLITUS: ICD-10-CM

## 2021-07-21 DIAGNOSIS — I10 ESSENTIAL HYPERTENSION: ICD-10-CM

## 2021-07-21 DIAGNOSIS — Z00.00 VISIT FOR ANNUAL HEALTH EXAMINATION: Primary | ICD-10-CM

## 2021-07-21 DIAGNOSIS — R76.12 POSITIVE QUANTIFERON-TB GOLD TEST: ICD-10-CM

## 2021-07-21 PROBLEM — E66.09 CLASS 1 OBESITY DUE TO EXCESS CALORIES WITH SERIOUS COMORBIDITY AND BODY MASS INDEX (BMI) OF 34.0 TO 34.9 IN ADULT: Status: ACTIVE | Noted: 2018-06-03

## 2021-07-21 PROBLEM — T81.89XA DELAYED SURGICAL WOUND HEALING: Status: RESOLVED | Noted: 2019-12-07 | Resolved: 2021-07-21

## 2021-07-21 PROBLEM — E66.811 CLASS 1 OBESITY DUE TO EXCESS CALORIES WITH SERIOUS COMORBIDITY AND BODY MASS INDEX (BMI) OF 34.0 TO 34.9 IN ADULT: Status: ACTIVE | Noted: 2018-06-03

## 2021-07-21 LAB
ALBUMIN SERPL BCP-MCNC: 4.5 G/DL (ref 3.5–5.2)
ALP SERPL-CCNC: 57 U/L (ref 55–135)
ALT SERPL W/O P-5'-P-CCNC: 38 U/L (ref 10–44)
ANION GAP SERPL CALC-SCNC: 12 MMOL/L (ref 8–16)
AST SERPL-CCNC: 21 U/L (ref 10–40)
BASOPHILS # BLD AUTO: 0.06 K/UL (ref 0–0.2)
BASOPHILS NFR BLD: 0.6 % (ref 0–1.9)
BILIRUB SERPL-MCNC: 0.8 MG/DL (ref 0.1–1)
BUN SERPL-MCNC: 12 MG/DL (ref 6–20)
CALCIUM SERPL-MCNC: 10.2 MG/DL (ref 8.7–10.5)
CHLORIDE SERPL-SCNC: 101 MMOL/L (ref 95–110)
CHOLEST SERPL-MCNC: 151 MG/DL (ref 120–199)
CHOLEST/HDLC SERPL: 3.2 {RATIO} (ref 2–5)
CO2 SERPL-SCNC: 27 MMOL/L (ref 23–29)
CREAT SERPL-MCNC: 0.8 MG/DL (ref 0.5–1.4)
CREAT UR-MCNC: 223 MG/DL (ref 15–325)
DIFFERENTIAL METHOD: NORMAL
EOSINOPHIL # BLD AUTO: 0.1 K/UL (ref 0–0.5)
EOSINOPHIL NFR BLD: 1.2 % (ref 0–8)
ERYTHROCYTE [DISTWIDTH] IN BLOOD BY AUTOMATED COUNT: 12 % (ref 11.5–14.5)
EST. GFR  (AFRICAN AMERICAN): >60 ML/MIN/1.73 M^2
EST. GFR  (NON AFRICAN AMERICAN): >60 ML/MIN/1.73 M^2
ESTIMATED AVG GLUCOSE: 100 MG/DL (ref 68–131)
GLUCOSE SERPL-MCNC: 85 MG/DL (ref 70–110)
HBA1C MFR BLD: 5.1 % (ref 4–5.6)
HCT VFR BLD AUTO: 43 % (ref 37–48.5)
HDLC SERPL-MCNC: 47 MG/DL (ref 40–75)
HDLC SERPL: 31.1 % (ref 20–50)
HGB BLD-MCNC: 14.3 G/DL (ref 12–16)
IMM GRANULOCYTES # BLD AUTO: 0.02 K/UL (ref 0–0.04)
IMM GRANULOCYTES NFR BLD AUTO: 0.2 % (ref 0–0.5)
LDLC SERPL CALC-MCNC: 84.8 MG/DL (ref 63–159)
LYMPHOCYTES # BLD AUTO: 3.7 K/UL (ref 1–4.8)
LYMPHOCYTES NFR BLD: 34.7 % (ref 18–48)
MCH RBC QN AUTO: 29.9 PG (ref 27–31)
MCHC RBC AUTO-ENTMCNC: 33.3 G/DL (ref 32–36)
MCV RBC AUTO: 90 FL (ref 82–98)
MONOCYTES # BLD AUTO: 0.6 K/UL (ref 0.3–1)
MONOCYTES NFR BLD: 5.3 % (ref 4–15)
NEUTROPHILS # BLD AUTO: 6.2 K/UL (ref 1.8–7.7)
NEUTROPHILS NFR BLD: 58 % (ref 38–73)
NONHDLC SERPL-MCNC: 104 MG/DL
NRBC BLD-RTO: 0 /100 WBC
PLATELET # BLD AUTO: 307 K/UL (ref 150–450)
PMV BLD AUTO: 12.2 FL (ref 9.2–12.9)
POTASSIUM SERPL-SCNC: 3.4 MMOL/L (ref 3.5–5.1)
PROT SERPL-MCNC: 7.9 G/DL (ref 6–8.4)
PROT UR-MCNC: 14 MG/DL (ref 0–15)
PROT/CREAT UR: 0.06 MG/G{CREAT} (ref 0–0.2)
RBC # BLD AUTO: 4.78 M/UL (ref 4–5.4)
SODIUM SERPL-SCNC: 140 MMOL/L (ref 136–145)
TRIGL SERPL-MCNC: 96 MG/DL (ref 30–150)
TSH SERPL DL<=0.005 MIU/L-ACNC: 1.01 UIU/ML (ref 0.4–4)
WBC # BLD AUTO: 10.62 K/UL (ref 3.9–12.7)

## 2021-07-21 PROCEDURE — 99395 PREV VISIT EST AGE 18-39: CPT | Mod: S$GLB,,, | Performed by: INTERNAL MEDICINE

## 2021-07-21 PROCEDURE — 1159F PR MEDICATION LIST DOCUMENTED IN MEDICAL RECORD: ICD-10-PCS | Mod: CPTII,S$GLB,, | Performed by: INTERNAL MEDICINE

## 2021-07-21 PROCEDURE — 1160F RVW MEDS BY RX/DR IN RCRD: CPT | Mod: CPTII,S$GLB,, | Performed by: INTERNAL MEDICINE

## 2021-07-21 PROCEDURE — 3074F PR MOST RECENT SYSTOLIC BLOOD PRESSURE < 130 MM HG: ICD-10-PCS | Mod: CPTII,S$GLB,, | Performed by: INTERNAL MEDICINE

## 2021-07-21 PROCEDURE — 99395 PR PREVENTIVE VISIT,EST,18-39: ICD-10-PCS | Mod: S$GLB,,, | Performed by: INTERNAL MEDICINE

## 2021-07-21 PROCEDURE — 3008F PR BODY MASS INDEX (BMI) DOCUMENTED: ICD-10-PCS | Mod: CPTII,S$GLB,, | Performed by: INTERNAL MEDICINE

## 2021-07-21 PROCEDURE — 84443 ASSAY THYROID STIM HORMONE: CPT | Performed by: INTERNAL MEDICINE

## 2021-07-21 PROCEDURE — 1160F PR REVIEW ALL MEDS BY PRESCRIBER/CLIN PHARMACIST DOCUMENTED: ICD-10-PCS | Mod: CPTII,S$GLB,, | Performed by: INTERNAL MEDICINE

## 2021-07-21 PROCEDURE — 36415 COLL VENOUS BLD VENIPUNCTURE: CPT | Performed by: INTERNAL MEDICINE

## 2021-07-21 PROCEDURE — 1126F AMNT PAIN NOTED NONE PRSNT: CPT | Mod: CPTII,S$GLB,, | Performed by: INTERNAL MEDICINE

## 2021-07-21 PROCEDURE — 3078F PR MOST RECENT DIASTOLIC BLOOD PRESSURE < 80 MM HG: ICD-10-PCS | Mod: CPTII,S$GLB,, | Performed by: INTERNAL MEDICINE

## 2021-07-21 PROCEDURE — 82570 ASSAY OF URINE CREATININE: CPT | Performed by: INTERNAL MEDICINE

## 2021-07-21 PROCEDURE — 83036 HEMOGLOBIN GLYCOSYLATED A1C: CPT | Performed by: INTERNAL MEDICINE

## 2021-07-21 PROCEDURE — 1126F PR PAIN SEVERITY QUANTIFIED, NO PAIN PRESENT: ICD-10-PCS | Mod: CPTII,S$GLB,, | Performed by: INTERNAL MEDICINE

## 2021-07-21 PROCEDURE — 85025 COMPLETE CBC W/AUTO DIFF WBC: CPT | Performed by: INTERNAL MEDICINE

## 2021-07-21 PROCEDURE — 1159F MED LIST DOCD IN RCRD: CPT | Mod: CPTII,S$GLB,, | Performed by: INTERNAL MEDICINE

## 2021-07-21 PROCEDURE — 3074F SYST BP LT 130 MM HG: CPT | Mod: CPTII,S$GLB,, | Performed by: INTERNAL MEDICINE

## 2021-07-21 PROCEDURE — 80053 COMPREHEN METABOLIC PANEL: CPT | Performed by: INTERNAL MEDICINE

## 2021-07-21 PROCEDURE — 3008F BODY MASS INDEX DOCD: CPT | Mod: CPTII,S$GLB,, | Performed by: INTERNAL MEDICINE

## 2021-07-21 PROCEDURE — 99999 PR PBB SHADOW E&M-EST. PATIENT-LVL IV: CPT | Mod: PBBFAC,,, | Performed by: INTERNAL MEDICINE

## 2021-07-21 PROCEDURE — 99999 PR PBB SHADOW E&M-EST. PATIENT-LVL IV: ICD-10-PCS | Mod: PBBFAC,,, | Performed by: INTERNAL MEDICINE

## 2021-07-21 PROCEDURE — 3078F DIAST BP <80 MM HG: CPT | Mod: CPTII,S$GLB,, | Performed by: INTERNAL MEDICINE

## 2021-07-21 PROCEDURE — 80061 LIPID PANEL: CPT | Performed by: INTERNAL MEDICINE

## 2021-07-21 RX ORDER — CHLORTHALIDONE 25 MG/1
12.5 TABLET ORAL DAILY
Qty: 45 TABLET | Refills: 0 | Status: SHIPPED | OUTPATIENT
Start: 2021-07-21 | End: 2021-09-01 | Stop reason: SDUPTHER

## 2021-07-22 ENCOUNTER — TELEPHONE (OUTPATIENT)
Dept: INTERNAL MEDICINE | Facility: CLINIC | Age: 36
End: 2021-07-22

## 2021-07-22 DIAGNOSIS — E78.2 MIXED HYPERLIPIDEMIA: Primary | ICD-10-CM

## 2021-07-22 DIAGNOSIS — E87.6 HYPOKALEMIA: ICD-10-CM

## 2021-07-23 ENCOUNTER — PATIENT OUTREACH (OUTPATIENT)
Dept: ADMINISTRATIVE | Facility: OTHER | Age: 36
End: 2021-07-23

## 2021-08-02 ENCOUNTER — PATIENT MESSAGE (OUTPATIENT)
Dept: INTERNAL MEDICINE | Facility: CLINIC | Age: 36
End: 2021-08-02

## 2021-08-03 ENCOUNTER — PATIENT MESSAGE (OUTPATIENT)
Dept: INTERNAL MEDICINE | Facility: CLINIC | Age: 36
End: 2021-08-03

## 2021-08-21 DIAGNOSIS — I10 ESSENTIAL HYPERTENSION: ICD-10-CM

## 2021-08-23 RX ORDER — AMLODIPINE BESYLATE 5 MG/1
5 TABLET ORAL DAILY
Qty: 90 TABLET | Refills: 1 | Status: SHIPPED | OUTPATIENT
Start: 2021-08-23 | End: 2022-03-13 | Stop reason: SDUPTHER

## 2021-09-01 ENCOUNTER — PATIENT MESSAGE (OUTPATIENT)
Dept: BARIATRICS | Facility: CLINIC | Age: 36
End: 2021-09-01

## 2021-09-01 DIAGNOSIS — I10 ESSENTIAL HYPERTENSION: ICD-10-CM

## 2021-09-03 RX ORDER — CHLORTHALIDONE 25 MG/1
12.5 TABLET ORAL DAILY
Qty: 45 TABLET | Refills: 1 | Status: SHIPPED | OUTPATIENT
Start: 2021-09-03 | End: 2022-03-13 | Stop reason: SDUPTHER

## 2021-10-28 ENCOUNTER — TELEPHONE (OUTPATIENT)
Dept: HEPATOLOGY | Facility: CLINIC | Age: 36
End: 2021-10-28

## 2021-10-28 ENCOUNTER — OFFICE VISIT (OUTPATIENT)
Dept: HEPATOLOGY | Facility: CLINIC | Age: 36
End: 2021-10-28
Payer: COMMERCIAL

## 2021-10-28 DIAGNOSIS — K76.0 FATTY LIVER: Primary | ICD-10-CM

## 2021-10-28 PROCEDURE — 3044F HG A1C LEVEL LT 7.0%: CPT | Mod: CPTII,95,, | Performed by: NURSE PRACTITIONER

## 2021-10-28 PROCEDURE — 1160F RVW MEDS BY RX/DR IN RCRD: CPT | Mod: CPTII,95,, | Performed by: NURSE PRACTITIONER

## 2021-10-28 PROCEDURE — 1159F MED LIST DOCD IN RCRD: CPT | Mod: CPTII,95,, | Performed by: NURSE PRACTITIONER

## 2021-10-28 PROCEDURE — 99214 PR OFFICE/OUTPT VISIT, EST, LEVL IV, 30-39 MIN: ICD-10-PCS | Mod: 95,,, | Performed by: NURSE PRACTITIONER

## 2021-10-28 PROCEDURE — 1159F PR MEDICATION LIST DOCUMENTED IN MEDICAL RECORD: ICD-10-PCS | Mod: CPTII,95,, | Performed by: NURSE PRACTITIONER

## 2021-10-28 PROCEDURE — 3044F PR MOST RECENT HEMOGLOBIN A1C LEVEL <7.0%: ICD-10-PCS | Mod: CPTII,95,, | Performed by: NURSE PRACTITIONER

## 2021-10-28 PROCEDURE — 99214 OFFICE O/P EST MOD 30 MIN: CPT | Mod: 95,,, | Performed by: NURSE PRACTITIONER

## 2021-10-28 PROCEDURE — 1160F PR REVIEW ALL MEDS BY PRESCRIBER/CLIN PHARMACIST DOCUMENTED: ICD-10-PCS | Mod: CPTII,95,, | Performed by: NURSE PRACTITIONER

## 2021-11-16 ENCOUNTER — PATIENT MESSAGE (OUTPATIENT)
Dept: INTERNAL MEDICINE | Facility: CLINIC | Age: 36
End: 2021-11-16
Payer: COMMERCIAL

## 2021-11-16 ENCOUNTER — TELEPHONE (OUTPATIENT)
Dept: HEPATOLOGY | Facility: CLINIC | Age: 36
End: 2021-11-16
Payer: COMMERCIAL

## 2021-12-13 ENCOUNTER — PATIENT MESSAGE (OUTPATIENT)
Dept: HEPATOLOGY | Facility: CLINIC | Age: 36
End: 2021-12-13

## 2021-12-13 ENCOUNTER — PROCEDURE VISIT (OUTPATIENT)
Dept: HEPATOLOGY | Facility: CLINIC | Age: 36
End: 2021-12-13
Payer: COMMERCIAL

## 2021-12-13 ENCOUNTER — TELEPHONE (OUTPATIENT)
Dept: HEPATOLOGY | Facility: CLINIC | Age: 36
End: 2021-12-13

## 2021-12-13 DIAGNOSIS — K76.0 FATTY LIVER: Primary | ICD-10-CM

## 2021-12-13 PROCEDURE — 91200 LIVER ELASTOGRAPHY: CPT | Mod: S$GLB,,, | Performed by: NURSE PRACTITIONER

## 2021-12-13 PROCEDURE — 91200 FIBROSCAN (VIBRATION CONTROLLED TRANSIENT ELASTOGRAPHY): ICD-10-PCS | Mod: S$GLB,,, | Performed by: NURSE PRACTITIONER

## 2021-12-14 ENCOUNTER — IMMUNIZATION (OUTPATIENT)
Dept: PHARMACY | Facility: CLINIC | Age: 36
End: 2021-12-14
Payer: COMMERCIAL

## 2021-12-14 ENCOUNTER — LAB VISIT (OUTPATIENT)
Dept: LAB | Facility: HOSPITAL | Age: 36
End: 2021-12-14
Attending: INTERNAL MEDICINE
Payer: COMMERCIAL

## 2021-12-14 DIAGNOSIS — E78.2 MIXED HYPERLIPIDEMIA: ICD-10-CM

## 2021-12-14 DIAGNOSIS — E87.6 HYPOKALEMIA: ICD-10-CM

## 2021-12-14 LAB
CHOLEST SERPL-MCNC: 193 MG/DL (ref 120–199)
CHOLEST/HDLC SERPL: 3.4 {RATIO} (ref 2–5)
HDLC SERPL-MCNC: 56 MG/DL (ref 40–75)
HDLC SERPL: 29 % (ref 20–50)
LDLC SERPL CALC-MCNC: 118.4 MG/DL (ref 63–159)
NONHDLC SERPL-MCNC: 137 MG/DL
POTASSIUM SERPL-SCNC: 4.1 MMOL/L (ref 3.5–5.1)
TRIGL SERPL-MCNC: 93 MG/DL (ref 30–150)

## 2021-12-14 PROCEDURE — 84132 ASSAY OF SERUM POTASSIUM: CPT | Performed by: INTERNAL MEDICINE

## 2021-12-14 PROCEDURE — 80061 LIPID PANEL: CPT | Performed by: INTERNAL MEDICINE

## 2021-12-14 PROCEDURE — 36415 COLL VENOUS BLD VENIPUNCTURE: CPT | Performed by: INTERNAL MEDICINE

## 2022-01-03 ENCOUNTER — PATIENT MESSAGE (OUTPATIENT)
Dept: ADMINISTRATIVE | Facility: OTHER | Age: 37
End: 2022-01-03
Payer: COMMERCIAL

## 2022-01-03 ENCOUNTER — LAB VISIT (OUTPATIENT)
Dept: PRIMARY CARE CLINIC | Facility: OTHER | Age: 37
End: 2022-01-03
Attending: INTERNAL MEDICINE
Payer: COMMERCIAL

## 2022-01-03 DIAGNOSIS — R05.9 COUGH: ICD-10-CM

## 2022-01-03 PROCEDURE — U0003 INFECTIOUS AGENT DETECTION BY NUCLEIC ACID (DNA OR RNA); SEVERE ACUTE RESPIRATORY SYNDROME CORONAVIRUS 2 (SARS-COV-2) (CORONAVIRUS DISEASE [COVID-19]), AMPLIFIED PROBE TECHNIQUE, MAKING USE OF HIGH THROUGHPUT TECHNOLOGIES AS DESCRIBED BY CMS-2020-01-R: HCPCS | Performed by: INTERNAL MEDICINE

## 2022-01-07 DIAGNOSIS — U07.1 COVID-19 VIRUS DETECTED: ICD-10-CM

## 2022-01-07 LAB
SARS-COV-2 RNA RESP QL NAA+PROBE: DETECTED
SARS-COV-2- CYCLE NUMBER: 24

## 2022-01-16 ENCOUNTER — PATIENT MESSAGE (OUTPATIENT)
Dept: ADMINISTRATIVE | Facility: OTHER | Age: 37
End: 2022-01-16
Payer: COMMERCIAL

## 2022-01-29 ENCOUNTER — PATIENT MESSAGE (OUTPATIENT)
Dept: BARIATRICS | Facility: CLINIC | Age: 37
End: 2022-01-29
Payer: COMMERCIAL

## 2022-01-31 DIAGNOSIS — E78.5 HYPERLIPIDEMIA, UNSPECIFIED HYPERLIPIDEMIA TYPE: ICD-10-CM

## 2022-01-31 NOTE — TELEPHONE ENCOUNTER
No new care gaps identified.  Powered by [employer]. Reference number: [id number].   1/31/2022 1:01:44 PM CST

## 2022-02-01 ENCOUNTER — PATIENT MESSAGE (OUTPATIENT)
Dept: INTERNAL MEDICINE | Facility: CLINIC | Age: 37
End: 2022-02-01
Payer: COMMERCIAL

## 2022-02-05 ENCOUNTER — PATIENT MESSAGE (OUTPATIENT)
Dept: INTERNAL MEDICINE | Facility: CLINIC | Age: 37
End: 2022-02-05
Payer: COMMERCIAL

## 2022-02-05 DIAGNOSIS — E78.5 HYPERLIPIDEMIA, UNSPECIFIED HYPERLIPIDEMIA TYPE: ICD-10-CM

## 2022-02-05 DIAGNOSIS — E78.2 MIXED HYPERLIPIDEMIA: Primary | ICD-10-CM

## 2022-02-05 NOTE — TELEPHONE ENCOUNTER
No new care gaps identified.  Powered by [employer]. Reference number: [id number].   2/05/2022 10:51:06 AM CST

## 2022-02-07 RX ORDER — ATORVASTATIN CALCIUM 10 MG/1
10 TABLET, FILM COATED ORAL DAILY
Qty: 90 TABLET | Refills: 1 | Status: SHIPPED | OUTPATIENT
Start: 2022-02-07 | End: 2022-08-22

## 2022-02-11 RX ORDER — ATORVASTATIN CALCIUM 10 MG/1
TABLET, FILM COATED ORAL
Qty: 90 TABLET | Refills: 1 | OUTPATIENT
Start: 2022-02-11

## 2022-02-14 ENCOUNTER — TELEPHONE (OUTPATIENT)
Dept: INTERNAL MEDICINE | Facility: CLINIC | Age: 37
End: 2022-02-14
Payer: COMMERCIAL

## 2022-02-14 DIAGNOSIS — Z13.0 SCREENING, ANEMIA, DEFICIENCY, IRON: ICD-10-CM

## 2022-02-14 DIAGNOSIS — I10 ESSENTIAL HYPERTENSION: Primary | ICD-10-CM

## 2022-02-14 DIAGNOSIS — Z13.29 SCREENING FOR THYROID DISORDER: ICD-10-CM

## 2022-02-14 DIAGNOSIS — Z13.1 SCREENING FOR DIABETES MELLITUS: ICD-10-CM

## 2022-02-14 DIAGNOSIS — E78.2 MIXED HYPERLIPIDEMIA: ICD-10-CM

## 2022-02-14 NOTE — TELEPHONE ENCOUNTER
----- Message from Mariah Lechuga sent at 2/14/2022  1:35 PM CST -----  Contact: [phone]  Doctor appointment and lab have been scheduled.  Please link lab orders to the lab appointment.  Date of doctor appointment:  8/22/22  Date of lab appointment:  8/15/22  Physical or F/U: physical  Comments:

## 2022-03-04 ENCOUNTER — PATIENT MESSAGE (OUTPATIENT)
Dept: INTERNAL MEDICINE | Facility: CLINIC | Age: 37
End: 2022-03-04
Payer: COMMERCIAL

## 2022-03-07 ENCOUNTER — PATIENT MESSAGE (OUTPATIENT)
Dept: INTERNAL MEDICINE | Facility: CLINIC | Age: 37
End: 2022-03-07
Payer: COMMERCIAL

## 2022-03-13 DIAGNOSIS — I10 ESSENTIAL HYPERTENSION: ICD-10-CM

## 2022-03-13 NOTE — TELEPHONE ENCOUNTER
No new care gaps identified.  Powered by [employer]. Reference number: [id number].   3/13/2022 3:25:17 PM CDT

## 2022-03-14 RX ORDER — AMLODIPINE BESYLATE 5 MG/1
5 TABLET ORAL DAILY
Qty: 90 TABLET | Refills: 1 | Status: SHIPPED | OUTPATIENT
Start: 2022-03-14 | End: 2022-08-22

## 2022-03-14 RX ORDER — CHLORTHALIDONE 25 MG/1
12.5 TABLET ORAL DAILY
Qty: 45 TABLET | Refills: 1 | Status: SHIPPED | OUTPATIENT
Start: 2022-03-14 | End: 2022-08-22

## 2022-03-15 ENCOUNTER — TELEPHONE (OUTPATIENT)
Dept: BARIATRICS | Facility: CLINIC | Age: 37
End: 2022-03-15
Payer: COMMERCIAL

## 2022-03-15 NOTE — TELEPHONE ENCOUNTER
Phoned pt in regard to FC phone call appt pt stated she has not received a call for 11:00 . Pt stated this is not a goof atart for her and she would like her Fc phone call appt canceled due to not receiving a call sooner.

## 2022-03-15 NOTE — TELEPHONE ENCOUNTER
----- Message from Gabriela Hoang sent at 3/15/2022 11:39 AM CDT -----  Contact: Patient  Patient requesting call back in regards to scheduled visit today.      Patient @[phone] (home) [phone] (work)

## 2022-03-17 ENCOUNTER — OFFICE VISIT (OUTPATIENT)
Dept: INTERNAL MEDICINE | Facility: CLINIC | Age: 37
End: 2022-03-17
Payer: COMMERCIAL

## 2022-03-17 VITALS
DIASTOLIC BLOOD PRESSURE: 80 MMHG | RESPIRATION RATE: 16 BRPM | BODY MASS INDEX: 37.25 KG/M2 | HEIGHT: 65 IN | SYSTOLIC BLOOD PRESSURE: 114 MMHG | WEIGHT: 223.56 LBS | HEART RATE: 71 BPM | OXYGEN SATURATION: 100 %

## 2022-03-17 DIAGNOSIS — Z13.220 SCREENING FOR LIPOID DISORDERS: ICD-10-CM

## 2022-03-17 DIAGNOSIS — K76.0 FATTY LIVER: ICD-10-CM

## 2022-03-17 DIAGNOSIS — R53.83 FATIGUE, UNSPECIFIED TYPE: ICD-10-CM

## 2022-03-17 DIAGNOSIS — Z13.29 SCREENING FOR THYROID DISORDER: ICD-10-CM

## 2022-03-17 DIAGNOSIS — C43.61 MALIGNANT MELANOMA OF RIGHT SHOULDER: ICD-10-CM

## 2022-03-17 DIAGNOSIS — E66.01 CLASS 2 SEVERE OBESITY DUE TO EXCESS CALORIES WITH SERIOUS COMORBIDITY AND BODY MASS INDEX (BMI) OF 35.0 TO 35.9 IN ADULT: ICD-10-CM

## 2022-03-17 DIAGNOSIS — Z13.21 SCREENING FOR MALNUTRITION: ICD-10-CM

## 2022-03-17 DIAGNOSIS — Z13.0 SCREENING FOR IRON DEFICIENCY ANEMIA: ICD-10-CM

## 2022-03-17 DIAGNOSIS — I10 ESSENTIAL HYPERTENSION: ICD-10-CM

## 2022-03-17 DIAGNOSIS — C43.71 MALIGNANT MELANOMA OF RIGHT LOWER EXTREMITY: ICD-10-CM

## 2022-03-17 DIAGNOSIS — K21.9 GASTROESOPHAGEAL REFLUX DISEASE WITHOUT ESOPHAGITIS: ICD-10-CM

## 2022-03-17 DIAGNOSIS — E78.2 MIXED HYPERLIPIDEMIA: ICD-10-CM

## 2022-03-17 DIAGNOSIS — R68.89 ABNORMAL ENDOCRINE LABORATORY TEST FINDING: ICD-10-CM

## 2022-03-17 DIAGNOSIS — Z79.899 ENCOUNTER FOR LONG-TERM (CURRENT) USE OF OTHER MEDICATIONS: ICD-10-CM

## 2022-03-17 DIAGNOSIS — Z01.818 PREOPERATIVE CLEARANCE: Primary | ICD-10-CM

## 2022-03-17 PROCEDURE — 99999 PR PBB SHADOW E&M-EST. PATIENT-LVL IV: ICD-10-PCS | Mod: PBBFAC,,, | Performed by: NURSE PRACTITIONER

## 2022-03-17 PROCEDURE — 1159F PR MEDICATION LIST DOCUMENTED IN MEDICAL RECORD: ICD-10-PCS | Mod: CPTII,S$GLB,, | Performed by: NURSE PRACTITIONER

## 2022-03-17 PROCEDURE — 3079F PR MOST RECENT DIASTOLIC BLOOD PRESSURE 80-89 MM HG: ICD-10-PCS | Mod: CPTII,S$GLB,, | Performed by: NURSE PRACTITIONER

## 2022-03-17 PROCEDURE — 3008F BODY MASS INDEX DOCD: CPT | Mod: CPTII,S$GLB,, | Performed by: NURSE PRACTITIONER

## 2022-03-17 PROCEDURE — 99214 OFFICE O/P EST MOD 30 MIN: CPT | Mod: S$GLB,,, | Performed by: NURSE PRACTITIONER

## 2022-03-17 PROCEDURE — 99214 PR OFFICE/OUTPT VISIT, EST, LEVL IV, 30-39 MIN: ICD-10-PCS | Mod: S$GLB,,, | Performed by: NURSE PRACTITIONER

## 2022-03-17 PROCEDURE — 3079F DIAST BP 80-89 MM HG: CPT | Mod: CPTII,S$GLB,, | Performed by: NURSE PRACTITIONER

## 2022-03-17 PROCEDURE — 99999 PR PBB SHADOW E&M-EST. PATIENT-LVL IV: CPT | Mod: PBBFAC,,, | Performed by: NURSE PRACTITIONER

## 2022-03-17 PROCEDURE — 3008F PR BODY MASS INDEX (BMI) DOCUMENTED: ICD-10-PCS | Mod: CPTII,S$GLB,, | Performed by: NURSE PRACTITIONER

## 2022-03-17 PROCEDURE — 3074F SYST BP LT 130 MM HG: CPT | Mod: CPTII,S$GLB,, | Performed by: NURSE PRACTITIONER

## 2022-03-17 PROCEDURE — 1159F MED LIST DOCD IN RCRD: CPT | Mod: CPTII,S$GLB,, | Performed by: NURSE PRACTITIONER

## 2022-03-17 PROCEDURE — 3074F PR MOST RECENT SYSTOLIC BLOOD PRESSURE < 130 MM HG: ICD-10-PCS | Mod: CPTII,S$GLB,, | Performed by: NURSE PRACTITIONER

## 2022-03-17 NOTE — PROGRESS NOTES
The patient, Katarina Munguia , who is a 36 y.o.  female with HTN, HLD, GERD, and h/o malignant melanoma presents for a preoperative exam.     No ref. provider found     HPI:  Here for preoperative clearance prior to bariatric surgery - Was followed by HELLEN Norris for approx 1 year - lost 40 lbs. Was paying out of pocket. Gained 20 lbs back.   Has decided to get gastric sleeve with Dr MISTY Moya     HTN- taking amlodipine and chlorthalidone    HLD- taking lipitor     GERD- taking PPI as needed for reflux     Taking a multivitamin     H/o malignant melanoma- Followed by Dr Howard - skin checks every 3 months     HM-  Immunizations:   Influenza 12/2021  TDap 4/2021  Prevnar rec 11/2020, pneumovax 23 today .  Shingrix rec at 51  Moderna covid vaccines completed 3/2021, 4/2021     Cancer Screening:  PAP: 12/2021 NILM sees Dr. Mas  Mammogram:  rec at 41  Colonoscopy:  rec at 46       Patient Active Problem List   Diagnosis    Essential hypertension    Hyperlipidemia    Nasal polyp    History of melanoma    Class 2 severe obesity due to excess calories with serious comorbidity and body mass index (BMI) of 35.0 to 35.9 in adult    Positive QuantiFERON-TB Gold test    Gastroesophageal reflux disease without esophagitis    Fatty liver    Malignant melanoma of right lower extremity    Dyspepsia    Abnormal granulation tissue        Past Medical History:   Diagnosis Date    Allergy     Dysplastic nevus 06/21/2021    r upper arm    Fatty liver disease, nonalcoholic     Hypertension     Melanoma 2006    back    Melanoma 11/2019    R ankle with Dr. Ferrell with [facility]    Melanoma 11/2020    R shoulder excised at [facility]        Past Surgical History:   Procedure Laterality Date    AUGMENTATION OF BREAST      ESOPHAGOGASTRODUODENOSCOPY N/A 11/29/2019    Procedure: ESOPHAGOGASTRODUODENOSCOPY (EGD);  Surgeon: Katty Garcia MD;  Location: [facility];  Service: Endoscopy;  Laterality: N/A;    FOOT SURGERY       FOOT SURGERY Right 2006    removal of glass    INJECTION FOR SENTINEL NODE IDENTIFICATION Right 11/25/2019    Procedure: INJECTION, FOR SENTINEL NODE IDENTIFICATION-Right groin;  Surgeon: Cj Ferrell MD;  Location: [facility] OR [address] [place] [address];  Service: General;  Laterality: Right;    SENTINEL LYMPH NODE BIOPSY Right 11/25/2019    Procedure: BIOPSY, LYMPH NODE, SENTINEL-Right groin;  Surgeon: Cj Ferrell MD;  Location: [facility] OR [address] [place] [address];  Service: General;  Laterality: Right;    TONSILLECTOMY          Family History   Problem Relation Age of Onset    Hypertension Mother     Hyperlipidemia Mother     Transient ischemic attack Mother     Lymphoma Mother         non Hodgkins lymphoma, Dr. Martin    Cancer Father     Lung cancer Father         smoker    Heart disease Maternal Grandfather     Heart disease Sister         [place]        Social History     Tobacco Use   Smoking Status Never Smoker   Smokeless Tobacco Never Used        Allergies as of 03/17/2022    (No Known Allergies)        Current Outpatient Medications on File Prior to Visit   Medication Sig Dispense Refill    amLODIPine (NORVASC) 5 MG tablet Take 1 tablet (5 mg total) by mouth once daily. 90 tablet 1    atorvastatin (LIPITOR) 10 MG tablet Take 1 tablet (10 mg total) by mouth once daily. 90 tablet 1    chlorthalidone (HYGROTEN) 25 MG Tab Take 0.5 tablets (12.5 mg total) by mouth once daily. 45 tablet 1    flu vacc [id number] 6mos up,PF, 60 mcg (15 mcg x 4)/0.5 mL Syrg inject into the muscle 0.5 mL 0    fluticasone propionate (FLONASE) 50 mcg/actuation nasal spray PUT 1 SPRAY BY EACH NOSTRIL ROUTE 2 TIMES DAILY. 48 mL 0    multivitamin capsule Take 1 capsule by mouth once daily.      pantoprazole (PROTONIX) 40 MG tablet Take 1 tablet (40 mg total) by mouth once daily. 90 tablet 1    [DISCONTINUED] mupirocin (BACTROBAN) 2 % ointment Apply topically 2 (two) times daily. 15 g 0    [DISCONTINUED] semaglutide (OZEMPIC) 1 mg/dose (4 mg/3 mL)  "Inject 1 mg into the skin every 7 days. 1 pen 2     No current facility-administered medications on file prior to visit.        Review of Systems -  Review of Systems   Constitutional: Negative for chills, diaphoresis, fever and malaise/fatigue.   HENT: Negative for congestion, nosebleeds, sinus pain and sore throat.    Eyes: Negative for blurred vision.   Respiratory: Negative for cough, shortness of breath and wheezing.    Cardiovascular: Positive for leg swelling (2/2 lymph node removal ). Negative for chest pain and orthopnea.   Gastrointestinal: Negative for abdominal pain, blood in stool, diarrhea, melena, nausea and vomiting.   Genitourinary: Negative for dysuria, flank pain, frequency and urgency.        Started new OCP - having break through bleeding    Musculoskeletal: Negative for back pain, joint pain, myalgias and neck pain.   Skin: Negative for itching and rash.   Neurological: Negative for dizziness and headaches.   Psychiatric/Behavioral: Negative for depression. The patient is not nervous/anxious and does not have insomnia.          Vitals:    03/17/22 0839   BP: 114/80   BP Location: Right arm   Patient Position: Sitting   BP Method: Large (Manual)   Pulse: 71   Resp: 16   SpO2: 100%   Weight: 101.4 kg (223 lb 8.7 oz)   Height: 5' 5" (1.651 m)     Body mass index is 37.2 kg/m².     Physical Exam  Vitals reviewed.   Constitutional:       Appearance: She is well-developed. She is obese.   HENT:      Head: Normocephalic.      Right Ear: External ear normal.      Left Ear: External ear normal.      Nose: Nose normal.      Mouth/Throat:      Pharynx: No oropharyngeal exudate.   Eyes:      Pupils: Pupils are equal, round, and reactive to light.   Neck:      Thyroid: No thyromegaly.      Vascular: No JVD.      Trachea: No tracheal deviation.   Cardiovascular:      Rate and Rhythm: Normal rate and regular rhythm.      Heart sounds: Normal heart sounds. No murmur heard.    No friction rub. No gallop. "   Pulmonary:      Effort: Pulmonary effort is normal. No respiratory distress.      Breath sounds: Normal breath sounds. No wheezing or rales.   Abdominal:      General: Bowel sounds are normal. There is no distension.      Palpations: Abdomen is soft.      Tenderness: There is no abdominal tenderness.   Musculoskeletal:         General: No tenderness. Normal range of motion.      Cervical back: Neck supple.   Lymphadenopathy:      Cervical: No cervical adenopathy.   Skin:     General: Skin is warm and dry.      Findings: No rash.   Neurological:      Mental Status: She is alert and oriented to person, place, and time.   Psychiatric:         Behavior: Behavior normal.         Katarina was seen today for clearance.    Diagnoses and all orders for this visit:    Preoperative clearance- clearance pending lab results.  Addendum- labs stable. Pt low surgical risk for intermediate risk surgery     Essential hypertension- at goal. Will cont current meds. Low na diet and weight loss.   -     CBC Auto Differential; Future  -     Comprehensive Metabolic Panel; Future  -     Lipid Panel; Future  -     TSH; Future  -     Vitamin B12 Deficiency Panel; Future  -     Magnesium; Future  -     Vitamin D; Future  -     ZINC; Future  -     VITAMIN B1; Future    Mixed hyperlipidemia- stable.w ill cont statin and low cholesterol diet   -     CBC Auto Differential; Future  -     Comprehensive Metabolic Panel; Future  -     Lipid Panel; Future  -     TSH; Future  -     Vitamin B12 Deficiency Panel; Future  -     Magnesium; Future  -     Vitamin D; Future  -     ZINC; Future  -     VITAMIN B1; Future    Malignant melanoma of right shoulder- f/u with dermatology for routine skin checks.   -     CBC Auto Differential; Future  -     Comprehensive Metabolic Panel; Future  -     Lipid Panel; Future  -     TSH; Future  -     Vitamin B12 Deficiency Panel; Future  -     Magnesium; Future  -     Vitamin D; Future  -     ZINC; Future  -     VITAMIN B1;  Future    Class 2 severe obesity due to excess calories with serious comorbidity and body mass index (BMI) of 35.0 to 35.9 in adult- stable. Will f/u with bariatrics and upcoming gastric sleeve   -     CBC Auto Differential; Future  -     Comprehensive Metabolic Panel; Future  -     Lipid Panel; Future  -     TSH; Future  -     Vitamin B12 Deficiency Panel; Future  -     Magnesium; Future  -     Vitamin D; Future  -     ZINC; Future  -     VITAMIN B1; Future    Malignant melanoma of right lower extremity- f/u with dermatology for routine skin checks.   -     CBC Auto Differential; Future  -     Comprehensive Metabolic Panel; Future  -     Lipid Panel; Future  -     TSH; Future  -     Vitamin B12 Deficiency Panel; Future  -     Magnesium; Future  -     Vitamin D; Future  -     ZINC; Future  -     VITAMIN B1; Future    Fatty liver- will repeat LFTs  -     CBC Auto Differential; Future  -     Comprehensive Metabolic Panel; Future  -     Lipid Panel; Future  -     TSH; Future  -     Vitamin B12 Deficiency Panel; Future  -     Magnesium; Future  -     Vitamin D; Future  -     ZINC; Future  -     VITAMIN B1; Future    Gastroesophageal reflux disease without esophagitis- stable. Will cont PPI as needed   -     CBC Auto Differential; Future  -     Comprehensive Metabolic Panel; Future  -     Lipid Panel; Future  -     TSH; Future  -     Vitamin B12 Deficiency Panel; Future  -     Magnesium; Future  -     Vitamin D; Future  -     ZINC; Future  -     VITAMIN B1; Future    Fatigue, unspecified type- check vitamin Levels.   -     CBC Auto Differential; Future  -     Comprehensive Metabolic Panel; Future  -     Lipid Panel; Future  -     TSH; Future  -     Vitamin B12 Deficiency Panel; Future  -     Magnesium; Future  -     Vitamin D; Future  -     ZINC; Future  -     VITAMIN B1; Future    Screening for malnutrition  -     CBC Auto Differential; Future  -     Comprehensive Metabolic Panel; Future  -     Lipid Panel; Future  -      TSH; Future  -     Vitamin B12 Deficiency Panel; Future  -     Magnesium; Future  -     Vitamin D; Future  -     ZINC; Future  -     VITAMIN B1; Future    Encounter for long-term (current) use of other medications  -     CBC Auto Differential; Future  -     Comprehensive Metabolic Panel; Future  -     Lipid Panel; Future  -     TSH; Future  -     Vitamin B12 Deficiency Panel; Future  -     Magnesium; Future  -     Vitamin D; Future  -     ZINC; Future  -     VITAMIN B1; Future    Vitamin disease  -     CBC Auto Differential; Future  -     Comprehensive Metabolic Panel; Future  -     Lipid Panel; Future  -     TSH; Future  -     Vitamin B12 Deficiency Panel; Future  -     Magnesium; Future  -     Vitamin D; Future  -     ZINC; Future  -     VITAMIN B1; Future    Screening for thyroid disorder  -     CBC Auto Differential; Future  -     Comprehensive Metabolic Panel; Future  -     Lipid Panel; Future  -     TSH; Future  -     Vitamin B12 Deficiency Panel; Future  -     Magnesium; Future  -     Vitamin D; Future  -     ZINC; Future  -     VITAMIN B1; Future    Screening for lipoid disorders  -     CBC Auto Differential; Future  -     Comprehensive Metabolic Panel; Future  -     Lipid Panel; Future  -     TSH; Future  -     Vitamin B12 Deficiency Panel; Future  -     Magnesium; Future  -     Vitamin D; Future  -     ZINC; Future  -     VITAMIN B1; Future    Abnormal endocrine laboratory test finding  -     CBC Auto Differential; Future  -     Comprehensive Metabolic Panel; Future  -     Lipid Panel; Future  -     TSH; Future  -     Vitamin B12 Deficiency Panel; Future  -     Magnesium; Future  -     Vitamin D; Future  -     ZINC; Future  -     VITAMIN B1; Future    Screening for iron deficiency anemia  -     CBC Auto Differential; Future  -     Comprehensive Metabolic Panel; Future  -     Lipid Panel; Future  -     TSH; Future  -     Vitamin B12 Deficiency Panel; Future  -     Magnesium; Future  -     Vitamin D;  Future  -     ZINC; Future  -     VITAMIN B1; Future           Patient is cleared for anesthesia and surgery. Instructions for optimization of medical problems were given. All over the counter medications are to be stopped one week prior to surgery.    Special instructions:     Lab Results   Component Value Date    WBC 10.62 07/21/2021    HGB 14.3 07/21/2021    HCT 43.0 07/21/2021     07/21/2021    CHOL 193 12/14/2021    TRIG 93 12/14/2021    HDL 56 12/14/2021    ALT 38 07/21/2021    AST 21 07/21/2021     07/21/2021    K 4.1 12/14/2021     07/21/2021    CREATININE 0.8 07/21/2021    BUN 12 07/21/2021    CO2 27 07/21/2021    TSH 1.013 07/21/2021    HGBA1C 5.1 07/21/2021        Lab Results   Component Value Date    ALT 38 07/21/2021    AST 21 07/21/2021    ALKPHOS 57 07/21/2021    BILITOT 0.8 07/21/2021        Anabel ARAGON, APRN, FNP-c  Nurse Practitioner   Internal Medicine   [address] [facility] [id number] [phone]

## 2022-03-18 ENCOUNTER — LAB VISIT (OUTPATIENT)
Dept: LAB | Facility: HOSPITAL | Age: 37
End: 2022-03-18
Attending: NURSE PRACTITIONER
Payer: COMMERCIAL

## 2022-03-18 DIAGNOSIS — K76.0 FATTY LIVER: ICD-10-CM

## 2022-03-18 DIAGNOSIS — Z79.899 ENCOUNTER FOR LONG-TERM (CURRENT) USE OF OTHER MEDICATIONS: ICD-10-CM

## 2022-03-18 DIAGNOSIS — C43.61 MALIGNANT MELANOMA OF RIGHT SHOULDER: ICD-10-CM

## 2022-03-18 DIAGNOSIS — K21.9 GASTROESOPHAGEAL REFLUX DISEASE WITHOUT ESOPHAGITIS: ICD-10-CM

## 2022-03-18 DIAGNOSIS — C43.71 MALIGNANT MELANOMA OF RIGHT LOWER EXTREMITY: ICD-10-CM

## 2022-03-18 DIAGNOSIS — Z13.21 SCREENING FOR MALNUTRITION: ICD-10-CM

## 2022-03-18 DIAGNOSIS — E66.01 CLASS 2 SEVERE OBESITY DUE TO EXCESS CALORIES WITH SERIOUS COMORBIDITY AND BODY MASS INDEX (BMI) OF 35.0 TO 35.9 IN ADULT: ICD-10-CM

## 2022-03-18 DIAGNOSIS — R53.83 FATIGUE, UNSPECIFIED TYPE: ICD-10-CM

## 2022-03-18 DIAGNOSIS — Z13.29 SCREENING FOR THYROID DISORDER: ICD-10-CM

## 2022-03-18 DIAGNOSIS — R68.89 ABNORMAL ENDOCRINE LABORATORY TEST FINDING: ICD-10-CM

## 2022-03-18 DIAGNOSIS — E78.2 MIXED HYPERLIPIDEMIA: ICD-10-CM

## 2022-03-18 DIAGNOSIS — I10 ESSENTIAL HYPERTENSION: ICD-10-CM

## 2022-03-18 DIAGNOSIS — Z13.220 SCREENING FOR LIPOID DISORDERS: ICD-10-CM

## 2022-03-18 DIAGNOSIS — Z13.0 SCREENING FOR IRON DEFICIENCY ANEMIA: ICD-10-CM

## 2022-03-18 LAB
25(OH)D3+25(OH)D2 SERPL-MCNC: 29 NG/ML (ref 30–96)
ALBUMIN SERPL BCP-MCNC: 4 G/DL (ref 3.5–5.2)
ALP SERPL-CCNC: 48 U/L (ref 55–135)
ALT SERPL W/O P-5'-P-CCNC: 29 U/L (ref 10–44)
ANION GAP SERPL CALC-SCNC: 6 MMOL/L (ref 8–16)
AST SERPL-CCNC: 23 U/L (ref 10–40)
BASOPHILS # BLD AUTO: 0.04 K/UL (ref 0–0.2)
BASOPHILS NFR BLD: 0.5 % (ref 0–1.9)
BILIRUB SERPL-MCNC: 0.6 MG/DL (ref 0.1–1)
BUN SERPL-MCNC: 11 MG/DL (ref 6–20)
CALCIUM SERPL-MCNC: 9.2 MG/DL (ref 8.7–10.5)
CHLORIDE SERPL-SCNC: 109 MMOL/L (ref 95–110)
CHOLEST SERPL-MCNC: 162 MG/DL (ref 120–199)
CHOLEST/HDLC SERPL: 3.6 {RATIO} (ref 2–5)
CO2 SERPL-SCNC: 25 MMOL/L (ref 23–29)
CREAT SERPL-MCNC: 0.7 MG/DL (ref 0.5–1.4)
DIFFERENTIAL METHOD: NORMAL
EOSINOPHIL # BLD AUTO: 0.1 K/UL (ref 0–0.5)
EOSINOPHIL NFR BLD: 1.6 % (ref 0–8)
ERYTHROCYTE [DISTWIDTH] IN BLOOD BY AUTOMATED COUNT: 11.7 % (ref 11.5–14.5)
EST. GFR  (AFRICAN AMERICAN): >60 ML/MIN/1.73 M^2
EST. GFR  (NON AFRICAN AMERICAN): >60 ML/MIN/1.73 M^2
GLUCOSE SERPL-MCNC: 102 MG/DL (ref 70–110)
HCT VFR BLD AUTO: 39.2 % (ref 37–48.5)
HDLC SERPL-MCNC: 45 MG/DL (ref 40–75)
HDLC SERPL: 27.8 % (ref 20–50)
HGB BLD-MCNC: 13.2 G/DL (ref 12–16)
IMM GRANULOCYTES # BLD AUTO: 0.01 K/UL (ref 0–0.04)
IMM GRANULOCYTES NFR BLD AUTO: 0.1 % (ref 0–0.5)
LDLC SERPL CALC-MCNC: 104.2 MG/DL (ref 63–159)
LYMPHOCYTES # BLD AUTO: 3.6 K/UL (ref 1–4.8)
LYMPHOCYTES NFR BLD: 43.3 % (ref 18–48)
MAGNESIUM SERPL-MCNC: 2.1 MG/DL (ref 1.6–2.6)
MCH RBC QN AUTO: 29.9 PG (ref 27–31)
MCHC RBC AUTO-ENTMCNC: 33.7 G/DL (ref 32–36)
MCV RBC AUTO: 89 FL (ref 82–98)
MONOCYTES # BLD AUTO: 0.5 K/UL (ref 0.3–1)
MONOCYTES NFR BLD: 5.6 % (ref 4–15)
NEUTROPHILS # BLD AUTO: 4.1 K/UL (ref 1.8–7.7)
NEUTROPHILS NFR BLD: 48.9 % (ref 38–73)
NONHDLC SERPL-MCNC: 117 MG/DL
NRBC BLD-RTO: 0 /100 WBC
PLATELET # BLD AUTO: 326 K/UL (ref 150–450)
PMV BLD AUTO: 10.7 FL (ref 9.2–12.9)
POTASSIUM SERPL-SCNC: 3.9 MMOL/L (ref 3.5–5.1)
PROT SERPL-MCNC: 7.1 G/DL (ref 6–8.4)
RBC # BLD AUTO: 4.41 M/UL (ref 4–5.4)
SODIUM SERPL-SCNC: 140 MMOL/L (ref 136–145)
TRIGL SERPL-MCNC: 64 MG/DL (ref 30–150)
TSH SERPL DL<=0.005 MIU/L-ACNC: 1.17 UIU/ML (ref 0.4–4)
WBC # BLD AUTO: 8.27 K/UL (ref 3.9–12.7)

## 2022-03-18 PROCEDURE — 80053 COMPREHEN METABOLIC PANEL: CPT | Performed by: NURSE PRACTITIONER

## 2022-03-18 PROCEDURE — 82306 VITAMIN D 25 HYDROXY: CPT | Performed by: NURSE PRACTITIONER

## 2022-03-18 PROCEDURE — 80061 LIPID PANEL: CPT | Performed by: NURSE PRACTITIONER

## 2022-03-18 PROCEDURE — 84630 ASSAY OF ZINC: CPT | Performed by: NURSE PRACTITIONER

## 2022-03-18 PROCEDURE — 84443 ASSAY THYROID STIM HORMONE: CPT | Performed by: NURSE PRACTITIONER

## 2022-03-18 PROCEDURE — 84425 ASSAY OF VITAMIN B-1: CPT | Performed by: NURSE PRACTITIONER

## 2022-03-18 PROCEDURE — 85025 COMPLETE CBC W/AUTO DIFF WBC: CPT | Performed by: NURSE PRACTITIONER

## 2022-03-18 PROCEDURE — 82607 VITAMIN B-12: CPT | Performed by: NURSE PRACTITIONER

## 2022-03-18 PROCEDURE — 83735 ASSAY OF MAGNESIUM: CPT | Performed by: NURSE PRACTITIONER

## 2022-03-18 PROCEDURE — 36415 COLL VENOUS BLD VENIPUNCTURE: CPT | Performed by: NURSE PRACTITIONER

## 2022-03-21 LAB
VIT B12 SERPL-MCNC: 494 NG/L (ref 180–914)
ZINC SERPL-MCNC: 70 UG/DL (ref 60–130)

## 2022-03-23 LAB — VIT B1 BLD-MCNC: 59 UG/L (ref 38–122)

## 2022-07-16 ENCOUNTER — PATIENT MESSAGE (OUTPATIENT)
Dept: ADMINISTRATIVE | Facility: OTHER | Age: 37
End: 2022-07-16
Payer: COMMERCIAL

## 2022-08-03 ENCOUNTER — PATIENT MESSAGE (OUTPATIENT)
Dept: BARIATRICS | Facility: CLINIC | Age: 37
End: 2022-08-03
Payer: COMMERCIAL

## 2022-08-12 ENCOUNTER — LAB VISIT (OUTPATIENT)
Dept: LAB | Facility: HOSPITAL | Age: 37
End: 2022-08-12
Attending: INTERNAL MEDICINE
Payer: COMMERCIAL

## 2022-08-12 DIAGNOSIS — Z13.0 SCREENING, ANEMIA, DEFICIENCY, IRON: ICD-10-CM

## 2022-08-12 DIAGNOSIS — Z13.1 SCREENING FOR DIABETES MELLITUS: ICD-10-CM

## 2022-08-12 DIAGNOSIS — Z13.29 SCREENING FOR THYROID DISORDER: ICD-10-CM

## 2022-08-12 DIAGNOSIS — E78.2 MIXED HYPERLIPIDEMIA: ICD-10-CM

## 2022-08-12 DIAGNOSIS — I10 ESSENTIAL HYPERTENSION: ICD-10-CM

## 2022-08-12 LAB
ALBUMIN SERPL BCP-MCNC: 4.1 G/DL (ref 3.5–5.2)
ALP SERPL-CCNC: 50 U/L (ref 55–135)
ALT SERPL W/O P-5'-P-CCNC: 48 U/L (ref 10–44)
ANION GAP SERPL CALC-SCNC: 12 MMOL/L (ref 8–16)
AST SERPL-CCNC: 35 U/L (ref 10–40)
BASOPHILS # BLD AUTO: 0.02 K/UL (ref 0–0.2)
BASOPHILS NFR BLD: 0.3 % (ref 0–1.9)
BILIRUB SERPL-MCNC: 1.3 MG/DL (ref 0.1–1)
BUN SERPL-MCNC: 9 MG/DL (ref 6–20)
CALCIUM SERPL-MCNC: 9.2 MG/DL (ref 8.7–10.5)
CHLORIDE SERPL-SCNC: 109 MMOL/L (ref 95–110)
CHOLEST SERPL-MCNC: 231 MG/DL (ref 120–199)
CHOLEST/HDLC SERPL: 4.4 {RATIO} (ref 2–5)
CO2 SERPL-SCNC: 19 MMOL/L (ref 23–29)
CREAT SERPL-MCNC: 0.6 MG/DL (ref 0.5–1.4)
DIFFERENTIAL METHOD: NORMAL
EOSINOPHIL # BLD AUTO: 0.1 K/UL (ref 0–0.5)
EOSINOPHIL NFR BLD: 1.2 % (ref 0–8)
ERYTHROCYTE [DISTWIDTH] IN BLOOD BY AUTOMATED COUNT: 12.4 % (ref 11.5–14.5)
EST. GFR  (NO RACE VARIABLE): >60 ML/MIN/1.73 M^2
ESTIMATED AVG GLUCOSE: 88 MG/DL (ref 68–131)
GLUCOSE SERPL-MCNC: 82 MG/DL (ref 70–110)
HBA1C MFR BLD: 4.7 % (ref 4–5.6)
HCT VFR BLD AUTO: 39.6 % (ref 37–48.5)
HDLC SERPL-MCNC: 53 MG/DL (ref 40–75)
HDLC SERPL: 22.9 % (ref 20–50)
HGB BLD-MCNC: 13.5 G/DL (ref 12–16)
IMM GRANULOCYTES # BLD AUTO: 0.01 K/UL (ref 0–0.04)
IMM GRANULOCYTES NFR BLD AUTO: 0.2 % (ref 0–0.5)
LDLC SERPL CALC-MCNC: 151.2 MG/DL (ref 63–159)
LYMPHOCYTES # BLD AUTO: 2.9 K/UL (ref 1–4.8)
LYMPHOCYTES NFR BLD: 45.4 % (ref 18–48)
MCH RBC QN AUTO: 29.9 PG (ref 27–31)
MCHC RBC AUTO-ENTMCNC: 34.1 G/DL (ref 32–36)
MCV RBC AUTO: 88 FL (ref 82–98)
MONOCYTES # BLD AUTO: 0.4 K/UL (ref 0.3–1)
MONOCYTES NFR BLD: 6.4 % (ref 4–15)
NEUTROPHILS # BLD AUTO: 3 K/UL (ref 1.8–7.7)
NEUTROPHILS NFR BLD: 46.5 % (ref 38–73)
NONHDLC SERPL-MCNC: 178 MG/DL
NRBC BLD-RTO: 0 /100 WBC
PLATELET # BLD AUTO: 204 K/UL (ref 150–450)
PMV BLD AUTO: 12 FL (ref 9.2–12.9)
POTASSIUM SERPL-SCNC: 4.2 MMOL/L (ref 3.5–5.1)
PROT SERPL-MCNC: 7 G/DL (ref 6–8.4)
RBC # BLD AUTO: 4.52 M/UL (ref 4–5.4)
SODIUM SERPL-SCNC: 140 MMOL/L (ref 136–145)
TRIGL SERPL-MCNC: 134 MG/DL (ref 30–150)
TSH SERPL DL<=0.005 MIU/L-ACNC: 1.6 UIU/ML (ref 0.4–4)
WBC # BLD AUTO: 6.41 K/UL (ref 3.9–12.7)

## 2022-08-12 PROCEDURE — 80053 COMPREHEN METABOLIC PANEL: CPT | Performed by: INTERNAL MEDICINE

## 2022-08-12 PROCEDURE — 84443 ASSAY THYROID STIM HORMONE: CPT | Performed by: INTERNAL MEDICINE

## 2022-08-12 PROCEDURE — 36415 COLL VENOUS BLD VENIPUNCTURE: CPT | Performed by: INTERNAL MEDICINE

## 2022-08-12 PROCEDURE — 85025 COMPLETE CBC W/AUTO DIFF WBC: CPT | Performed by: INTERNAL MEDICINE

## 2022-08-12 PROCEDURE — 80061 LIPID PANEL: CPT | Performed by: INTERNAL MEDICINE

## 2022-08-12 PROCEDURE — 83036 HEMOGLOBIN GLYCOSYLATED A1C: CPT | Performed by: INTERNAL MEDICINE

## 2022-08-22 ENCOUNTER — OFFICE VISIT (OUTPATIENT)
Dept: INTERNAL MEDICINE | Facility: CLINIC | Age: 37
End: 2022-08-22
Payer: COMMERCIAL

## 2022-08-22 ENCOUNTER — IMMUNIZATION (OUTPATIENT)
Dept: PHARMACY | Facility: CLINIC | Age: 37
End: 2022-08-22
Payer: COMMERCIAL

## 2022-08-22 VITALS
BODY MASS INDEX: 32.1 KG/M2 | WEIGHT: 192.69 LBS | HEIGHT: 65 IN | SYSTOLIC BLOOD PRESSURE: 128 MMHG | DIASTOLIC BLOOD PRESSURE: 78 MMHG | HEART RATE: 69 BPM | OXYGEN SATURATION: 99 %

## 2022-08-22 DIAGNOSIS — K76.0 FATTY LIVER: ICD-10-CM

## 2022-08-22 DIAGNOSIS — Z90.3 H/O GASTRIC SLEEVE: ICD-10-CM

## 2022-08-22 DIAGNOSIS — C43.71 MALIGNANT MELANOMA OF RIGHT LOWER EXTREMITY: ICD-10-CM

## 2022-08-22 DIAGNOSIS — Z11.59 NEED FOR HEPATITIS C SCREENING TEST: ICD-10-CM

## 2022-08-22 DIAGNOSIS — Z11.4 SCREENING FOR HIV (HUMAN IMMUNODEFICIENCY VIRUS): ICD-10-CM

## 2022-08-22 DIAGNOSIS — Z23 NEED FOR VACCINATION: Primary | ICD-10-CM

## 2022-08-22 DIAGNOSIS — Z00.00 VISIT FOR ANNUAL HEALTH EXAMINATION: Primary | ICD-10-CM

## 2022-08-22 DIAGNOSIS — E78.2 MIXED HYPERLIPIDEMIA: ICD-10-CM

## 2022-08-22 DIAGNOSIS — E66.09 CLASS 1 OBESITY DUE TO EXCESS CALORIES WITH SERIOUS COMORBIDITY AND BODY MASS INDEX (BMI) OF 32.0 TO 32.9 IN ADULT: ICD-10-CM

## 2022-08-22 DIAGNOSIS — I10 ESSENTIAL HYPERTENSION: ICD-10-CM

## 2022-08-22 DIAGNOSIS — K21.9 GASTROESOPHAGEAL REFLUX DISEASE WITHOUT ESOPHAGITIS: ICD-10-CM

## 2022-08-22 PROCEDURE — 99999 PR PBB SHADOW E&M-EST. PATIENT-LVL IV: CPT | Mod: PBBFAC,,, | Performed by: INTERNAL MEDICINE

## 2022-08-22 PROCEDURE — 99999 PR PBB SHADOW E&M-EST. PATIENT-LVL IV: ICD-10-PCS | Mod: PBBFAC,,, | Performed by: INTERNAL MEDICINE

## 2022-08-22 PROCEDURE — 3078F DIAST BP <80 MM HG: CPT | Mod: CPTII,S$GLB,, | Performed by: INTERNAL MEDICINE

## 2022-08-22 PROCEDURE — 3074F PR MOST RECENT SYSTOLIC BLOOD PRESSURE < 130 MM HG: ICD-10-PCS | Mod: CPTII,S$GLB,, | Performed by: INTERNAL MEDICINE

## 2022-08-22 PROCEDURE — 3008F PR BODY MASS INDEX (BMI) DOCUMENTED: ICD-10-PCS | Mod: CPTII,S$GLB,, | Performed by: INTERNAL MEDICINE

## 2022-08-22 PROCEDURE — 1159F PR MEDICATION LIST DOCUMENTED IN MEDICAL RECORD: ICD-10-PCS | Mod: CPTII,S$GLB,, | Performed by: INTERNAL MEDICINE

## 2022-08-22 PROCEDURE — 3074F SYST BP LT 130 MM HG: CPT | Mod: CPTII,S$GLB,, | Performed by: INTERNAL MEDICINE

## 2022-08-22 PROCEDURE — 1160F PR REVIEW ALL MEDS BY PRESCRIBER/CLIN PHARMACIST DOCUMENTED: ICD-10-PCS | Mod: CPTII,S$GLB,, | Performed by: INTERNAL MEDICINE

## 2022-08-22 PROCEDURE — 99395 PREV VISIT EST AGE 18-39: CPT | Mod: S$GLB,,, | Performed by: INTERNAL MEDICINE

## 2022-08-22 PROCEDURE — 3008F BODY MASS INDEX DOCD: CPT | Mod: CPTII,S$GLB,, | Performed by: INTERNAL MEDICINE

## 2022-08-22 PROCEDURE — 3078F PR MOST RECENT DIASTOLIC BLOOD PRESSURE < 80 MM HG: ICD-10-PCS | Mod: CPTII,S$GLB,, | Performed by: INTERNAL MEDICINE

## 2022-08-22 PROCEDURE — 99395 PR PREVENTIVE VISIT,EST,18-39: ICD-10-PCS | Mod: S$GLB,,, | Performed by: INTERNAL MEDICINE

## 2022-08-22 PROCEDURE — 1159F MED LIST DOCD IN RCRD: CPT | Mod: CPTII,S$GLB,, | Performed by: INTERNAL MEDICINE

## 2022-08-22 PROCEDURE — 3044F HG A1C LEVEL LT 7.0%: CPT | Mod: CPTII,S$GLB,, | Performed by: INTERNAL MEDICINE

## 2022-08-22 PROCEDURE — 3044F PR MOST RECENT HEMOGLOBIN A1C LEVEL <7.0%: ICD-10-PCS | Mod: CPTII,S$GLB,, | Performed by: INTERNAL MEDICINE

## 2022-08-22 PROCEDURE — 1160F RVW MEDS BY RX/DR IN RCRD: CPT | Mod: CPTII,S$GLB,, | Performed by: INTERNAL MEDICINE

## 2022-08-22 NOTE — PROGRESS NOTES
INTERNAL MEDICINE ESTABLISHED PATIENT VISIT NOTE    Subjective:     Chief Complaint: Annual Exam       Patient ID: Katarina Munguia is a 37 y.o. female with HTN, HLD, obesity s/p gastric sleeve 5/2022, fatty liver, GERD, hx latent TB s/p tx for 6 mos, hx malignant melanoma of mid back, R shoulder and then R ankle followed by Dr. Howard, last seen by me a year ago, here today for annual exam and lab results.    Since last visit was seen by Hepatology for fatty liver.  Had fibroscan done in Dec c F0-1, Steatosis Grade S3.    Had gastric sleeve done in May by Dr. Rhina Moya.  Was previously on Chlorthalidone and Amlodipine for HTN and followed by [facility] but states Dr. Moya took her off bp and cholesterol meds.  Home pressures have been much lower in the 100s to 110s so no longer enrolled in [facility] program.     Weight down about 30 lbs since her surgery.  Feeling much better overall.  Denies issues c n/v or diarrhea.  Exercises regularly and feels she is doing well c diet and healthy eating.    Past Medical History:  Past Medical History:   Diagnosis Date    Allergy     Dysplastic nevus 06/21/2021    r upper arm    Fatty liver disease, nonalcoholic     Hypertension     Melanoma 2006    back    Melanoma 11/2019    R ankle with Dr. Ferrell with [facility]    Melanoma 11/2020    R shoulder excised at [facility] Medications:  Prior to Admission medications    Medication Sig Start Date End Date Taking? Authorizing Provider   amLODIPine (NORVASC) 5 MG tablet Take 1 tablet (5 mg total) by mouth once daily. 3/14/22   Priti Mckoy MD   atorvastatin (LIPITOR) 10 MG tablet Take 1 tablet (10 mg total) by mouth once daily. 2/7/22   Priti Mckoy MD   chlorthalidone (HYGROTEN) 25 MG Tab Take 0.5 tablets (12.5 mg total) by mouth once daily. 3/14/22   Priti Mckoy MD   flu vacc [id number] 6mos up,PF, 60 mcg (15 mcg x 4)/0.5 mL Syrg inject into the muscle 12/14/21   Cheri Garza, PharmD   fluticasone propionate (FLONASE) 50  "mcg/actuation nasal spray PUT 1 SPRAY BY EACH NOSTRIL ROUTE 2 TIMES DAILY. 1/10/22   Priti Mckoy MD   multivitamin capsule Take 1 capsule by mouth once daily.    Historical Provider   pantoprazole (PROTONIX) 40 MG tablet Take 1 tablet (40 mg total) by mouth once daily. 2/1/22   Katty Garcia MD   pneumococcal 23-char ps (PNEUMOVAX 23) 25 mcg/0.5 mL vaccine Inject into the muscle. 5/20/22   Priti Mckoy MD       Allergies:  Review of patient's allergies indicates:  No Known Allergies    Social History:  Social History     Tobacco Use    Smoking status: Never Smoker    Smokeless tobacco: Never Used   Substance Use Topics    Alcohol use: Yes     Alcohol/week: 1.0 standard drink     Types: 1 Cans of beer per week     Comment: occasional    Drug use: No        Review of Systems   Constitutional: Negative for appetite change, chills, fatigue, fever and unexpected weight change.   HENT: Negative for congestion, hearing loss and rhinorrhea.    Eyes: Negative for pain and visual disturbance.   Respiratory: Negative for cough, chest tightness, shortness of breath and wheezing.    Cardiovascular: Negative for chest pain, palpitations and leg swelling.   Gastrointestinal: Negative for abdominal distention and abdominal pain.   Endocrine: Negative for polydipsia and polyuria.   Genitourinary: Negative for decreased urine volume, difficulty urinating, dysuria, hematuria and vaginal discharge.   Neurological: Negative for weakness, numbness and headaches.   Psychiatric/Behavioral: Negative for behavioral problems and confusion.         Health Maintenance:     Immunizations:   Influenza 12/2021  TDap 4/2021  Prevnar rec 11/2020, pneumovax rec but not covered by insurance  Shingrix rec at 51  Moderna covid vaccines completed 3/2021, 4/2021, booster rec today.     Cancer Screening:  PAP: 12/2020 NILM sees Dr. Mas  Mammogram:  rec at 41  Colonoscopy:  rec at 46    Objective:   /78   Pulse 69   Ht 5' 5" (1.651 m)   Wt " 87.4 kg (192 lb 10.9 oz)   LMP 08/11/2022 (Exact Date)   SpO2 99%   BMI 32.06 kg/m²        General: AAO x3, no apparent distress  HEENT: PERRL, OP clear  CV: RRR, no m/r/g  Pulm: Lungs CTAB, no crackles, no wheezes  Abd: s/NT/ND +BS  Extremities: no c/c/e    Labs:     Lab Results   Component Value Date    WBC 6.41 08/12/2022    HGB 13.5 08/12/2022    HCT 39.6 08/12/2022    MCV 88 08/12/2022     08/12/2022     Sodium   Date Value Ref Range Status   08/12/2022 140 136 - 145 mmol/L Final     Potassium   Date Value Ref Range Status   08/12/2022 4.2 3.5 - 5.1 mmol/L Final     Comment:     Specimen slightly hemolyzed     Chloride   Date Value Ref Range Status   08/12/2022 109 95 - 110 mmol/L Final     CO2   Date Value Ref Range Status   08/12/2022 19 (L) 23 - 29 mmol/L Final     Glucose   Date Value Ref Range Status   08/12/2022 82 70 - 110 mg/dL Final     BUN   Date Value Ref Range Status   08/12/2022 9 6 - 20 mg/dL Final     Creatinine   Date Value Ref Range Status   08/12/2022 0.6 0.5 - 1.4 mg/dL Final     Calcium   Date Value Ref Range Status   08/12/2022 9.2 8.7 - 10.5 mg/dL Final     Total Protein   Date Value Ref Range Status   08/12/2022 7.0 6.0 - 8.4 g/dL Final     Albumin   Date Value Ref Range Status   08/12/2022 4.1 3.5 - 5.2 g/dL Final     Total Bilirubin   Date Value Ref Range Status   08/12/2022 1.3 (H) 0.1 - 1.0 mg/dL Final     Comment:     For infants and newborns, interpretation of results should be based  on gestational age, weight and in agreement with clinical  observations.    Premature Infant recommended reference ranges:  Up to 24 hours.............<8.0 mg/dL  Up to 48 hours............<12.0 mg/dL  3-5 days..................<15.0 mg/dL  6-29 days.................<15.0 mg/dL       Alkaline Phosphatase   Date Value Ref Range Status   08/12/2022 50 (L) 55 - 135 U/L Final     AST   Date Value Ref Range Status   08/12/2022 35 10 - 40 U/L Final     ALT   Date Value Ref Range Status   08/12/2022  48 (H) 10 - 44 U/L Final     Anion Gap   Date Value Ref Range Status   08/12/2022 12 8 - 16 mmol/L Final     eGFR if    Date Value Ref Range Status   03/18/2022 >60 >60 mL/min/1.73 m^2 Final     eGFR if non    Date Value Ref Range Status   03/18/2022 >60 >60 mL/min/1.73 m^2 Final     Comment:     Calculation used to obtain the estimated glomerular filtration  rate (eGFR) is the CKD-EPI equation.        Lab Results   Component Value Date    HGBA1C 4.7 08/12/2022     Lab Results   Component Value Date    LDLCALC 151.2 08/12/2022     Lab Results   Component Value Date    TSH 1.604 08/12/2022         Assessment/Plan     Katarina was seen today for annual exam.    Diagnoses and all orders for this visit:    Visit for annual health examination  History and physical exam completed.  Health maintenance reviewed as above.  -     Comprehensive Metabolic Panel; Future  -     Lipid Panel; Future    H/O gastric sleeve  Class 1 obesity due to excess calories with serious comorbidity and body mass index (BMI) of 32.0 to 32.9 in adult  Doing well c wt loss  No acute issues  Cont lifestyle modifications.  -     Vitamin D; Future  -     Vitamin B12; Future  -     Iron and TIBC; Future  -     Ferritin; Future    Essential hypertension  Previously on meds, now diet controlled  Repeat manual at goal and home checks much lower.  Advised to message me in a week or so with home BP readings.    Mixed hyperlipidemia  Lab Results   Component Value Date    LDLCALC 151.2 08/12/2022     Worse on recent labs since off statin but do not feel resuming statin necessary based on numbers, age, and RF.  Will cont wt loss and low fat diet and reassess in 6 mos.    Fatty liver  Recent LFTs a little worse  Advised slight fluctuations can be normal c fatty liver, not drinking alcohol or taking Tyelnol  Will cont wt loss and repeat labs in 6 mos.    Gastroesophageal reflux disease without esophagitis  Stable, takes PPI prn  No  acute issues    Malignant melanoma of right lower extremity  Derm check c Dr. Lee vasquez  Seen last week, had bx done c path pending.    Need for hepatitis C screening test  -     HEPATITIS C ANTIBODY; Future    Screening for HIV (human immunodeficiency virus)  -     HIV 1/2 Ag/Ab (4th Gen); Future      HM as above  RTC in 6 mos c fasting labs one week prior, sooner if needed.    Priti Mckoy MD  Department of Internal Medicine - [facility]  08/22/2022

## 2022-08-26 ENCOUNTER — PATIENT MESSAGE (OUTPATIENT)
Dept: INTERNAL MEDICINE | Facility: CLINIC | Age: 37
End: 2022-08-26
Payer: COMMERCIAL

## 2022-08-30 ENCOUNTER — PATIENT MESSAGE (OUTPATIENT)
Dept: INTERNAL MEDICINE | Facility: CLINIC | Age: 37
End: 2022-08-30
Payer: COMMERCIAL

## 2022-10-06 ENCOUNTER — PATIENT MESSAGE (OUTPATIENT)
Dept: BARIATRICS | Facility: CLINIC | Age: 37
End: 2022-10-06
Payer: COMMERCIAL

## 2023-01-14 ENCOUNTER — PATIENT MESSAGE (OUTPATIENT)
Dept: ADMINISTRATIVE | Facility: OTHER | Age: 38
End: 2023-01-14
Payer: COMMERCIAL

## 2023-02-15 ENCOUNTER — LAB VISIT (OUTPATIENT)
Dept: LAB | Facility: HOSPITAL | Age: 38
End: 2023-02-15
Attending: INTERNAL MEDICINE
Payer: COMMERCIAL

## 2023-02-15 DIAGNOSIS — Z11.4 SCREENING FOR HIV (HUMAN IMMUNODEFICIENCY VIRUS): ICD-10-CM

## 2023-02-15 DIAGNOSIS — Z00.00 VISIT FOR ANNUAL HEALTH EXAMINATION: ICD-10-CM

## 2023-02-15 DIAGNOSIS — Z11.59 NEED FOR HEPATITIS C SCREENING TEST: ICD-10-CM

## 2023-02-15 DIAGNOSIS — Z90.3 H/O GASTRIC SLEEVE: ICD-10-CM

## 2023-02-15 LAB
25(OH)D3+25(OH)D2 SERPL-MCNC: 34 NG/ML (ref 30–96)
ALBUMIN SERPL BCP-MCNC: 3.9 G/DL (ref 3.5–5.2)
ALP SERPL-CCNC: 40 U/L (ref 55–135)
ALT SERPL W/O P-5'-P-CCNC: 16 U/L (ref 10–44)
ANION GAP SERPL CALC-SCNC: 7 MMOL/L (ref 8–16)
AST SERPL-CCNC: 14 U/L (ref 10–40)
BILIRUB SERPL-MCNC: 0.9 MG/DL (ref 0.1–1)
BUN SERPL-MCNC: 10 MG/DL (ref 6–20)
CALCIUM SERPL-MCNC: 8.8 MG/DL (ref 8.7–10.5)
CHLORIDE SERPL-SCNC: 108 MMOL/L (ref 95–110)
CHOLEST SERPL-MCNC: 202 MG/DL (ref 120–199)
CHOLEST/HDLC SERPL: 3.6 {RATIO} (ref 2–5)
CO2 SERPL-SCNC: 26 MMOL/L (ref 23–29)
CREAT SERPL-MCNC: 0.7 MG/DL (ref 0.5–1.4)
EST. GFR  (NO RACE VARIABLE): >60 ML/MIN/1.73 M^2
FERRITIN SERPL-MCNC: 18 NG/ML (ref 20–300)
GLUCOSE SERPL-MCNC: 86 MG/DL (ref 70–110)
HCV AB SERPL QL IA: NORMAL
HDLC SERPL-MCNC: 56 MG/DL (ref 40–75)
HDLC SERPL: 27.7 % (ref 20–50)
HIV 1+2 AB+HIV1 P24 AG SERPL QL IA: NORMAL
IRON SERPL-MCNC: 105 UG/DL (ref 30–160)
LDLC SERPL CALC-MCNC: 135.4 MG/DL (ref 63–159)
NONHDLC SERPL-MCNC: 146 MG/DL
POTASSIUM SERPL-SCNC: 3.9 MMOL/L (ref 3.5–5.1)
PROT SERPL-MCNC: 6.6 G/DL (ref 6–8.4)
SATURATED IRON: 33 % (ref 20–50)
SODIUM SERPL-SCNC: 141 MMOL/L (ref 136–145)
TOTAL IRON BINDING CAPACITY: 314 UG/DL (ref 250–450)
TRANSFERRIN SERPL-MCNC: 212 MG/DL (ref 200–375)
TRIGL SERPL-MCNC: 53 MG/DL (ref 30–150)
VIT B12 SERPL-MCNC: 795 PG/ML (ref 210–950)

## 2023-02-15 PROCEDURE — 82306 VITAMIN D 25 HYDROXY: CPT | Performed by: INTERNAL MEDICINE

## 2023-02-15 PROCEDURE — 36415 COLL VENOUS BLD VENIPUNCTURE: CPT | Performed by: INTERNAL MEDICINE

## 2023-02-15 PROCEDURE — 80053 COMPREHEN METABOLIC PANEL: CPT | Performed by: INTERNAL MEDICINE

## 2023-02-15 PROCEDURE — 82728 ASSAY OF FERRITIN: CPT | Performed by: INTERNAL MEDICINE

## 2023-02-15 PROCEDURE — 82607 VITAMIN B-12: CPT | Performed by: INTERNAL MEDICINE

## 2023-02-15 PROCEDURE — 80061 LIPID PANEL: CPT | Performed by: INTERNAL MEDICINE

## 2023-02-15 PROCEDURE — 86803 HEPATITIS C AB TEST: CPT | Performed by: INTERNAL MEDICINE

## 2023-02-15 PROCEDURE — 87389 HIV-1 AG W/HIV-1&-2 AB AG IA: CPT | Performed by: INTERNAL MEDICINE

## 2023-02-15 PROCEDURE — 84466 ASSAY OF TRANSFERRIN: CPT | Performed by: INTERNAL MEDICINE

## 2023-02-22 ENCOUNTER — PATIENT MESSAGE (OUTPATIENT)
Dept: BARIATRICS | Facility: CLINIC | Age: 38
End: 2023-02-22
Payer: COMMERCIAL

## 2023-02-27 ENCOUNTER — OFFICE VISIT (OUTPATIENT)
Dept: INTERNAL MEDICINE | Facility: CLINIC | Age: 38
End: 2023-02-27
Payer: COMMERCIAL

## 2023-02-27 ENCOUNTER — IMMUNIZATION (OUTPATIENT)
Dept: PHARMACY | Facility: CLINIC | Age: 38
End: 2023-02-27
Payer: COMMERCIAL

## 2023-02-27 VITALS
HEIGHT: 65 IN | BODY MASS INDEX: 29.42 KG/M2 | OXYGEN SATURATION: 98 % | WEIGHT: 176.56 LBS | HEART RATE: 60 BPM | DIASTOLIC BLOOD PRESSURE: 76 MMHG | SYSTOLIC BLOOD PRESSURE: 120 MMHG

## 2023-02-27 DIAGNOSIS — E78.2 MIXED HYPERLIPIDEMIA: ICD-10-CM

## 2023-02-27 DIAGNOSIS — E66.3 OVERWEIGHT (BMI 25.0-29.9): ICD-10-CM

## 2023-02-27 DIAGNOSIS — I10 ESSENTIAL HYPERTENSION: Primary | ICD-10-CM

## 2023-02-27 DIAGNOSIS — Z85.820 HISTORY OF MELANOMA: ICD-10-CM

## 2023-02-27 DIAGNOSIS — K62.5 BRBPR (BRIGHT RED BLOOD PER RECTUM): ICD-10-CM

## 2023-02-27 DIAGNOSIS — Z13.1 SCREENING FOR DIABETES MELLITUS: ICD-10-CM

## 2023-02-27 DIAGNOSIS — K76.0 FATTY LIVER: ICD-10-CM

## 2023-02-27 DIAGNOSIS — Z23 NEED FOR VACCINATION: Primary | ICD-10-CM

## 2023-02-27 DIAGNOSIS — K21.9 GASTROESOPHAGEAL REFLUX DISEASE WITHOUT ESOPHAGITIS: ICD-10-CM

## 2023-02-27 DIAGNOSIS — Z90.3 S/P GASTRIC SLEEVE PROCEDURE: ICD-10-CM

## 2023-02-27 DIAGNOSIS — E61.1 IRON DEFICIENCY: ICD-10-CM

## 2023-02-27 PROBLEM — R10.13 DYSPEPSIA: Status: RESOLVED | Noted: 2019-11-29 | Resolved: 2023-02-27

## 2023-02-27 PROBLEM — C43.71 MALIGNANT MELANOMA OF RIGHT LOWER EXTREMITY: Status: RESOLVED | Noted: 2019-11-25 | Resolved: 2023-02-27

## 2023-02-27 PROCEDURE — 3074F PR MOST RECENT SYSTOLIC BLOOD PRESSURE < 130 MM HG: ICD-10-PCS | Mod: CPTII,S$GLB,, | Performed by: INTERNAL MEDICINE

## 2023-02-27 PROCEDURE — 1159F PR MEDICATION LIST DOCUMENTED IN MEDICAL RECORD: ICD-10-PCS | Mod: CPTII,S$GLB,, | Performed by: INTERNAL MEDICINE

## 2023-02-27 PROCEDURE — 3078F DIAST BP <80 MM HG: CPT | Mod: CPTII,S$GLB,, | Performed by: INTERNAL MEDICINE

## 2023-02-27 PROCEDURE — 3078F PR MOST RECENT DIASTOLIC BLOOD PRESSURE < 80 MM HG: ICD-10-PCS | Mod: CPTII,S$GLB,, | Performed by: INTERNAL MEDICINE

## 2023-02-27 PROCEDURE — 99999 PR PBB SHADOW E&M-EST. PATIENT-LVL IV: CPT | Mod: PBBFAC,,, | Performed by: INTERNAL MEDICINE

## 2023-02-27 PROCEDURE — 1159F MED LIST DOCD IN RCRD: CPT | Mod: CPTII,S$GLB,, | Performed by: INTERNAL MEDICINE

## 2023-02-27 PROCEDURE — 1160F RVW MEDS BY RX/DR IN RCRD: CPT | Mod: CPTII,S$GLB,, | Performed by: INTERNAL MEDICINE

## 2023-02-27 PROCEDURE — 3074F SYST BP LT 130 MM HG: CPT | Mod: CPTII,S$GLB,, | Performed by: INTERNAL MEDICINE

## 2023-02-27 PROCEDURE — 3008F PR BODY MASS INDEX (BMI) DOCUMENTED: ICD-10-PCS | Mod: CPTII,S$GLB,, | Performed by: INTERNAL MEDICINE

## 2023-02-27 PROCEDURE — 1160F PR REVIEW ALL MEDS BY PRESCRIBER/CLIN PHARMACIST DOCUMENTED: ICD-10-PCS | Mod: CPTII,S$GLB,, | Performed by: INTERNAL MEDICINE

## 2023-02-27 PROCEDURE — 99214 OFFICE O/P EST MOD 30 MIN: CPT | Mod: S$GLB,,, | Performed by: INTERNAL MEDICINE

## 2023-02-27 PROCEDURE — 3008F BODY MASS INDEX DOCD: CPT | Mod: CPTII,S$GLB,, | Performed by: INTERNAL MEDICINE

## 2023-02-27 PROCEDURE — 99214 PR OFFICE/OUTPT VISIT, EST, LEVL IV, 30-39 MIN: ICD-10-PCS | Mod: S$GLB,,, | Performed by: INTERNAL MEDICINE

## 2023-02-27 PROCEDURE — 99999 PR PBB SHADOW E&M-EST. PATIENT-LVL IV: ICD-10-PCS | Mod: PBBFAC,,, | Performed by: INTERNAL MEDICINE

## 2023-02-27 NOTE — PATIENT INSTRUCTIONS
Ferrous sulfate 325 mg once a day.  Start a daily stool softener.    If persistent blood in your stools with the daily stool softener, let Dr. Mckoy know, and she will refer you to the colorectal surgeon.    Sitz baths (soak in warm water with Epsom salt)

## 2023-07-21 ENCOUNTER — PATIENT MESSAGE (OUTPATIENT)
Dept: INTERNAL MEDICINE | Facility: CLINIC | Age: 38
End: 2023-07-21
Payer: COMMERCIAL

## 2023-08-23 ENCOUNTER — LAB VISIT (OUTPATIENT)
Dept: LAB | Facility: HOSPITAL | Age: 38
End: 2023-08-23
Attending: INTERNAL MEDICINE
Payer: COMMERCIAL

## 2023-08-23 DIAGNOSIS — E66.3 OVERWEIGHT (BMI 25.0-29.9): ICD-10-CM

## 2023-08-23 DIAGNOSIS — Z13.1 SCREENING FOR DIABETES MELLITUS: ICD-10-CM

## 2023-08-23 DIAGNOSIS — K62.5 BRBPR (BRIGHT RED BLOOD PER RECTUM): ICD-10-CM

## 2023-08-23 DIAGNOSIS — E61.1 IRON DEFICIENCY: ICD-10-CM

## 2023-08-23 DIAGNOSIS — K76.0 FATTY LIVER: ICD-10-CM

## 2023-08-23 DIAGNOSIS — E78.2 MIXED HYPERLIPIDEMIA: ICD-10-CM

## 2023-08-23 LAB
ALBUMIN SERPL BCP-MCNC: 3.9 G/DL (ref 3.5–5.2)
ALP SERPL-CCNC: 40 U/L (ref 55–135)
ALT SERPL W/O P-5'-P-CCNC: 14 U/L (ref 10–44)
ANION GAP SERPL CALC-SCNC: 6 MMOL/L (ref 8–16)
AST SERPL-CCNC: 13 U/L (ref 10–40)
BASOPHILS # BLD AUTO: 0.03 K/UL (ref 0–0.2)
BASOPHILS NFR BLD: 0.6 % (ref 0–1.9)
BILIRUB SERPL-MCNC: 1 MG/DL (ref 0.1–1)
BUN SERPL-MCNC: 11 MG/DL (ref 6–20)
CALCIUM SERPL-MCNC: 8.7 MG/DL (ref 8.7–10.5)
CHLORIDE SERPL-SCNC: 106 MMOL/L (ref 95–110)
CHOLEST SERPL-MCNC: 199 MG/DL (ref 120–199)
CHOLEST/HDLC SERPL: 3.1 {RATIO} (ref 2–5)
CO2 SERPL-SCNC: 26 MMOL/L (ref 23–29)
CREAT SERPL-MCNC: 0.7 MG/DL (ref 0.5–1.4)
DIFFERENTIAL METHOD: ABNORMAL
EOSINOPHIL # BLD AUTO: 0.1 K/UL (ref 0–0.5)
EOSINOPHIL NFR BLD: 1.5 % (ref 0–8)
ERYTHROCYTE [DISTWIDTH] IN BLOOD BY AUTOMATED COUNT: 12.3 % (ref 11.5–14.5)
EST. GFR  (NO RACE VARIABLE): >60 ML/MIN/1.73 M^2
ESTIMATED AVG GLUCOSE: 91 MG/DL (ref 68–131)
FERRITIN SERPL-MCNC: 11 NG/ML (ref 20–300)
GLUCOSE SERPL-MCNC: 86 MG/DL (ref 70–110)
HBA1C MFR BLD: 4.8 % (ref 4–5.6)
HCT VFR BLD AUTO: 37.1 % (ref 37–48.5)
HDLC SERPL-MCNC: 64 MG/DL (ref 40–75)
HDLC SERPL: 32.2 % (ref 20–50)
HGB BLD-MCNC: 12.1 G/DL (ref 12–16)
IMM GRANULOCYTES # BLD AUTO: 0.01 K/UL (ref 0–0.04)
IMM GRANULOCYTES NFR BLD AUTO: 0.2 % (ref 0–0.5)
IRON SERPL-MCNC: 101 UG/DL (ref 30–160)
LDLC SERPL CALC-MCNC: 121.4 MG/DL (ref 63–159)
LYMPHOCYTES # BLD AUTO: 2.9 K/UL (ref 1–4.8)
LYMPHOCYTES NFR BLD: 53.3 % (ref 18–48)
MCH RBC QN AUTO: 29.4 PG (ref 27–31)
MCHC RBC AUTO-ENTMCNC: 32.6 G/DL (ref 32–36)
MCV RBC AUTO: 90 FL (ref 82–98)
MONOCYTES # BLD AUTO: 0.3 K/UL (ref 0.3–1)
MONOCYTES NFR BLD: 5.4 % (ref 4–15)
NEUTROPHILS # BLD AUTO: 2.1 K/UL (ref 1.8–7.7)
NEUTROPHILS NFR BLD: 39 % (ref 38–73)
NONHDLC SERPL-MCNC: 135 MG/DL
NRBC BLD-RTO: 0 /100 WBC
PLATELET # BLD AUTO: 235 K/UL (ref 150–450)
PMV BLD AUTO: 11.2 FL (ref 9.2–12.9)
POTASSIUM SERPL-SCNC: 4.2 MMOL/L (ref 3.5–5.1)
PROT SERPL-MCNC: 6.5 G/DL (ref 6–8.4)
RBC # BLD AUTO: 4.12 M/UL (ref 4–5.4)
SATURATED IRON: 30 % (ref 20–50)
SODIUM SERPL-SCNC: 138 MMOL/L (ref 136–145)
TOTAL IRON BINDING CAPACITY: 334 UG/DL (ref 250–450)
TRANSFERRIN SERPL-MCNC: 226 MG/DL (ref 200–375)
TRIGL SERPL-MCNC: 68 MG/DL (ref 30–150)
TSH SERPL DL<=0.005 MIU/L-ACNC: 1.6 UIU/ML (ref 0.4–4)
WBC # BLD AUTO: 5.38 K/UL (ref 3.9–12.7)

## 2023-08-23 PROCEDURE — 83540 ASSAY OF IRON: CPT | Performed by: INTERNAL MEDICINE

## 2023-08-23 PROCEDURE — 83036 HEMOGLOBIN GLYCOSYLATED A1C: CPT | Performed by: INTERNAL MEDICINE

## 2023-08-23 PROCEDURE — 84466 ASSAY OF TRANSFERRIN: CPT | Performed by: INTERNAL MEDICINE

## 2023-08-23 PROCEDURE — 84443 ASSAY THYROID STIM HORMONE: CPT | Performed by: INTERNAL MEDICINE

## 2023-08-23 PROCEDURE — 80061 LIPID PANEL: CPT | Performed by: INTERNAL MEDICINE

## 2023-08-23 PROCEDURE — 80053 COMPREHEN METABOLIC PANEL: CPT | Performed by: INTERNAL MEDICINE

## 2023-08-23 PROCEDURE — 36415 COLL VENOUS BLD VENIPUNCTURE: CPT | Performed by: INTERNAL MEDICINE

## 2023-08-23 PROCEDURE — 82728 ASSAY OF FERRITIN: CPT | Performed by: INTERNAL MEDICINE

## 2023-08-23 PROCEDURE — 85025 COMPLETE CBC W/AUTO DIFF WBC: CPT | Performed by: INTERNAL MEDICINE

## 2023-08-28 ENCOUNTER — OFFICE VISIT (OUTPATIENT)
Dept: INTERNAL MEDICINE | Facility: CLINIC | Age: 38
End: 2023-08-28
Payer: COMMERCIAL

## 2023-08-28 VITALS
SYSTOLIC BLOOD PRESSURE: 112 MMHG | WEIGHT: 175.06 LBS | BODY MASS INDEX: 29.17 KG/M2 | OXYGEN SATURATION: 99 % | HEIGHT: 65 IN | DIASTOLIC BLOOD PRESSURE: 60 MMHG | HEART RATE: 53 BPM

## 2023-08-28 DIAGNOSIS — I10 ESSENTIAL HYPERTENSION: ICD-10-CM

## 2023-08-28 DIAGNOSIS — Z85.820 HISTORY OF MELANOMA: ICD-10-CM

## 2023-08-28 DIAGNOSIS — Z00.00 VISIT FOR ANNUAL HEALTH EXAMINATION: Primary | ICD-10-CM

## 2023-08-28 DIAGNOSIS — J30.89 SEASONAL ALLERGIC RHINITIS DUE TO OTHER ALLERGIC TRIGGER: ICD-10-CM

## 2023-08-28 DIAGNOSIS — Z90.3 S/P GASTRIC SLEEVE PROCEDURE: ICD-10-CM

## 2023-08-28 DIAGNOSIS — K21.9 GASTROESOPHAGEAL REFLUX DISEASE WITHOUT ESOPHAGITIS: ICD-10-CM

## 2023-08-28 DIAGNOSIS — K76.0 FATTY LIVER: ICD-10-CM

## 2023-08-28 DIAGNOSIS — E61.1 IRON DEFICIENCY: ICD-10-CM

## 2023-08-28 DIAGNOSIS — E78.2 MIXED HYPERLIPIDEMIA: ICD-10-CM

## 2023-08-28 PROCEDURE — 1160F RVW MEDS BY RX/DR IN RCRD: CPT | Mod: CPTII,S$GLB,, | Performed by: INTERNAL MEDICINE

## 2023-08-28 PROCEDURE — 1159F MED LIST DOCD IN RCRD: CPT | Mod: CPTII,S$GLB,, | Performed by: INTERNAL MEDICINE

## 2023-08-28 PROCEDURE — 1159F PR MEDICATION LIST DOCUMENTED IN MEDICAL RECORD: ICD-10-PCS | Mod: CPTII,S$GLB,, | Performed by: INTERNAL MEDICINE

## 2023-08-28 PROCEDURE — 3074F SYST BP LT 130 MM HG: CPT | Mod: CPTII,S$GLB,, | Performed by: INTERNAL MEDICINE

## 2023-08-28 PROCEDURE — 99999 PR PBB SHADOW E&M-EST. PATIENT-LVL III: ICD-10-PCS | Mod: PBBFAC,,, | Performed by: INTERNAL MEDICINE

## 2023-08-28 PROCEDURE — 99395 PREV VISIT EST AGE 18-39: CPT | Mod: S$GLB,,, | Performed by: INTERNAL MEDICINE

## 2023-08-28 PROCEDURE — 3008F PR BODY MASS INDEX (BMI) DOCUMENTED: ICD-10-PCS | Mod: CPTII,S$GLB,, | Performed by: INTERNAL MEDICINE

## 2023-08-28 PROCEDURE — 1160F PR REVIEW ALL MEDS BY PRESCRIBER/CLIN PHARMACIST DOCUMENTED: ICD-10-PCS | Mod: CPTII,S$GLB,, | Performed by: INTERNAL MEDICINE

## 2023-08-28 PROCEDURE — 99999 PR PBB SHADOW E&M-EST. PATIENT-LVL III: CPT | Mod: PBBFAC,,, | Performed by: INTERNAL MEDICINE

## 2023-08-28 PROCEDURE — 3008F BODY MASS INDEX DOCD: CPT | Mod: CPTII,S$GLB,, | Performed by: INTERNAL MEDICINE

## 2023-08-28 PROCEDURE — 3078F PR MOST RECENT DIASTOLIC BLOOD PRESSURE < 80 MM HG: ICD-10-PCS | Mod: CPTII,S$GLB,, | Performed by: INTERNAL MEDICINE

## 2023-08-28 PROCEDURE — 3044F HG A1C LEVEL LT 7.0%: CPT | Mod: CPTII,S$GLB,, | Performed by: INTERNAL MEDICINE

## 2023-08-28 PROCEDURE — 99395 PR PREVENTIVE VISIT,EST,18-39: ICD-10-PCS | Mod: S$GLB,,, | Performed by: INTERNAL MEDICINE

## 2023-08-28 PROCEDURE — 3044F PR MOST RECENT HEMOGLOBIN A1C LEVEL <7.0%: ICD-10-PCS | Mod: CPTII,S$GLB,, | Performed by: INTERNAL MEDICINE

## 2023-08-28 PROCEDURE — 3078F DIAST BP <80 MM HG: CPT | Mod: CPTII,S$GLB,, | Performed by: INTERNAL MEDICINE

## 2023-08-28 PROCEDURE — 3074F PR MOST RECENT SYSTOLIC BLOOD PRESSURE < 130 MM HG: ICD-10-PCS | Mod: CPTII,S$GLB,, | Performed by: INTERNAL MEDICINE

## 2023-08-28 RX ORDER — FLUTICASONE PROPIONATE 50 MCG
1 SPRAY, SUSPENSION (ML) NASAL DAILY
Qty: 48 ML | Refills: 6 | Status: SHIPPED | OUTPATIENT
Start: 2023-08-28

## 2023-08-28 NOTE — PROGRESS NOTES
[facility] ESTABLISHED PATIENT VISIT NOTE    Subjective:     Chief Complaint: Annual Exam       Patient ID: Katarina Munguia is a 38 y.o. female with HTN, HLD, obesity s/p gastric sleeve 5/2022, fatty liver, GERD, hx latent TB s/p tx for 6 mos, hx malignant melanoma of mid back, R shoulder and then R ankle followed by Dr. Howard, last seen by me in Feb, here today for routine annual exam.    Had lost weight following bariatric surgery but numbers have leveled out since Feb.  Taking bariatric supplements/vitamins as recommended and feeling well overall.  Still exercising regularly, does weights and cardio.    Now engaged.  Cycles monthly still, heavy and followed by gyn for this.    Past Medical History:  Past Medical History:   Diagnosis Date    Allergy     Dysplastic nevus 06/21/2021    r upper arm    Fatty liver disease, nonalcoholic     Hypertension     Melanoma 2006    back    Melanoma 11/2019    R ankle with Dr. Ferrell with [facility]    Melanoma 11/2020    R shoulder excised at [facility] Medications:  Prior to Admission medications    Medication Sig Start Date End Date Taking? Authorizing Provider   fluticasone propionate (FLONASE) 50 mcg/actuation nasal spray 1 spray (50 mcg total) by Each Nostril route once daily. 11/4/22   Soraya Garcia MD   multivitamin capsule Take 1 capsule by mouth once daily.    Provider, Historical       Allergies:  Review of patient's allergies indicates:  No Known Allergies    Social History:  Social History     Tobacco Use    Smoking status: Never    Smokeless tobacco: Never   Substance Use Topics    Alcohol use: Yes     Alcohol/week: 1.0 standard drink of alcohol     Types: 1 Cans of beer per week     Comment: occasional    Drug use: No        Review of Systems   Constitutional:  Negative for appetite change, chills, fatigue, fever and unexpected weight change.   HENT:  Negative for congestion, hearing loss and rhinorrhea.    Eyes:  Negative for pain and visual  "disturbance.   Respiratory:  Negative for cough, chest tightness, shortness of breath and wheezing.    Cardiovascular:  Negative for chest pain, palpitations and leg swelling.   Gastrointestinal:  Negative for abdominal distention and abdominal pain.   Endocrine: Negative for polydipsia and polyuria.   Genitourinary:  Negative for decreased urine volume, difficulty urinating, dysuria, hematuria and vaginal discharge.   Neurological:  Negative for weakness, numbness and headaches.   Psychiatric/Behavioral:  Negative for behavioral problems and confusion.          Health Maintenance:     Immunizations:   Influenza 11/2022, rec repeat this Fall  TDap 4/2021  Prevnar rec 11/2020, pneumovax rec but not covered by insurance  Shingrix rec at 51  Moderna covid vaccines completed 3/2021, 4/2021, 8/2022, 2/2023, rec updated booster once available.     Cancer Screening:  PAP: 1/2023 NILM seebelia Mas  Mammogram:  rec at 41  Colonoscopy:  rec at 46    Objective:   /60 (BP Location: Right arm, Patient Position: Sitting, BP Method: Medium (Manual))   Pulse (!) 53   Ht 5' 5" (1.651 m)   Wt 79.4 kg (175 lb 0.7 oz)   LMP 08/11/2023   SpO2 99%   BMI 29.13 kg/m²        General: AAO x3, no apparent distress  HEENT: no cervical LAD, no thyroid masses appreciated.  CV: RRR, no m/r/g  Pulm: Lungs CTAB, no crackles, no wheezes  Abd: s/NT/ND +BS  Extremities: no c/c/e    Labs:     Lab Results   Component Value Date    WBC 5.38 08/23/2023    HGB 12.1 08/23/2023    HCT 37.1 08/23/2023    MCV 90 08/23/2023     08/23/2023     Sodium   Date Value Ref Range Status   08/23/2023 138 136 - 145 mmol/L Final     Potassium   Date Value Ref Range Status   08/23/2023 4.2 3.5 - 5.1 mmol/L Final     Chloride   Date Value Ref Range Status   08/23/2023 106 95 - 110 mmol/L Final     CO2   Date Value Ref Range Status   08/23/2023 26 23 - 29 mmol/L Final     Glucose   Date Value Ref Range Status   08/23/2023 86 70 - 110 mg/dL Final "     BUN   Date Value Ref Range Status   08/23/2023 11 6 - 20 mg/dL Final     Creatinine   Date Value Ref Range Status   08/23/2023 0.7 0.5 - 1.4 mg/dL Final     Calcium   Date Value Ref Range Status   08/23/2023 8.7 8.7 - 10.5 mg/dL Final     Total Protein   Date Value Ref Range Status   08/23/2023 6.5 6.0 - 8.4 g/dL Final     Albumin   Date Value Ref Range Status   08/23/2023 3.9 3.5 - 5.2 g/dL Final     Total Bilirubin   Date Value Ref Range Status   08/23/2023 1.0 0.1 - 1.0 mg/dL Final     Comment:     For infants and newborns, interpretation of results should be based  on gestational age, weight and in agreement with clinical  observations.    Premature Infant recommended reference ranges:  Up to 24 hours.............<8.0 mg/dL  Up to 48 hours............<12.0 mg/dL  3-5 days..................<15.0 mg/dL  6-29 days.................<15.0 mg/dL       Alkaline Phosphatase   Date Value Ref Range Status   08/23/2023 40 (L) 55 - 135 U/L Final     AST   Date Value Ref Range Status   08/23/2023 13 10 - 40 U/L Final     ALT   Date Value Ref Range Status   08/23/2023 14 10 - 44 U/L Final     Anion Gap   Date Value Ref Range Status   08/23/2023 6 (L) 8 - 16 mmol/L Final     eGFR if    Date Value Ref Range Status   03/18/2022 >60 >60 mL/min/1.73 m^2 Final     eGFR if non    Date Value Ref Range Status   03/18/2022 >60 >60 mL/min/1.73 m^2 Final     Comment:     Calculation used to obtain the estimated glomerular filtration  rate (eGFR) is the CKD-EPI equation.        Lab Results   Component Value Date    HGBA1C 4.8 08/23/2023     Lab Results   Component Value Date    LDLCALC 121.4 08/23/2023     Lab Results   Component Value Date    TSH 1.605 08/23/2023         Assessment/Plan     Katarina was seen today for annual exam.    Diagnoses and all orders for this visit:    Visit for annual health examination  History and physical exam completed.  Health maintenance reviewed as above.    Essential  hypertension  Now diet controlled since losing weight  No issues  Cont lifestyle modifications.    Mixed hyperlipidemia  Lab Results   Component Value Date    LDLCALC 121.4 08/23/2023     Improved since last check and not req meds    Iron deficiency  Likely multifactorial from menses and prev bariatric surgery  Takes bariatric MVI  Did not tolerate Ferrous sulfate due to GI s/e, can try ferrous gluconate instead and take every other day c food     Fatty liver  No issues  Doing well c wt loss    Gastroesophageal reflux disease without esophagitis  Stable, no issues    Seasonal allergic rhinitis due to other allergic trigger  Stable on current regimen, refills sent  -     fluticasone propionate (FLONASE) 50 mcg/actuation nasal spray; 1 spray (50 mcg total) by Each Nostril route once daily.    History of melanoma  No issues, sees Dr. Howard q3 mos for skin checks    S/P gastric sleeve procedure  Wt stablizing, doing well overall  Cont healthy eating and exercise.      HM as above  RTC in 1 year since BP and chol normalized c wt loss, sooner if needed.    Priti Mckoy MD  Department of Internal Medicine - [facility]  08/28/2023

## 2023-08-28 NOTE — PATIENT INSTRUCTIONS
CHANGE IRON SUPPLEMENT TO FERROUS GLUCONATE 324 MG DAILY AND TAKE WITH FOOD.    CALL DR LOCKHART'S OFFICE IN FEB TO SCHEDULE YOUR ANNUAL FOR AUG AND ASK FOR LABS ONE WEEK PRIOR

## 2023-12-17 ENCOUNTER — OFFICE VISIT (OUTPATIENT)
Dept: URGENT CARE | Facility: CLINIC | Age: 38
End: 2023-12-17
Payer: COMMERCIAL

## 2023-12-17 VITALS
BODY MASS INDEX: 29.16 KG/M2 | TEMPERATURE: 98 F | DIASTOLIC BLOOD PRESSURE: 89 MMHG | OXYGEN SATURATION: 98 % | SYSTOLIC BLOOD PRESSURE: 128 MMHG | HEIGHT: 65 IN | RESPIRATION RATE: 18 BRPM | WEIGHT: 175 LBS | HEART RATE: 62 BPM

## 2023-12-17 DIAGNOSIS — J02.9 SORE THROAT: Primary | ICD-10-CM

## 2023-12-17 DIAGNOSIS — J06.9 VIRAL URI WITH COUGH: ICD-10-CM

## 2023-12-17 LAB
CTP QC/QA: YES
MOLECULAR STREP A: NEGATIVE

## 2023-12-17 PROCEDURE — 87651 POCT STREP A MOLECULAR: ICD-10-PCS | Mod: QW,S$GLB,, | Performed by: FAMILY MEDICINE

## 2023-12-17 PROCEDURE — 99203 PR OFFICE/OUTPT VISIT, NEW, LEVL III, 30-44 MIN: ICD-10-PCS | Mod: S$GLB,,, | Performed by: FAMILY MEDICINE

## 2023-12-17 PROCEDURE — 87651 STREP A DNA AMP PROBE: CPT | Mod: QW,S$GLB,, | Performed by: FAMILY MEDICINE

## 2023-12-17 PROCEDURE — 99203 OFFICE O/P NEW LOW 30 MIN: CPT | Mod: S$GLB,,, | Performed by: FAMILY MEDICINE

## 2023-12-17 RX ORDER — PROMETHAZINE HYDROCHLORIDE AND DEXTROMETHORPHAN HYDROBROMIDE 6.25; 15 MG/5ML; MG/5ML
5 SYRUP ORAL EVERY 8 HOURS PRN
Qty: 180 ML | Refills: 0 | Status: SHIPPED | OUTPATIENT
Start: 2023-12-17 | End: 2023-12-27

## 2023-12-17 RX ORDER — PREDNISONE 20 MG/1
40 TABLET ORAL DAILY
Qty: 10 TABLET | Refills: 0 | Status: SHIPPED | OUTPATIENT
Start: 2023-12-17 | End: 2023-12-22

## 2023-12-17 NOTE — PROGRESS NOTES
"Subjective:      Patient ID: Katarina Munguia is a 38 y.o. female.    Vitals:  height is 5' 5" (1.651 m) and weight is 79.4 kg (175 lb). Her oral temperature is 98.3 °F (36.8 °C). Her blood pressure is 128/89 and her pulse is 62. Her respiration is 18 and oxygen saturation is 98%.     Chief Complaint: Sore Throat    This is a 38 y.o. female who presents today with a chief complaint of sore throat.    Sore Throat   This is a new problem. The current episode started in the past 7 days (12/15/2023). The problem has been unchanged. There has been no fever. Associated symptoms include coughing, headaches, a hoarse voice, swollen glands and trouble swallowing. She has tried nothing for the symptoms.       HENT:  Positive for sore throat and trouble swallowing.    Respiratory:  Positive for cough.    Neurological:  Positive for headaches.      Objective:     Physical Exam   Constitutional: She is oriented to person, place, and time. She appears well-developed. She is cooperative.  Non-toxic appearance. She does not appear ill. No distress.   HENT:   Head: Normocephalic and atraumatic.   Ears:   Right Ear: Hearing, tympanic membrane and external ear normal.   Left Ear: Hearing, tympanic membrane and external ear normal.   Nose: Nose normal. No mucosal edema, rhinorrhea or nasal deformity. No epistaxis. Right sinus exhibits no maxillary sinus tenderness and no frontal sinus tenderness. Left sinus exhibits no maxillary sinus tenderness and no frontal sinus tenderness.   Mouth/Throat: Uvula is midline, oropharynx is clear and moist and mucous membranes are normal. No trismus in the jaw. Normal dentition. No uvula swelling. No oropharyngeal exudate, posterior oropharyngeal edema or posterior oropharyngeal erythema.   Eyes: Conjunctivae and lids are normal. No scleral icterus.   Neck: Trachea normal and phonation normal. Neck supple. No edema present. No erythema present. No neck rigidity present.   Cardiovascular: Normal rate, " regular rhythm, normal heart sounds and normal pulses.   Pulmonary/Chest: Effort normal and breath sounds normal. No respiratory distress. She has no decreased breath sounds. She has no rhonchi.   Abdominal: Normal appearance.   Musculoskeletal: Normal range of motion.         General: No deformity. Normal range of motion.   Neurological: She is alert and oriented to person, place, and time. She exhibits normal muscle tone. Coordination normal.   Skin: Skin is warm, dry, intact, not diaphoretic and not pale.   Psychiatric: Her speech is normal and behavior is normal. Judgment and thought content normal.   Nursing note and vitals reviewed.      Assessment:     1. Sore throat    2. Viral URI with cough        Plan:       Sore throat  -     POCT Strep A, Molecular    Viral URI with cough  -     predniSONE (DELTASONE) 20 MG tablet; Take 2 tablets (40 mg total) by mouth once daily. for 5 days  Dispense: 10 tablet; Refill: 0  -     promethazine-dextromethorphan (PROMETHAZINE-DM) 6.25-15 mg/5 mL Syrp; Take 5 mLs by mouth every 8 (eight) hours as needed.  Dispense: 180 mL; Refill: 0      Thank you for choosing [facility]!     Our goal in the Urgent Care is to always provide outstanding medical care. You may receive a survey by mail or e-mail in the next week regarding your experience today. We would greatly appreciate you completing and returning the survey. Your feedback provides us with a way to recognize our staff who provide very good care, and it helps us learn how to improve when your experience was below our aspiration of excellence.       We appreciate you trusting us with your medical care. We hope you feel better soon. We will be happy to take care of you for all of your future medical needs.  You must understand that you've received an Urgent Care treatment only and that you may be released before all your medical problems are known or treated. You, the patient, will arrange for follow up care as  instructed.  Follow up with your PCP or specialty clinic as directed in the next 1-2 weeks if not improved or as needed.  You can call [phone] to schedule an appointment with the appropriate provider.  Another option is to follow up with [facility] ([email or web address]) virtually for quick simple medical advice.  If your condition worsens we recommend that you receive another evaluation at the emergency room immediately or contact your primary medical clinics after hours call service to discuss your concerns.  Please return here or go to the Emergency Department for any concerns or worsening of condition.      *If you were prescribed a narcotic or controlled medication, do not drive or operate heavy equipment or machinery while taking these medications.

## 2024-01-03 ENCOUNTER — PATIENT MESSAGE (OUTPATIENT)
Dept: ADMINISTRATIVE | Facility: OTHER | Age: 39
End: 2024-01-03
Payer: COMMERCIAL

## 2024-03-19 ENCOUNTER — PATIENT MESSAGE (OUTPATIENT)
Dept: ADMINISTRATIVE | Facility: OTHER | Age: 39
End: 2024-03-19
Payer: COMMERCIAL

## 2024-06-10 ENCOUNTER — PATIENT MESSAGE (OUTPATIENT)
Dept: INTERNAL MEDICINE | Facility: CLINIC | Age: 39
End: 2024-06-10
Payer: COMMERCIAL

## 2024-07-28 ENCOUNTER — PATIENT MESSAGE (OUTPATIENT)
Dept: ADMINISTRATIVE | Facility: OTHER | Age: 39
End: 2024-07-28
Payer: COMMERCIAL

## 2024-08-08 ENCOUNTER — PATIENT MESSAGE (OUTPATIENT)
Dept: INTERNAL MEDICINE | Facility: CLINIC | Age: 39
End: 2024-08-08
Payer: COMMERCIAL

## 2024-08-08 ENCOUNTER — TELEPHONE (OUTPATIENT)
Dept: INTERNAL MEDICINE | Facility: CLINIC | Age: 39
End: 2024-08-08
Payer: COMMERCIAL

## 2024-08-08 DIAGNOSIS — Z00.00 VISIT FOR ANNUAL HEALTH EXAMINATION: Primary | ICD-10-CM

## 2024-08-08 DIAGNOSIS — E78.2 MIXED HYPERLIPIDEMIA: ICD-10-CM

## 2024-08-08 DIAGNOSIS — E61.1 IRON DEFICIENCY: ICD-10-CM

## 2024-08-08 DIAGNOSIS — I10 ESSENTIAL HYPERTENSION: ICD-10-CM

## 2024-08-09 ENCOUNTER — LAB VISIT (OUTPATIENT)
Dept: LAB | Facility: HOSPITAL | Age: 39
End: 2024-08-09
Attending: NURSE PRACTITIONER
Payer: COMMERCIAL

## 2024-08-09 DIAGNOSIS — I10 ESSENTIAL HYPERTENSION: ICD-10-CM

## 2024-08-09 DIAGNOSIS — E61.1 IRON DEFICIENCY: ICD-10-CM

## 2024-08-09 DIAGNOSIS — E78.2 MIXED HYPERLIPIDEMIA: ICD-10-CM

## 2024-08-09 DIAGNOSIS — Z00.00 VISIT FOR ANNUAL HEALTH EXAMINATION: ICD-10-CM

## 2024-08-09 LAB
ALBUMIN SERPL BCP-MCNC: 3.9 G/DL (ref 3.5–5.2)
ALP SERPL-CCNC: 31 U/L (ref 55–135)
ALT SERPL W/O P-5'-P-CCNC: 9 U/L (ref 10–44)
ANION GAP SERPL CALC-SCNC: 13 MMOL/L (ref 8–16)
AST SERPL-CCNC: 15 U/L (ref 10–40)
BASOPHILS # BLD AUTO: 0.03 K/UL (ref 0–0.2)
BASOPHILS NFR BLD: 0.6 % (ref 0–1.9)
BILIRUB SERPL-MCNC: 0.7 MG/DL (ref 0.1–1)
BUN SERPL-MCNC: 11 MG/DL (ref 6–20)
CALCIUM SERPL-MCNC: 9.1 MG/DL (ref 8.7–10.5)
CHLORIDE SERPL-SCNC: 106 MMOL/L (ref 95–110)
CHOLEST SERPL-MCNC: 211 MG/DL (ref 120–199)
CHOLEST/HDLC SERPL: 3.1 {RATIO} (ref 2–5)
CO2 SERPL-SCNC: 20 MMOL/L (ref 23–29)
CREAT SERPL-MCNC: 0.7 MG/DL (ref 0.5–1.4)
DIFFERENTIAL METHOD BLD: ABNORMAL
EOSINOPHIL # BLD AUTO: 0 K/UL (ref 0–0.5)
EOSINOPHIL NFR BLD: 0.8 % (ref 0–8)
ERYTHROCYTE [DISTWIDTH] IN BLOOD BY AUTOMATED COUNT: 15 % (ref 11.5–14.5)
EST. GFR  (NO RACE VARIABLE): >60 ML/MIN/1.73 M^2
ESTIMATED AVG GLUCOSE: 97 MG/DL (ref 68–131)
FERRITIN SERPL-MCNC: 5 NG/ML (ref 20–300)
GLUCOSE SERPL-MCNC: 76 MG/DL (ref 70–110)
HBA1C MFR BLD: 5 % (ref 4–5.6)
HCT VFR BLD AUTO: 32.3 % (ref 37–48.5)
HDLC SERPL-MCNC: 69 MG/DL (ref 40–75)
HDLC SERPL: 32.7 % (ref 20–50)
HGB BLD-MCNC: 9.8 G/DL (ref 12–16)
IMM GRANULOCYTES # BLD AUTO: 0 K/UL (ref 0–0.04)
IMM GRANULOCYTES NFR BLD AUTO: 0 % (ref 0–0.5)
IRON SERPL-MCNC: 43 UG/DL (ref 30–160)
LDLC SERPL CALC-MCNC: 128.2 MG/DL (ref 63–159)
LYMPHOCYTES # BLD AUTO: 2.2 K/UL (ref 1–4.8)
LYMPHOCYTES NFR BLD: 43.8 % (ref 18–48)
MCH RBC QN AUTO: 25.4 PG (ref 27–31)
MCHC RBC AUTO-ENTMCNC: 30.3 G/DL (ref 32–36)
MCV RBC AUTO: 84 FL (ref 82–98)
MONOCYTES # BLD AUTO: 0.4 K/UL (ref 0.3–1)
MONOCYTES NFR BLD: 7.7 % (ref 4–15)
NEUTROPHILS # BLD AUTO: 2.3 K/UL (ref 1.8–7.7)
NEUTROPHILS NFR BLD: 47.1 % (ref 38–73)
NONHDLC SERPL-MCNC: 142 MG/DL
NRBC BLD-RTO: 0 /100 WBC
PLATELET # BLD AUTO: 252 K/UL (ref 150–450)
PMV BLD AUTO: 12.3 FL (ref 9.2–12.9)
POTASSIUM SERPL-SCNC: 4.1 MMOL/L (ref 3.5–5.1)
PROT SERPL-MCNC: 6.5 G/DL (ref 6–8.4)
RBC # BLD AUTO: 3.86 M/UL (ref 4–5.4)
SATURATED IRON: 11 % (ref 20–50)
SODIUM SERPL-SCNC: 139 MMOL/L (ref 136–145)
TOTAL IRON BINDING CAPACITY: 401 UG/DL (ref 250–450)
TRANSFERRIN SERPL-MCNC: 271 MG/DL (ref 200–375)
TRIGL SERPL-MCNC: 69 MG/DL (ref 30–150)
TSH SERPL DL<=0.005 MIU/L-ACNC: 0.81 UIU/ML (ref 0.4–4)
WBC # BLD AUTO: 4.95 K/UL (ref 3.9–12.7)

## 2024-08-09 PROCEDURE — 83036 HEMOGLOBIN GLYCOSYLATED A1C: CPT | Performed by: NURSE PRACTITIONER

## 2024-08-09 PROCEDURE — 85025 COMPLETE CBC W/AUTO DIFF WBC: CPT | Performed by: NURSE PRACTITIONER

## 2024-08-09 PROCEDURE — 80061 LIPID PANEL: CPT | Performed by: NURSE PRACTITIONER

## 2024-08-09 PROCEDURE — 82728 ASSAY OF FERRITIN: CPT | Performed by: NURSE PRACTITIONER

## 2024-08-09 PROCEDURE — 36415 COLL VENOUS BLD VENIPUNCTURE: CPT | Performed by: NURSE PRACTITIONER

## 2024-08-09 PROCEDURE — 84443 ASSAY THYROID STIM HORMONE: CPT | Performed by: NURSE PRACTITIONER

## 2024-08-09 PROCEDURE — 83540 ASSAY OF IRON: CPT | Performed by: NURSE PRACTITIONER

## 2024-08-09 PROCEDURE — 80053 COMPREHEN METABOLIC PANEL: CPT | Performed by: NURSE PRACTITIONER

## 2024-08-12 ENCOUNTER — OFFICE VISIT (OUTPATIENT)
Dept: INTERNAL MEDICINE | Facility: CLINIC | Age: 39
End: 2024-08-12
Payer: COMMERCIAL

## 2024-08-12 VITALS
OXYGEN SATURATION: 97 % | DIASTOLIC BLOOD PRESSURE: 86 MMHG | HEIGHT: 65 IN | WEIGHT: 173.94 LBS | HEART RATE: 65 BPM | BODY MASS INDEX: 28.98 KG/M2 | SYSTOLIC BLOOD PRESSURE: 132 MMHG

## 2024-08-12 DIAGNOSIS — Z90.3 S/P GASTRIC SLEEVE PROCEDURE: ICD-10-CM

## 2024-08-12 DIAGNOSIS — D50.8 OTHER IRON DEFICIENCY ANEMIA: ICD-10-CM

## 2024-08-12 DIAGNOSIS — E78.2 MIXED HYPERLIPIDEMIA: ICD-10-CM

## 2024-08-12 DIAGNOSIS — E66.3 OVERWEIGHT (BMI 25.0-29.9): ICD-10-CM

## 2024-08-12 DIAGNOSIS — N92.0 MENORRHAGIA WITH REGULAR CYCLE: ICD-10-CM

## 2024-08-12 DIAGNOSIS — F41.9 ANXIETY: ICD-10-CM

## 2024-08-12 DIAGNOSIS — Z00.00 VISIT FOR ANNUAL HEALTH EXAMINATION: Primary | ICD-10-CM

## 2024-08-12 DIAGNOSIS — I10 ESSENTIAL HYPERTENSION: ICD-10-CM

## 2024-08-12 PROCEDURE — 99395 PREV VISIT EST AGE 18-39: CPT | Mod: S$GLB,,, | Performed by: INTERNAL MEDICINE

## 2024-08-12 PROCEDURE — 1160F RVW MEDS BY RX/DR IN RCRD: CPT | Mod: CPTII,S$GLB,, | Performed by: INTERNAL MEDICINE

## 2024-08-12 PROCEDURE — 3008F BODY MASS INDEX DOCD: CPT | Mod: CPTII,S$GLB,, | Performed by: INTERNAL MEDICINE

## 2024-08-12 PROCEDURE — 3044F HG A1C LEVEL LT 7.0%: CPT | Mod: CPTII,S$GLB,, | Performed by: INTERNAL MEDICINE

## 2024-08-12 PROCEDURE — 1159F MED LIST DOCD IN RCRD: CPT | Mod: CPTII,S$GLB,, | Performed by: INTERNAL MEDICINE

## 2024-08-12 PROCEDURE — 99999 PR PBB SHADOW E&M-EST. PATIENT-LVL V: CPT | Mod: PBBFAC,,, | Performed by: INTERNAL MEDICINE

## 2024-08-12 PROCEDURE — 3079F DIAST BP 80-89 MM HG: CPT | Mod: CPTII,S$GLB,, | Performed by: INTERNAL MEDICINE

## 2024-08-12 PROCEDURE — 3075F SYST BP GE 130 - 139MM HG: CPT | Mod: CPTII,S$GLB,, | Performed by: INTERNAL MEDICINE

## 2024-08-12 RX ORDER — LEVONORGESTREL AND ETHINYL ESTRADIOL 150-30(84)
1 KIT ORAL
COMMUNITY
End: 2024-08-12

## 2024-08-12 RX ORDER — FERROUS GLUCONATE 324(38)MG
324 TABLET ORAL
Qty: 90 TABLET | Refills: 3 | Status: SHIPPED | OUTPATIENT
Start: 2024-08-12

## 2024-08-12 RX ORDER — ALPRAZOLAM 0.25 MG/1
0.25 TABLET ORAL NIGHTLY PRN
Qty: 30 TABLET | Refills: 0 | Status: SHIPPED | OUTPATIENT
Start: 2024-08-12 | End: 2024-09-11

## 2024-08-12 NOTE — PROGRESS NOTES
INTERNAL MEDICINE ESTABLISHED PATIENT VISIT NOTE    Subjective:     Chief Complaint: Annual Exam       Patient ID: Katarina Munguia is a 39 y.o. female with  HTN, HLD, obesity s/p gastric sleeve 5/2022, fatty liver, GERD, hx latent TB s/p tx for 6 mos, hx malignant melanoma of mid back, R shoulder and then R ankle followed by Dr. Howard, last seen by me 8/2023, here today for annual exam.    Today c c/o increased anxiety which she attributes to planning for her wedding in Dec and partly due to work.    Still exercises regularly and does both cardio and weight lifting.    Has tried meditation and exercise to help c anxiety sx but states sometimes anxiety gets so bad that it causes palpitations and feels overwhelmed.  Requested medication to take prn, only when sx severe.  Prefers not to be on a daily med.    Still c monthly cycles but states her cycles have been lasting a long time, up to 11 days.  D/w her gyn Dr. Mas who had her on OCP (Daysee) which she states didn't help.  States she discussed this c Dr. Mas who told her to continue the OCP but states she was frustrated that the meds weren't helping so stopped taking it (of note, kellie had a vasectomy).      Past Medical History:  Past Medical History:   Diagnosis Date    Allergy     Dysplastic nevus 06/21/2021    r upper arm    Fatty liver disease, nonalcoholic     Hypertension     Melanoma 2006    back    Melanoma 11/2019    R ankle with Dr. Ferrell with [facility]    Melanoma 11/2020    R shoulder excised at [facility] Medications:  Prior to Admission medications    Medication Sig Start Date End Date Taking? Authorizing Provider   fluticasone propionate (FLONASE) 50 mcg/actuation nasal spray 1 spray (50 mcg total) by Each Nostril route once daily. 8/28/23  Yes Priti Mckoy MD   multivitamin capsule Take 1 capsule by mouth once daily.   Yes Provider, Historical   DAYSEE 0.15 mg-30 mcg (84)/10 mcg (7) 3MPk Take 1 tablet by mouth.  Patient not taking:  "Reported on 8/12/2024    Provider, Historical       Allergies:  Review of patient's allergies indicates:   Allergen Reactions    Codeine        Social History:  Social History     Tobacco Use    Smoking status: Never    Smokeless tobacco: Never   Substance Use Topics    Alcohol use: Yes     Alcohol/week: 1.0 standard drink of alcohol     Types: 1 Cans of beer per week     Comment: occasional    Drug use: No        Review of Systems   Constitutional:  Negative for activity change and unexpected weight change.   HENT:  Negative for hearing loss, rhinorrhea and trouble swallowing.    Eyes:  Negative for discharge and visual disturbance.   Respiratory:  Negative for chest tightness and wheezing.    Cardiovascular:  Negative for chest pain and palpitations.   Gastrointestinal:  Negative for blood in stool, constipation, diarrhea and vomiting.   Endocrine: Negative for polydipsia and polyuria.   Genitourinary:  Negative for difficulty urinating, dysuria, hematuria and menstrual problem.   Musculoskeletal:  Negative for arthralgias, joint swelling and neck pain.   Neurological:  Negative for weakness and headaches.   Psychiatric/Behavioral:  Negative for confusion and dysphoric mood. The patient is nervous/anxious.          Health Maintenance:     Immunizations:   Influenza 11/2023, rec repeat this Fall  TDap 4/2021  Prevnar rec 11/2020, pneumovax rec but not covered by insurance  Shingrix rec at 51  Moderna covid vaccines completed 3/2021, 4/2021, 8/2022, 2/2023, rec updated booster once available.     Cancer Screening:  PAP: 1/2023 CATINA seebelia Mas  Mammogram:  rec at 41  Colonoscopy:  rec at 46    Objective:   /86   Pulse 65   Ht 5' 5" (1.651 m)   Wt 78.9 kg (173 lb 15.1 oz)   LMP 07/19/2024 (Exact Date)   SpO2 97%   BMI 28.95 kg/m²        General: AAO x3, no apparent distress  HEENT: no cervical LAD, no thyroid masses appreciated  CV: RRR, no m/r/g  Pulm: Lungs CTAB, no crackles, no wheezes  Abd: " s/NT/ND +BS  Extremities: no c/c/e    Labs:     Lab Results   Component Value Date    WBC 4.95 08/09/2024    HGB 9.8 (L) 08/09/2024    HCT 32.3 (L) 08/09/2024    MCV 84 08/09/2024     08/09/2024     Sodium   Date Value Ref Range Status   08/09/2024 139 136 - 145 mmol/L Final     Potassium   Date Value Ref Range Status   08/09/2024 4.1 3.5 - 5.1 mmol/L Final     Chloride   Date Value Ref Range Status   08/09/2024 106 95 - 110 mmol/L Final     CO2   Date Value Ref Range Status   08/09/2024 20 (L) 23 - 29 mmol/L Final     Glucose   Date Value Ref Range Status   08/09/2024 76 70 - 110 mg/dL Final     BUN   Date Value Ref Range Status   08/09/2024 11 6 - 20 mg/dL Final     Creatinine   Date Value Ref Range Status   08/09/2024 0.7 0.5 - 1.4 mg/dL Final     Calcium   Date Value Ref Range Status   08/09/2024 9.1 8.7 - 10.5 mg/dL Final     Total Protein   Date Value Ref Range Status   08/09/2024 6.5 6.0 - 8.4 g/dL Final     Albumin   Date Value Ref Range Status   08/09/2024 3.9 3.5 - 5.2 g/dL Final     Total Bilirubin   Date Value Ref Range Status   08/09/2024 0.7 0.1 - 1.0 mg/dL Final     Comment:     For infants and newborns, interpretation of results should be based  on gestational age, weight and in agreement with clinical  observations.    Premature Infant recommended reference ranges:  Up to 24 hours.............<8.0 mg/dL  Up to 48 hours............<12.0 mg/dL  3-5 days..................<15.0 mg/dL  6-29 days.................<15.0 mg/dL       Alkaline Phosphatase   Date Value Ref Range Status   08/09/2024 31 (L) 55 - 135 U/L Final     AST   Date Value Ref Range Status   08/09/2024 15 10 - 40 U/L Final     ALT   Date Value Ref Range Status   08/09/2024 9 (L) 10 - 44 U/L Final     Anion Gap   Date Value Ref Range Status   08/09/2024 13 8 - 16 mmol/L Final     eGFR if    Date Value Ref Range Status   03/18/2022 >60 >60 mL/min/1.73 m^2 Final     eGFR if non    Date Value Ref Range  Status   03/18/2022 >60 >60 mL/min/1.73 m^2 Final     Comment:     Calculation used to obtain the estimated glomerular filtration  rate (eGFR) is the CKD-EPI equation.        Lab Results   Component Value Date    HGBA1C 5.0 08/09/2024     Lab Results   Component Value Date    LDLCALC 128.2 08/09/2024     Lab Results   Component Value Date    TSH 0.812 08/09/2024         Assessment/Plan     Katarina was seen today for annual exam.    Diagnoses and all orders for this visit:    Visit for annual health examination  History and physical exam completed.  Health maintenance reviewed as above.    Other iron deficiency anemia  As per HPI  Likely due to menses but also due to hx gastric sleeve  Advised to increase her fe supplement from 2 tablets once dialy to one tablet TID.  Will also refer to hematology to evaluate for IV iron.  -     ferrous gluconate (FERGON) 324 MG tablet; Take 1 tablet (324 mg total) by mouth 3 (three) times daily with meals.  -     Ambulatory referral/consult to Hematology / Oncology; Future    Menorrhagia with regular cycle  As per HPI  Pt requesting to [facility] c gyn here since Dr. Bahena likely retiring soon and pt wants second opinion regarding mgmt of menorrhagia sx.  -     Ambulatory referral/consult to Obstetrics / Gynecology; Future    Anxiety  As per HPI  Will give 30 tablets to last through Dec.  -     ALPRAZolam (XANAX) 0.25 MG tablet; Take 1 tablet (0.25 mg total) by mouth nightly as needed for Anxiety.    Essential hypertension  Diet controlled  Cont wt mgmt and regular exercise, low Na diet.    Mixed hyperlipidemia  Lab Results   Component Value Date    LDLCALC 128.2 08/09/2024     Diet controlled  No issues    Overweight (BMI 25.0-29.9)  S/P gastric sleeve procedure  Stable, no issues  Doing well c regular exercise.  Lifts weights so muscle mass likely contributing to BMI      HM as above  RTC in 1 year, sooner if needed.    Priti Mckoy MD  Department of Internal Medicine - [facility]  Himans[facility] Stout  08/12/2024

## 2024-08-15 ENCOUNTER — TELEPHONE (OUTPATIENT)
Dept: OBSTETRICS AND GYNECOLOGY | Facility: CLINIC | Age: 39
End: 2024-08-15
Payer: COMMERCIAL

## 2024-08-15 NOTE — TELEPHONE ENCOUNTER
Called Pt /No answer       Left msg to inform pt that the call was regarding scheduling her appt with Dr Lucie Wells . Pt is scheduled 11/19 for 10 am @ [facility] for follow up / annual no further questions or concerns.

## 2024-09-17 ENCOUNTER — OFFICE VISIT (OUTPATIENT)
Dept: HEMATOLOGY/ONCOLOGY | Facility: CLINIC | Age: 39
End: 2024-09-17
Payer: COMMERCIAL

## 2024-09-17 DIAGNOSIS — E61.1 IRON DEFICIENCY: ICD-10-CM

## 2024-09-17 DIAGNOSIS — Z90.3 S/P GASTRIC SLEEVE PROCEDURE: ICD-10-CM

## 2024-09-17 DIAGNOSIS — D50.8 OTHER IRON DEFICIENCY ANEMIA: Primary | ICD-10-CM

## 2024-09-17 DIAGNOSIS — Z85.820 HISTORY OF MELANOMA: ICD-10-CM

## 2024-09-17 PROCEDURE — 99204 OFFICE O/P NEW MOD 45 MIN: CPT | Mod: 95,,, | Performed by: INTERNAL MEDICINE

## 2024-09-17 PROCEDURE — 3044F HG A1C LEVEL LT 7.0%: CPT | Mod: CPTII,95,, | Performed by: INTERNAL MEDICINE

## 2024-09-17 RX ORDER — EPINEPHRINE 0.3 MG/.3ML
0.3 INJECTION SUBCUTANEOUS ONCE AS NEEDED
Status: CANCELLED | OUTPATIENT
Start: 2024-09-24

## 2024-09-17 RX ORDER — HEPARIN 100 UNIT/ML
500 SYRINGE INTRAVENOUS
Status: CANCELLED | OUTPATIENT
Start: 2024-09-24

## 2024-09-17 RX ORDER — DIPHENHYDRAMINE HYDROCHLORIDE 50 MG/ML
50 INJECTION INTRAMUSCULAR; INTRAVENOUS ONCE AS NEEDED
Status: CANCELLED | OUTPATIENT
Start: 2024-09-24

## 2024-09-17 RX ORDER — EPINEPHRINE 0.3 MG/.3ML
0.3 INJECTION SUBCUTANEOUS ONCE AS NEEDED
Status: CANCELLED | OUTPATIENT
Start: 2024-09-17

## 2024-09-17 RX ORDER — HEPARIN 100 UNIT/ML
500 SYRINGE INTRAVENOUS
Status: CANCELLED | OUTPATIENT
Start: 2024-09-17

## 2024-09-17 RX ORDER — DIPHENHYDRAMINE HYDROCHLORIDE 50 MG/ML
50 INJECTION INTRAMUSCULAR; INTRAVENOUS ONCE AS NEEDED
Status: CANCELLED | OUTPATIENT
Start: 2024-09-17

## 2024-09-17 RX ORDER — SODIUM CHLORIDE 0.9 % (FLUSH) 0.9 %
10 SYRINGE (ML) INJECTION
Status: CANCELLED | OUTPATIENT
Start: 2024-09-17

## 2024-09-17 RX ORDER — SODIUM CHLORIDE 0.9 % (FLUSH) 0.9 %
10 SYRINGE (ML) INJECTION
Status: CANCELLED | OUTPATIENT
Start: 2024-09-24

## 2024-09-17 NOTE — PROGRESS NOTES
Hematology and Medical Oncology   New Patient Consult     09/17/2024  Referred by:  Dr. Rowe    Reason For Referral:Iron Deficiency Anemia    Telemedicine Documentation:  The patient location is: home  The chief complaint leading to consultation is: MICHAEL    Visit type: audiovisual    Face to Face time with patient: 25  45 minutes of total time spent on the encounter, which includes face to face time and non-face to face time preparing to see the patient (eg, review of tests), Obtaining and/or reviewing separately obtained history, Documenting clinical information in the electronic or other health record, Independently interpreting results (not separately reported) and communicating results to the patient/family/caregiver, or Care coordination (not separately reported).         Each patient to whom he or she provides medical services by telemedicine is:  (1) informed of the relationship between the physician and patient and the respective role of any other health care provider with respect to management of the patient; and (2) notified that he or she may decline to receive medical services by telemedicine and may withdraw from such care at any time.      History of Present Ilness:   Katarina Munguia (Katarina) is a pleasant 39 y.o.female who presents virtually to discuss iron deficiency and possible interventions.    Had gastric sleeve surgery in the past.  Menstrual period ranges from 9-11 days.    No tell signs like chewing ice.    Works out 5-6 days a week. Gets bad headaches during period. Uses tampon and pad once an hour x first 3 days.     Works as an occupational therapist.    PAST MEDICAL HISTORY:   Past Medical History:   Diagnosis Date    Allergy     Dysplastic nevus 06/21/2021    r upper arm    Fatty liver disease, nonalcoholic     Hypertension     Melanoma 2006    back    Melanoma 11/2019    R ankle with Dr. Ferrell with [facility]    Melanoma 11/2020    R shoulder excised at [facility]       PAST SURGICAL  HISTORY:   Past Surgical History:   Procedure Laterality Date    AUGMENTATION OF BREAST      ESOPHAGOGASTRODUODENOSCOPY N/A 11/29/2019    Procedure: ESOPHAGOGASTRODUODENOSCOPY (EGD);  Surgeon: Katty Garcia MD;  Location: [facility];  Service: Endoscopy;  Laterality: N/A;    FOOT SURGERY      FOOT SURGERY Right 2006    removal of glass    INJECTION FOR SENTINEL NODE IDENTIFICATION Right 11/25/2019    Procedure: INJECTION, FOR SENTINEL NODE IDENTIFICATION-Right groin;  Surgeon: Cj Ferrell MD;  Location: [address];  Service: General;  Laterality: Right;    SENTINEL LYMPH NODE BIOPSY Right 11/25/2019    Procedure: BIOPSY, LYMPH NODE, SENTINEL-Right groin;  Surgeon: Cj Ferrell MD;  Location: [address];  Service: General;  Laterality: Right;    TONSILLECTOMY         PAST SOCIAL HISTORY:   reports that she has never smoked. She has never used smokeless tobacco. She reports current alcohol use of about 1.0 standard drink of alcohol per week. She reports that she does not use drugs.    FAMILY HISTORY:  Family History   Problem Relation Name Age of Onset    Hypertension Mother      Hyperlipidemia Mother      Transient ischemic attack Mother      Lymphoma Mother          non Hodgkins lymphoma, Dr. Martin    Cancer Father      Lung cancer Father          smoker    Heart disease Maternal Grandfather      Heart disease Sister 3         [place]       CURRENT MEDICATIONS:   Current Outpatient Medications   Medication Sig    ALPRAZolam (XANAX) 0.25 MG tablet Take 1 tablet (0.25 mg total) by mouth nightly as needed for Anxiety.    ferrous gluconate (FERGON) 324 MG tablet Take 1 tablet (324 mg total) by mouth 3 (three) times daily with meals.    fluticasone propionate (FLONASE) 50 mcg/actuation nasal spray 1 spray (50 mcg total) by Each Nostril route once daily.    multivitamin capsule Take 1 capsule by mouth once daily.     No current facility-administered medications for this visit.     ALLERGIES:    Review of patient's allergies indicates:  No Active Allergies      Review of Systems:     Review of Systems   Constitutional:  Negative for appetite change, chills, diaphoresis, fatigue, fever and unexpected weight change.   HENT:   Negative for hearing loss, mouth sores, nosebleeds, sore throat, trouble swallowing and voice change.    Eyes:  Negative for eye problems and icterus.   Respiratory:  Negative for chest tightness, cough, hemoptysis, shortness of breath and wheezing.    Cardiovascular:  Negative for chest pain, leg swelling and palpitations.   Gastrointestinal:  Negative for abdominal distention, abdominal pain, blood in stool, diarrhea, nausea and vomiting.   Endocrine: Negative for hot flashes.   Genitourinary:  Negative for bladder incontinence, difficulty urinating, dysuria, frequency and hematuria.    Musculoskeletal:  Negative for arthralgias, back pain, flank pain, gait problem, myalgias, neck pain and neck stiffness.   Skin:  Negative for itching, rash and wound.   Neurological:  Positive for headaches. Negative for dizziness, extremity weakness, gait problem, numbness, seizures and speech difficulty.   Hematological:  Negative for adenopathy. Does not bruise/bleed easily.   Psychiatric/Behavioral:  Negative for confusion, depression and sleep disturbance. The patient is not nervous/anxious.           Physical Exam:     Limited secondary to virtual visit    ECOG Performance Status: (foot note - ECOG PS provided by Eastern Cooperative Oncology Group) 0 - Asymptomatic    Karnofsky Performance Score:  100%- Normal, No Complaints, No Evidence of Disease    Labs:   Lab Results   Component Value Date    WBC 4.95 08/09/2024    HGB 9.8 (L) 08/09/2024    HCT 32.3 (L) 08/09/2024     08/09/2024    CHOL 211 (H) 08/09/2024    TRIG 69 08/09/2024    HDL 69 08/09/2024    ALT 9 (L) 08/09/2024    AST 15 08/09/2024     08/09/2024    K 4.1 08/09/2024     08/09/2024    CREATININE 0.7 08/09/2024     BUN 11 08/09/2024    CO2 20 (L) 08/09/2024    TSH 0.812 08/09/2024    INR 1.0 11/20/2020    HGBA1C 5.0 08/09/2024     Iron:43  TIBC: 401  Ferritin :5    Imaging: Previous imaging has been reviewed     Assessment and Plan:     Ms. Munguia is a pleasant 39 year old female with identified Iron Deficiency Anemia    Iron deficiency anemia  --Identified on labs  --Thought to be related to the gastric sleeve and extremely heavy cycles  --Given 3 gram drop in hemoglobin and profound iron deficiency identified I would recommend iv iron replacement  --Plan for repeat labs in roughly 4 months      I have provided the patient with an opportunity to ask questions and have all questions answered to her satisfaction.       she will return to clinic in 4 months, but knows to call in the interim if symptoms change or should a problem arise.        Gerda Orozco MD  Hematology and Medical Oncology  Bone Marrow Transplant  [facility]        BMT Chart Routing      Follow up with physician . 1. insurance auth and schedule iv iron x2 2. see me in 4 months with cbc,cmp, iron, ferritin   Follow up with [facility]    Provider visit type    Infusion scheduling note    Injection scheduling note    Labs    Imaging    Pharmacy appointment    Other referrals

## 2024-09-25 ENCOUNTER — PATIENT MESSAGE (OUTPATIENT)
Dept: HEMATOLOGY/ONCOLOGY | Facility: CLINIC | Age: 39
End: 2024-09-25
Payer: COMMERCIAL

## 2024-09-26 RX ORDER — DIPHENHYDRAMINE HYDROCHLORIDE 50 MG/ML
50 INJECTION INTRAMUSCULAR; INTRAVENOUS ONCE AS NEEDED
OUTPATIENT
Start: 2024-10-03

## 2024-09-26 RX ORDER — DIPHENHYDRAMINE HYDROCHLORIDE 50 MG/ML
50 INJECTION INTRAMUSCULAR; INTRAVENOUS ONCE AS NEEDED
OUTPATIENT
Start: 2024-11-07

## 2024-09-26 RX ORDER — DIPHENHYDRAMINE HYDROCHLORIDE 50 MG/ML
50 INJECTION INTRAMUSCULAR; INTRAVENOUS ONCE AS NEEDED
OUTPATIENT
Start: 2024-10-24

## 2024-09-26 RX ORDER — EPINEPHRINE 0.3 MG/.3ML
0.3 INJECTION SUBCUTANEOUS ONCE AS NEEDED
OUTPATIENT
Start: 2024-10-24

## 2024-09-26 RX ORDER — HEPARIN 100 UNIT/ML
500 SYRINGE INTRAVENOUS
OUTPATIENT
Start: 2024-09-26

## 2024-09-26 RX ORDER — EPINEPHRINE 0.3 MG/.3ML
0.3 INJECTION SUBCUTANEOUS ONCE AS NEEDED
OUTPATIENT
Start: 2024-10-03

## 2024-09-26 RX ORDER — HEPARIN 100 UNIT/ML
500 SYRINGE INTRAVENOUS
OUTPATIENT
Start: 2024-11-14

## 2024-09-26 RX ORDER — HEPARIN 100 UNIT/ML
500 SYRINGE INTRAVENOUS
OUTPATIENT
Start: 2024-10-17

## 2024-09-26 RX ORDER — DIPHENHYDRAMINE HYDROCHLORIDE 50 MG/ML
50 INJECTION INTRAMUSCULAR; INTRAVENOUS ONCE AS NEEDED
OUTPATIENT
Start: 2024-10-31

## 2024-09-26 RX ORDER — EPINEPHRINE 0.3 MG/.3ML
0.3 INJECTION SUBCUTANEOUS ONCE AS NEEDED
OUTPATIENT
Start: 2024-10-31

## 2024-09-26 RX ORDER — EPINEPHRINE 0.3 MG/.3ML
0.3 INJECTION SUBCUTANEOUS ONCE AS NEEDED
OUTPATIENT
Start: 2024-10-10

## 2024-09-26 RX ORDER — HEPARIN 100 UNIT/ML
500 SYRINGE INTRAVENOUS
OUTPATIENT
Start: 2024-10-24

## 2024-09-26 RX ORDER — HEPARIN 100 UNIT/ML
500 SYRINGE INTRAVENOUS
OUTPATIENT
Start: 2024-10-10

## 2024-09-26 RX ORDER — SODIUM CHLORIDE 0.9 % (FLUSH) 0.9 %
10 SYRINGE (ML) INJECTION
OUTPATIENT
Start: 2024-10-31

## 2024-09-26 RX ORDER — EPINEPHRINE 0.3 MG/.3ML
0.3 INJECTION SUBCUTANEOUS ONCE AS NEEDED
OUTPATIENT
Start: 2024-09-26

## 2024-09-26 RX ORDER — SODIUM CHLORIDE 0.9 % (FLUSH) 0.9 %
10 SYRINGE (ML) INJECTION
OUTPATIENT
Start: 2024-10-24

## 2024-09-26 RX ORDER — EPINEPHRINE 0.3 MG/.3ML
0.3 INJECTION SUBCUTANEOUS ONCE AS NEEDED
OUTPATIENT
Start: 2024-10-17

## 2024-09-26 RX ORDER — DIPHENHYDRAMINE HYDROCHLORIDE 50 MG/ML
50 INJECTION INTRAMUSCULAR; INTRAVENOUS ONCE AS NEEDED
OUTPATIENT
Start: 2024-10-10

## 2024-09-26 RX ORDER — HEPARIN 100 UNIT/ML
500 SYRINGE INTRAVENOUS
OUTPATIENT
Start: 2024-11-07

## 2024-09-26 RX ORDER — SODIUM CHLORIDE 0.9 % (FLUSH) 0.9 %
10 SYRINGE (ML) INJECTION
OUTPATIENT
Start: 2024-10-17

## 2024-09-26 RX ORDER — HEPARIN 100 UNIT/ML
500 SYRINGE INTRAVENOUS
OUTPATIENT
Start: 2024-10-31

## 2024-09-26 RX ORDER — SODIUM CHLORIDE 0.9 % (FLUSH) 0.9 %
10 SYRINGE (ML) INJECTION
OUTPATIENT
Start: 2024-10-03

## 2024-09-26 RX ORDER — DIPHENHYDRAMINE HYDROCHLORIDE 50 MG/ML
50 INJECTION INTRAMUSCULAR; INTRAVENOUS ONCE AS NEEDED
OUTPATIENT
Start: 2024-10-17

## 2024-09-26 RX ORDER — SODIUM CHLORIDE 0.9 % (FLUSH) 0.9 %
10 SYRINGE (ML) INJECTION
OUTPATIENT
Start: 2024-10-10

## 2024-09-26 RX ORDER — HEPARIN 100 UNIT/ML
500 SYRINGE INTRAVENOUS
OUTPATIENT
Start: 2024-10-03

## 2024-09-26 RX ORDER — SODIUM CHLORIDE 0.9 % (FLUSH) 0.9 %
10 SYRINGE (ML) INJECTION
OUTPATIENT
Start: 2024-11-14

## 2024-09-26 RX ORDER — SODIUM CHLORIDE 0.9 % (FLUSH) 0.9 %
10 SYRINGE (ML) INJECTION
OUTPATIENT
Start: 2024-11-07

## 2024-09-26 RX ORDER — EPINEPHRINE 0.3 MG/.3ML
0.3 INJECTION SUBCUTANEOUS ONCE AS NEEDED
OUTPATIENT
Start: 2024-11-07

## 2024-09-26 RX ORDER — SODIUM CHLORIDE 0.9 % (FLUSH) 0.9 %
10 SYRINGE (ML) INJECTION
OUTPATIENT
Start: 2024-09-26

## 2024-09-26 RX ORDER — DIPHENHYDRAMINE HYDROCHLORIDE 50 MG/ML
50 INJECTION INTRAMUSCULAR; INTRAVENOUS ONCE AS NEEDED
OUTPATIENT
Start: 2024-09-26

## 2024-09-26 RX ORDER — DIPHENHYDRAMINE HYDROCHLORIDE 50 MG/ML
50 INJECTION INTRAMUSCULAR; INTRAVENOUS ONCE AS NEEDED
OUTPATIENT
Start: 2024-11-14

## 2024-09-26 RX ORDER — EPINEPHRINE 0.3 MG/.3ML
0.3 INJECTION SUBCUTANEOUS ONCE AS NEEDED
OUTPATIENT
Start: 2024-11-14

## 2024-10-15 ENCOUNTER — PATIENT MESSAGE (OUTPATIENT)
Dept: HEMATOLOGY/ONCOLOGY | Facility: CLINIC | Age: 39
End: 2024-10-15
Payer: COMMERCIAL

## 2024-11-19 ENCOUNTER — OFFICE VISIT (OUTPATIENT)
Dept: OBSTETRICS AND GYNECOLOGY | Facility: CLINIC | Age: 39
End: 2024-11-19
Payer: COMMERCIAL

## 2024-11-19 VITALS
SYSTOLIC BLOOD PRESSURE: 130 MMHG | BODY MASS INDEX: 28.91 KG/M2 | WEIGHT: 173.75 LBS | DIASTOLIC BLOOD PRESSURE: 88 MMHG

## 2024-11-19 DIAGNOSIS — Z12.4 ENCOUNTER FOR SCREENING FOR CERVICAL CANCER: ICD-10-CM

## 2024-11-19 DIAGNOSIS — N92.0 MENORRHAGIA WITH REGULAR CYCLE: ICD-10-CM

## 2024-11-19 DIAGNOSIS — Z01.419 ENCOUNTER FOR ANNUAL ROUTINE GYNECOLOGICAL EXAMINATION: Primary | ICD-10-CM

## 2024-11-19 DIAGNOSIS — D50.0 IRON DEFICIENCY ANEMIA DUE TO CHRONIC BLOOD LOSS: ICD-10-CM

## 2024-11-19 DIAGNOSIS — N93.9 ABNORMAL UTERINE BLEEDING (AUB): Primary | ICD-10-CM

## 2024-11-19 DIAGNOSIS — Z11.51 ENCOUNTER FOR SCREENING FOR HUMAN PAPILLOMAVIRUS (HPV): ICD-10-CM

## 2024-11-19 PROCEDURE — 88175 CYTOPATH C/V AUTO FLUID REDO: CPT | Performed by: OBSTETRICS & GYNECOLOGY

## 2024-11-19 PROCEDURE — 87624 HPV HI-RISK TYP POOLED RSLT: CPT | Performed by: OBSTETRICS & GYNECOLOGY

## 2024-11-19 PROCEDURE — 99999 PR PBB SHADOW E&M-EST. PATIENT-LVL III: CPT | Mod: PBBFAC,,, | Performed by: OBSTETRICS & GYNECOLOGY

## 2024-11-23 LAB
FINAL PATHOLOGIC DIAGNOSIS: NORMAL
Lab: NORMAL

## 2024-11-26 LAB
HPV HR 12 DNA SPEC QL NAA+PROBE: NEGATIVE
HPV16 AG SPEC QL: NEGATIVE
HPV18 DNA SPEC QL NAA+PROBE: NEGATIVE

## 2024-12-04 ENCOUNTER — OFFICE VISIT (OUTPATIENT)
Dept: OBSTETRICS AND GYNECOLOGY | Facility: CLINIC | Age: 39
End: 2024-12-04
Payer: COMMERCIAL

## 2024-12-04 DIAGNOSIS — N92.0 MENORRHAGIA WITH REGULAR CYCLE: Primary | ICD-10-CM

## 2024-12-04 PROCEDURE — 99213 OFFICE O/P EST LOW 20 MIN: CPT | Mod: 95,,, | Performed by: OBSTETRICS & GYNECOLOGY

## 2024-12-04 PROCEDURE — 1159F MED LIST DOCD IN RCRD: CPT | Mod: CPTII,95,, | Performed by: OBSTETRICS & GYNECOLOGY

## 2024-12-04 PROCEDURE — 3044F HG A1C LEVEL LT 7.0%: CPT | Mod: CPTII,95,, | Performed by: OBSTETRICS & GYNECOLOGY

## 2024-12-04 PROCEDURE — 1160F RVW MEDS BY RX/DR IN RCRD: CPT | Mod: CPTII,95,, | Performed by: OBSTETRICS & GYNECOLOGY

## 2024-12-09 ENCOUNTER — PATIENT MESSAGE (OUTPATIENT)
Dept: OBSTETRICS AND GYNECOLOGY | Facility: CLINIC | Age: 39
End: 2024-12-09
Payer: COMMERCIAL

## 2024-12-09 DIAGNOSIS — N93.9 ABNORMAL UTERINE BLEEDING (AUB): Primary | ICD-10-CM

## 2024-12-09 DIAGNOSIS — J30.89 SEASONAL ALLERGIC RHINITIS DUE TO OTHER ALLERGIC TRIGGER: ICD-10-CM

## 2024-12-09 DIAGNOSIS — D50.8 OTHER IRON DEFICIENCY ANEMIA: ICD-10-CM

## 2024-12-09 RX ORDER — FLUTICASONE PROPIONATE 50 MCG
1 SPRAY, SUSPENSION (ML) NASAL DAILY
Qty: 48 ML | Refills: 6 | Status: SHIPPED | OUTPATIENT
Start: 2024-12-09

## 2024-12-09 RX ORDER — FERROUS GLUCONATE 324(38)MG
324 TABLET ORAL
Qty: 90 TABLET | Refills: 3 | Status: CANCELLED | OUTPATIENT
Start: 2024-12-09

## 2024-12-09 NOTE — TELEPHONE ENCOUNTER
No care due was identified.  [facility] Embedded Care Due Messages. Reference number: [id number].   12/09/2024 3:17:40 PM CST

## 2024-12-10 RX ORDER — NORETHINDRONE 0.35 MG/1
1 TABLET ORAL DAILY
Qty: 30 TABLET | Refills: 11 | Status: SHIPPED | OUTPATIENT
Start: 2024-12-10 | End: 2025-12-10

## 2024-12-26 ENCOUNTER — E-VISIT (OUTPATIENT)
Dept: URGENT CARE | Facility: CLINIC | Age: 39
End: 2024-12-26
Payer: COMMERCIAL

## 2024-12-26 DIAGNOSIS — M54.9 BACK PAIN, UNSPECIFIED BACK LOCATION, UNSPECIFIED BACK PAIN LATERALITY, UNSPECIFIED CHRONICITY: Primary | ICD-10-CM

## 2024-12-26 RX ORDER — LIDOCAINE 50 MG/G
1 PATCH TOPICAL DAILY
Qty: 30 PATCH | Refills: 0 | Status: SHIPPED | OUTPATIENT
Start: 2024-12-26

## 2024-12-26 RX ORDER — METHYLPREDNISOLONE 4 MG/1
TABLET ORAL
Qty: 21 TABLET | Refills: 0 | Status: SHIPPED | OUTPATIENT
Start: 2024-12-26

## 2024-12-26 RX ORDER — TIZANIDINE 2 MG/1
2 TABLET ORAL EVERY 8 HOURS PRN
Qty: 60 TABLET | Refills: 1 | Status: SHIPPED | OUTPATIENT
Start: 2024-12-26 | End: 2025-01-25

## 2024-12-26 NOTE — PROGRESS NOTES
Patient ID: Katarina Munguia is a 39 y.o. female.    Chief Complaint: General Illness (Entered automatically based on patient selection in [employer].)          274}  The patient initiated a request through [employer] on 12/26/2024 for evaluation and management with a chief complaint of General Illness (Entered automatically based on patient selection in [employer].)     I evaluated the questionnaire submission on 12/26/2024 .    Total Time (in minutes): 14     Ohs Peq Evisit Supergroup-Muscle,Back,Joint    12/26/2024 10:33 AM CST - Filed by Patient   What do you need help with? Back Problem   Do you agree to participate in an E-Visit? Yes   If you have any of the following symptoms, please present to your local emergency room or call 911: I acknowledge   Do you have any of the following pregnancy-related conditions? None   What is the main issue you would like addressed today? Lower back pain   Where are you having pain? Lower Back   Does the pain extend into your legs? No   How bad is the pain? The pain is moderate   Did you have an injury that caused the pain? No, I cannot remember an injury   How long has the pain been present? More than 1 week but less then 4 weeks   Have you had back pain in the past? I have never had serious back pain before   Do you have a fever? No, I do not have a fever   Do you have any of the following? None of the above   What makes the pain worse? Bending over;  Sitting down;  Strenuous activity   What makes the pain better? None of the above   Have you ever been diagnosed with cancer? Yes   Have you ever been diagnosed with degenerative disc disease (arthritis of the spine)? No   Have you ever been diagnosed with osteoporosis or any other bone weakness? No   Have you ever had surgery on your back or spine? No   What is your usual health status? I am active and can move normally   Provide any additional information you feel is important. Lower back pain started week of December 9th when  bending, sitting or trying to get up.   Please attach any relevant images or files    Are you able to take your vital signs? No          Active Problem List with Overview Notes    Diagnosis Date Noted    Iron deficiency 02/27/2023    S/P gastric sleeve procedure 02/27/2023    Abnormal granulation tissue 01/17/2020    Gastroesophageal reflux disease without esophagitis 09/20/2019    Fatty liver 09/20/2019    Positive QuantiFERON-TB Gold test 08/13/2018    Essential hypertension 06/03/2018    Hyperlipidemia 06/03/2018    Nasal polyp 06/03/2018    History of melanoma 06/03/2018    Overweight (BMI 25.0-29.9)       Recent Labs Obtained:  Lab Results   Component Value Date    WBC 4.95 08/09/2024    HGB 9.8 (L) 08/09/2024    HCT 32.3 (L) 08/09/2024    MCV 84 08/09/2024     08/09/2024     08/09/2024    K 4.1 08/09/2024    GLU 76 08/09/2024    CREATININE 0.7 08/09/2024    EGFRNORACEVR >60.0 08/09/2024    HGBA1C 5.0 08/09/2024    TSH 0.812 08/09/2024      Review of patient's allergies indicates:  No Known Allergies    Encounter Diagnosis   Name Primary?    Back pain, unspecified back location, unspecified back pain laterality, unspecified chronicity Yes        No orders of the defined types were placed in this encounter.     Medications Ordered This Encounter   Medications    LIDOcaine (LIDODERM) 5 %     Sig: Place 1 patch onto the skin once daily. Remove & Discard patch within 12 hours or as directed by MD     Dispense:  30 patch     Refill:  0    methylPREDNISolone (MEDROL DOSEPACK) 4 mg tablet     Sig: follow package directions     Dispense:  21 tablet     Refill:  0    tiZANidine (ZANAFLEX) 2 MG tablet     Sig: Take 1 tablet (2 mg total) by mouth every 8 (eight) hours as needed (muscle spasms).     Dispense:  60 tablet     Refill:  1        E-Visit Time Tracking:    Day 1 Time (in minutes): 14    Total Time (in minutes): 14      274}

## 2024-12-29 NOTE — PROGRESS NOTES
Chief Complaint: Well Woman Exam/Heavy menstrual cycles     HPI:     Katarina Munguia is a 39 y.o.  who presents for annual exam. She is currently complaining of  heavy menstrual cycles and referred by PCP- Dr. Mckoy .    She saw Dr. Mas in the past who tried IUD which initially worked but had persistent nausea, Lysteda did not work at all and OCP trials last recommended but declined by patient due to prior side effects of nausea and mood changes when used in the past. She is open to surgical options. Cycles currently are regular lasting 9-199 days and progressively become heavier and longer over past 4 years. Using pad and super tampon for 2-3 days of each cycle having to often change due to heavy bleeding every 1-2 hours. Weakness and dizziness at times. No prior blood transfusion. Been on iron supplements in the past.    Ms. Munguia is currently sexually active with a single male partner. She declines STD screening today. Patient has regular monthly menses. Patient's last menstrual period was 10/29/2024 (exact date). She is currently using partner vasectomy for contraception. Menopausal complaints: none    Previous Pap: no abnormalities/HPV negative  Previous Mammogram: not indicated yet    Gardasil:Has never had     OB History          1    Para   1    Term   1            AB        Living   1         SAB        IAB        Ectopic        Multiple        Live Births   1                 GYN History  Age of Menarche:11  Comments: No abnormal pap or STDs     ROS:     GENERAL: Denies unintentional weight gain or weight loss. Feeling well overall.   SKIN: Denies rash or lesions.   HEENT: Denies headaches, or vision changes.   CARDIOVASCULAR: Denies palpitations or chest pain.   RESPIRATORY: Denies shortness of breath or dyspnea on exertion.  BREASTS: Denies pain, lumps, or nipple discharge.   ABDOMEN: Denies abdominal pain, constipation, diarrhea, nausea, vomiting, or change in appetite.    URINARY: Denies frequency, dysuria, hematuria.  NEUROLOGIC: Denies syncope or weakness.   PSYCHIATRIC: Denies depression, anxiety or mood swings.    Physical Exam:      PHYSICAL EXAM:  /88   Wt 78.8 kg (173 lb 11.6 oz)   LMP 10/29/2024 (Exact Date)   BMI 28.91 kg/m²   Body mass index is 28.91 kg/m².     APPEARANCE: Well nourished, well developed, in no acute distress.  PSYCH: Appropriate mood and affect.  SKIN: No acne or hirsutism  NECK: Neck symmetric without masses or thyromegaly  NODES: No inguinal, axillary, or supraclavicular lymph node enlargement  CHEST: Normal respiratory effort.  ABDOMEN: Soft.  No tenderness or masses.   BREASTS: Symmetrical, no skin changes or visible lesions.  No palpable masses or nipple discharge bilaterally.  PELVIC: Normal external genitalia without lesions.  Normal hair distribution.  Adequate perineal body, normal urethral meatus.  Vagina moist and well rugated without lesions or discharge.  Cervix pink, without lesions, discharge or tenderness.  No significant cystocele or rectocele.  Bimanual exam shows uterus to be normal size, regular, mobile and nontender.  Adnexa without masses or tenderness.    EXTREMITIES: No edema.    Lab Results   Component Value Date    WBC 4.95 08/09/2024    HGB 9.8 (L) 08/09/2024    HCT 32.3 (L) 08/09/2024    MCV 84 08/09/2024     08/09/2024       Lab Results   Component Value Date    TSH 0.812 08/09/2024     Lab Results   Component Value Date    FERRITIN 5 (L) 08/09/2024       Assessment/Plan:     Encounter for annual routine gynecological examination    Encounter for screening for cervical cancer  -     Liquid-Based Pap Smear, Screening    Encounter for screening for human papillomavirus (HPV)  -     HPV High Risk Genotypes, PCR    Menorrhagia with regular cycle  -     Ambulatory referral/consult to Obstetrics / Gynecology  -     US Pelvis Comp with Transvag NON-OB (xpd; Future; Expected date: 11/19/2024  - Discussed medical and  surgical options for management and will discuss further once results back on ultrasound to determine best options.      RTC prn ultrasound    Counseling:     Patient was counseled today on current ASCCP pap guidelines, the recommendation for yearly pelvic exams, healthy diet and exercise routines, breast self awareness and annual mammograms. She is to see her PCP for other health maintenance.       Use of the [facility] Patient Portal discussed and encouraged during today's visit.       Shilpa Wells MD    As of April 1, 2021, the Cures Act has been passed nationally. This new law requires that all doctors progress notes, lab results, pathology reports and radiology reports be released IMMEDIATELY to the patient in the patient portal. That means that the results are released to you at the EXACT same time they are released to me. Therefore, with all of the patients that I have I am not able to reply to each patient exactly when the results come in. So there will be a delay from when you see the results to when I see them and have time to come up with a response to send you. Also I only see these results when I am on the computer at work. So if the results come in over the weekend or after 5 pm of a work day, I will not see them until the next business day. As you can tell, this is a challenge as a physician to give every patient the quick response they hope for and deserve. So please be patient! Thanks for understanding, Dr. Wells

## 2025-01-01 NOTE — PROGRESS NOTES
Chief Complaint: U/S Results   The patient location is: Home  The chief complaint leading to consultation is: U/S results    Visit type: audiovisual    Face to Face time with patient: 15 minutes  20 minutes of total time spent on the encounter, which includes face to face time and non-face to face time preparing to see the patient (eg, review of tests), Obtaining and/or reviewing separately obtained history, Documenting clinical information in the electronic or other health record, Independently interpreting results (not separately reported) and communicating results to the patient/family/caregiver, or Care coordination (not separately reported).         Each patient to whom he or she provides medical services by telemedicine is:  (1) informed of the relationship between the physician and patient and the respective role of any other health care provider with respect to management of the patient; and (2) notified that he or she may decline to receive medical services by telemedicine and may withdraw from such care at any time.    Notes:    HPI:      Katarina Munguia is a 39 y.o.  who presents today for follow up of U/S results.  LMP: Patient's last menstrual period was 10/29/2024 (exact date).  No other issues, problems, or complaints.     Patient last seen 24 and U/S ordered for AUB work up.    OB History          1    Para   1    Term   1            AB        Living   1         SAB        IAB        Ectopic        Multiple        Live Births   1               Past Medical History:   Diagnosis Date    Allergy     Dysplastic nevus 2021    r upper arm    Fatty liver disease, nonalcoholic     Hypertension     Melanoma 2006    back    Melanoma 2019    R ankle with Dr. Ferrell with [facility]    Melanoma 2020    R shoulder excised at [facility]     Past Surgical History:   Procedure Laterality Date    AUGMENTATION OF BREAST      ESOPHAGOGASTRODUODENOSCOPY N/A 2019    Procedure:  ESOPHAGOGASTRODUODENOSCOPY (EGD);  Surgeon: Katty Garcia MD;  Location: [facility];  Service: Endoscopy;  Laterality: N/A;    FOOT SURGERY      FOOT SURGERY Right 2006    removal of glass    Gastric sleeve  2022    INJECTION FOR SENTINEL NODE IDENTIFICATION Right 11/25/2019    Procedure: INJECTION, FOR SENTINEL NODE IDENTIFICATION-Right groin;  Surgeon: Cj Ferrell MD;  Location: [address];  Service: General;  Laterality: Right;    SENTINEL LYMPH NODE BIOPSY Right 11/25/2019    Procedure: BIOPSY, LYMPH NODE, SENTINEL-Right groin;  Surgeon: Cj Ferrell MD;  Location: [facility] OR [address] [place] [address];  Service: General;  Laterality: Right;    TONSILLECTOMY       Social History     Socioeconomic History    Marital status: Single   Tobacco Use    Smoking status: Never    Smokeless tobacco: Never   Substance and Sexual Activity    Alcohol use: Yes     Alcohol/week: 1.0 standard drink of alcohol     Types: 1 Cans of beer per week     Comment: occasional    Drug use: No    Sexual activity: Yes     Partners: Male     Birth control/protection: Partner-Vasectomy   Social History Narrative    19 yo son        Works on [employer] for [employer] ([employer] industry)     Social Drivers of Health     Financial Resource Strain: Low Risk  (8/8/2024)    Overall Financial Resource Strain (CARDIA)     Difficulty of Paying Living Expenses: Not hard at all   Food Insecurity: No Food Insecurity (8/8/2024)    Hunger Vital Sign     Worried About Running Out of Food in the Last Year: Never true     Ran Out of Food in the Last Year: Never true   Transportation Needs: No Transportation Needs (2/22/2023)    PRAPARE - Transportation     Lack of Transportation (Medical): No     Lack of Transportation (Non-Medical): No   Physical Activity: Sufficiently Active (8/8/2024)    Exercise Vital Sign     Days of Exercise per Week: 5 days     Minutes of Exercise per Session: 70 min   Stress: No Stress Concern Present (8/8/2024)    [employer] [place] [employer]  - Occupational Stress Questionnaire     Feeling of Stress : Only a little   Housing Stability: Low Risk  (2/22/2023)    Housing Stability Vital Sign     Unable to Pay for Housing in the Last Year: No     Number of Places Lived in the Last Year: 1     Unstable Housing in the Last Year: No     Family History   Problem Relation Name Age of Onset    Hypertension Mother      Hyperlipidemia Mother      Transient ischemic attack Mother      Lymphoma Mother          non Hodgkins lymphoma, Dr. Martin    Cancer Father      Lung cancer Father          smoker    Heart disease Maternal Grandfather      Heart disease Sister 3         [place]       Current Outpatient Medications:     ALPRAZolam (XANAX) 0.25 MG tablet, Take 1 tablet (0.25 mg total) by mouth nightly as needed for Anxiety., Disp: 30 tablet, Rfl: 0    ferrous gluconate (FERGON) 324 MG tablet, Take 1 tablet (324 mg total) by mouth 3 (three) times daily with meals., Disp: 90 tablet, Rfl: 3    fluticasone propionate (FLONASE) 50 mcg/actuation nasal spray, 1 spray (50 mcg total) by Each Nostril route once daily., Disp: 48 mL, Rfl: 6    LIDOcaine (LIDODERM) 5 %, Place 1 patch onto the skin once daily. Remove & Discard patch within 12 hours or as directed by MD, Disp: 30 patch, Rfl: 0    methylPREDNISolone (MEDROL DOSEPACK) 4 mg tablet, follow package directions, Disp: 21 tablet, Rfl: 0    multivitamin capsule, Take 1 capsule by mouth once daily., Disp: , Rfl:     norethindrone (MICRONOR) 0.35 mg tablet, Take 1 tablet (0.35 mg total) by mouth once daily., Disp: 30 tablet, Rfl: 11    tiZANidine (ZANAFLEX) 2 MG tablet, Take 1 tablet (2 mg total) by mouth every 8 (eight) hours as needed (muscle spasms)., Disp: 60 tablet, Rfl: 1    ROS:     GENERAL: Denies unintentional weight gain or weight loss. Feeling well overall.   SKIN: Denies rash or lesions.   HEENT: Denies headaches, or vision changes.   ABDOMEN: Denies abdominal pain, constipation, diarrhea, nausea, vomiting, change in  appetite.  URINARY: Denies frequency, dysuria, hematuria.  NEUROLOGIC: Denies syncope or weakness.   PSYCHIATRIC: Denies depression, anxiety or mood swings.    Physical Exam:      PHYSICAL EXAM:  LMP 10/29/2024 (Exact Date)   There is no height or weight on file to calculate BMI.     APPEARANCE: Well nourished, well developed, in no acute distress.  PSYCH: Appropriate mood and affect.  SKIN: No acne or hirsutism.    Pap smear 24 negative/HPV negative    US Pelvis Comp with Transvag NON-OB (xpd  Narrative: EXAMINATION:  US PELVIS COMP WITH TRANSVAG NON-OB (XPD)    CLINICAL HISTORY:  Abnormal uterine and vaginal bleeding, unspecified    TECHNIQUE:  Transabdominal sonography of the pelvis was performed, followed by transvaginal sonography to better evaluate the uterus and ovaries.    COMPARISON:  None.    FINDINGS:  Uterus:    Size: 9.3 x 4.9 x 6.4 cm    Endometrium is normal caliber measuring 0.4 cm.    Parenchyma is mildly heterogeneous.  Two small intramural leiomyomas measure up to 1.3 cm.    Right ovary:    Size: 2.9 x 1.4 x 1.8 cm    Left ovary:    Size: 3.2 x 2 x 3 cm.  Dominant follicle, 2.2 cm.    Free Fluid:    None.  Impression: No acute process seen.    Mildly heterogeneous uterine parenchyma with 2 small intramural leiomyomas measuring up to 1.3 cm.    Electronically signed by: Pearl Haji  Date:    2024  Time:    11:41        Assessment/Plan:     39 y.o.     Menorrhagia with regular cycle    Reviewed ultrasound and finding of small fibroids. Discussed again medical and surgical options and desires hysterectomy though wants to wait until after new year as has insurance changes coming soon. Information given.      I spent a total of 20 minutes on the day of the visit. This includes fact to face time and non-face to face time preparing to see the patient (eg, review of tests), obtaining and/or reviewing separately obtained history, documenting clinical information in the electronic or  other health record, independently interpreting results and communicating results to the patient/family/caregiver, or care coordination.       Counselin.  U/S results reviewed with patient.  2.  Follow up with PCP for other health maintenance.  3.  RTC for annual exam or sooner if needed.    Use of the [facility] [facility] Patient Portal discussed and encouraged during today's visit.           Shilpa Wells MD

## 2025-01-03 ENCOUNTER — PATIENT MESSAGE (OUTPATIENT)
Dept: INTERNAL MEDICINE | Facility: CLINIC | Age: 40
End: 2025-01-03
Payer: COMMERCIAL

## 2025-01-04 ENCOUNTER — PATIENT MESSAGE (OUTPATIENT)
Dept: ADMINISTRATIVE | Facility: OTHER | Age: 40
End: 2025-01-04
Payer: COMMERCIAL

## 2025-01-08 ENCOUNTER — PATIENT MESSAGE (OUTPATIENT)
Dept: OBSTETRICS AND GYNECOLOGY | Facility: CLINIC | Age: 40
End: 2025-01-08
Payer: COMMERCIAL

## 2025-01-08 ENCOUNTER — PATIENT MESSAGE (OUTPATIENT)
Dept: HEMATOLOGY/ONCOLOGY | Facility: CLINIC | Age: 40
End: 2025-01-08
Payer: COMMERCIAL

## 2025-01-08 DIAGNOSIS — N93.9 ABNORMAL UTERINE BLEEDING (AUB): Primary | ICD-10-CM

## 2025-01-08 DIAGNOSIS — D50.0 IRON DEFICIENCY ANEMIA DUE TO CHRONIC BLOOD LOSS: ICD-10-CM

## 2025-01-08 DIAGNOSIS — Z01.818 PREOP EXAMINATION: ICD-10-CM

## 2025-01-08 DIAGNOSIS — D25.9 UTERINE LEIOMYOMA, UNSPECIFIED LOCATION: ICD-10-CM

## 2025-01-13 NOTE — TELEPHONE ENCOUNTER
Requested Date:  4/23/25    Requested Time:  8am    Case Length:150 minutes    Surgeon: Shilpa Wells      Assistant Surgeon: Linda Turcios   Visit Type: Outpatient    LOCATION: [facility]    PROCEDURE:Robotic Total Laparoscopic Hysterectomy, Bilateral salpingectomy, Cystoscopy  Diagnosis: Abnormal uterine bleeding, Uterine fibroids, Iron-deficiency anemia  Anesthesia type: General   Comments/Special Equipment Needed:  Rep needed: No  Does the patient need a PreOp appointment with MD: Yes  U/S needed at pre-op: No  PCP clearance: planned- note sent to PCP to schedule  When does she need her Post op appointment: 2 weeks in person and 6 weeks  Do you need additional time blocked out from clinic? No  If so, what time do you want to be back in clinic?   When can the patient go back to work? three weeks      Also need her scheduled for an EMB in January or February.

## 2025-01-31 ENCOUNTER — PATIENT MESSAGE (OUTPATIENT)
Dept: INTERNAL MEDICINE | Facility: CLINIC | Age: 40
End: 2025-01-31
Payer: COMMERCIAL

## 2025-01-31 ENCOUNTER — INFUSION (OUTPATIENT)
Dept: INFUSION THERAPY | Facility: HOSPITAL | Age: 40
End: 2025-01-31
Payer: COMMERCIAL

## 2025-01-31 VITALS
OXYGEN SATURATION: 100 % | TEMPERATURE: 98 F | BODY MASS INDEX: 28.91 KG/M2 | DIASTOLIC BLOOD PRESSURE: 59 MMHG | HEART RATE: 83 BPM | RESPIRATION RATE: 16 BRPM | WEIGHT: 173.75 LBS | SYSTOLIC BLOOD PRESSURE: 139 MMHG

## 2025-01-31 DIAGNOSIS — M54.50 CHRONIC BILATERAL LOW BACK PAIN WITHOUT SCIATICA: Primary | ICD-10-CM

## 2025-01-31 DIAGNOSIS — G89.29 CHRONIC BILATERAL LOW BACK PAIN WITHOUT SCIATICA: Primary | ICD-10-CM

## 2025-01-31 DIAGNOSIS — E61.1 IRON DEFICIENCY: Primary | ICD-10-CM

## 2025-01-31 PROCEDURE — 25000003 PHARM REV CODE 250: Performed by: INTERNAL MEDICINE

## 2025-01-31 PROCEDURE — 63600175 PHARM REV CODE 636 W HCPCS: Performed by: INTERNAL MEDICINE

## 2025-01-31 PROCEDURE — 96374 THER/PROPH/DIAG INJ IV PUSH: CPT

## 2025-01-31 RX ORDER — EPINEPHRINE 0.3 MG/.3ML
0.3 INJECTION SUBCUTANEOUS ONCE AS NEEDED
Status: DISCONTINUED | OUTPATIENT
Start: 2025-01-31 | End: 2025-01-31 | Stop reason: HOSPADM

## 2025-01-31 RX ORDER — SODIUM CHLORIDE 0.9 % (FLUSH) 0.9 %
10 SYRINGE (ML) INJECTION
Status: DISCONTINUED | OUTPATIENT
Start: 2025-01-31 | End: 2025-01-31 | Stop reason: HOSPADM

## 2025-01-31 RX ORDER — DIPHENHYDRAMINE HYDROCHLORIDE 50 MG/ML
50 INJECTION INTRAMUSCULAR; INTRAVENOUS ONCE AS NEEDED
Status: DISCONTINUED | OUTPATIENT
Start: 2025-01-31 | End: 2025-01-31 | Stop reason: HOSPADM

## 2025-01-31 RX ORDER — HEPARIN 100 UNIT/ML
500 SYRINGE INTRAVENOUS
Status: DISCONTINUED | OUTPATIENT
Start: 2025-01-31 | End: 2025-01-31 | Stop reason: HOSPADM

## 2025-01-31 RX ADMIN — SODIUM CHLORIDE: 9 INJECTION, SOLUTION INTRAVENOUS at 03:01

## 2025-01-31 RX ADMIN — IRON SUCROSE 200 MG: 20 INJECTION, SOLUTION INTRAVENOUS at 03:01

## 2025-01-31 NOTE — PLAN OF CARE
1610 Patient tolerated venofer IVP. She was observed for 30 minutes and no s/s of reaction seen. Educational handout given with the AVS and we discussed s/s that require her to go to the ER. PIV removed and catheter tip intact.

## 2025-02-07 ENCOUNTER — INFUSION (OUTPATIENT)
Dept: INFUSION THERAPY | Facility: HOSPITAL | Age: 40
End: 2025-02-07
Payer: COMMERCIAL

## 2025-02-07 VITALS
TEMPERATURE: 98 F | HEIGHT: 65 IN | RESPIRATION RATE: 18 BRPM | WEIGHT: 173.75 LBS | DIASTOLIC BLOOD PRESSURE: 86 MMHG | SYSTOLIC BLOOD PRESSURE: 155 MMHG | HEART RATE: 66 BPM | BODY MASS INDEX: 28.95 KG/M2

## 2025-02-07 DIAGNOSIS — E61.1 IRON DEFICIENCY: Primary | ICD-10-CM

## 2025-02-07 PROCEDURE — 96375 TX/PRO/DX INJ NEW DRUG ADDON: CPT

## 2025-02-07 PROCEDURE — 63600175 PHARM REV CODE 636 W HCPCS: Performed by: INTERNAL MEDICINE

## 2025-02-07 RX ORDER — HEPARIN 100 UNIT/ML
500 SYRINGE INTRAVENOUS
Status: DISCONTINUED | OUTPATIENT
Start: 2025-02-07 | End: 2025-02-07 | Stop reason: HOSPADM

## 2025-02-07 RX ORDER — SODIUM CHLORIDE 0.9 % (FLUSH) 0.9 %
10 SYRINGE (ML) INJECTION
Status: DISCONTINUED | OUTPATIENT
Start: 2025-02-07 | End: 2025-02-07 | Stop reason: HOSPADM

## 2025-02-07 RX ORDER — DIPHENHYDRAMINE HYDROCHLORIDE 50 MG/ML
50 INJECTION INTRAMUSCULAR; INTRAVENOUS ONCE AS NEEDED
Status: DISCONTINUED | OUTPATIENT
Start: 2025-02-07 | End: 2025-02-07 | Stop reason: HOSPADM

## 2025-02-07 RX ORDER — EPINEPHRINE 0.3 MG/.3ML
0.3 INJECTION SUBCUTANEOUS ONCE AS NEEDED
Status: DISCONTINUED | OUTPATIENT
Start: 2025-02-07 | End: 2025-02-07 | Stop reason: HOSPADM

## 2025-02-07 RX ADMIN — IRON SUCROSE 200 MG: 20 INJECTION, SOLUTION INTRAVENOUS at 10:02

## 2025-02-11 NOTE — PROGRESS NOTES
[facility]  Physical Therapy Lumbar Evaluation      Name: Katarina Munguia  Clinic Number: [id number]    Therapy Diagnosis:   Encounter Diagnoses   Name Primary?    Chronic bilateral low back pain without sciatica     Decreased range of motion of lumbar spine Yes    Decreased strength of trunk and back      Physician: Priti Mckoy MD    Physician Orders: PT Eval and Treat  Medical Diagnosis from Referral: M54.50,G89.29 (ICD-10-CM) - Chronic bilateral low back pain without sciatica   Evaluation Date: 2/12/2025  Authorization Period Expiration: 12/31/2025  Plan of Care Expiration: 4/25/2025  Reassessment Due: 3/12/2025  Visit # / Visits authorized: 1/1  MedX testing visit 2    Time In: 8:05 AM  Time Out: 9:00 AM  Total Billable Time: 55 minutes  INSURANCE and OUTCOMES: Program Benefit Group with Lumbar Outcomes (Oswestry and AQoL) 1/3    Precautions: standard and HTN    Pattern of pain determined: 1 PEN    Subjective   Date of onset: 6 months ago    History of current condition: Katarina reports low back pain on and off for about the last 6 months. She states back pain began to worsen in December without incident and is now limiting her function. She describes back pain as achy. She denies LE radicular symptoms. She states she is able to walk and run without pain. Aggravating factors include prolonged sitting and bending. She reports difficulty bending when putting clothes into the dryer and when doing dishes. Prior to onset of pain she was working out 5 times a week. She is still going to the gym but only doing cardio exercise.      Medical History:   Past Medical History:   Diagnosis Date    Allergy     Dysplastic nevus 06/21/2021    r upper arm    Fatty liver disease, nonalcoholic     Hypertension     Melanoma 2006    back    Melanoma 11/2019    R ankle with Dr. Ferrell with [facility]    Melanoma 11/2020    R shoulder excised at [facility]     Surgical History:   Katarina CHRISTIE Munguia  has a  past surgical history that includes Tonsillectomy; Foot surgery; [place] lymph node biopsy (Right, 11/25/2019); Injection for sentinel node identification (Right, 11/25/2019); Esophagogastroduodenoscopy (N/A, 11/29/2019); Augmentation of breast; Foot surgery (Right, 2006); and Gastric sleeve (2022).    Medications:   Katarina has a current medication list which includes the following prescription(s): alprazolam, ferrous gluconate, fluticasone propionate, lidocaine, methylprednisolone, multivitamin, and norethindrone.    Allergies:   Review of patient's allergies indicates:  No Known Allergies     Imaging: see imaging section     Prior Therapy: yes for ankle, none for back  Prior Treatment: chiropractor   Social History: lives with spouse   Occupation: works from home; [employer] job    Leisure: working out, walking, going out with friends   Prior Level of Function:   Current Level of Function: difficulty with bending activity, prolonged sitting   DME owned/used: none  Gym Membership: [employer]    Pain:  Current 4/10, worst 8/10, best 0/10   Location: low back bilateral   Description: Aching  Aggravating Factors: bending, prolonged sitting   Easing Factors: lidocaine patch, movement   Disturbed Sleep: yes    Pattern of pain questions:  1.  Where is your pain the worst? Central low back   2.  Is your pain constant or intermittent? Intermittent   3.  Does bending forward make your typical pain worse? Yes   4.  Since the start of your back pain, has there been a change in your bowel or bladder? no  5.  What can't you do now that you use to be able to do? Weight training     Pts goals: do household tasks without back pain, work out without pain     Red Flag Screening:   Cough/Sneeze Strain: (--)  Bladder/Bowel: (--)  Falls: (--)  Night pain: (+)  Unexplained weight loss: (--)  General health: good    Objective    Postural examination/scapula alignment: Rounded shoulder and Slouched posture  Joint integrity: Firm end  feeling    Correction of posture: better with lumbar roll  Sitting: slouched   Standing: WNL    MOVEMENT LOSS - Lumbar   Norms ROM Loss Initial   Flexion Fingers touch toes, sacral angle >/= 70 deg, uniform spinal curvature, posterior weight shift  moderate loss   Extension ASIS surpasses toes, spine of scapulae surpasses heels, uniform spinal curve moderate loss   Side glide Right  minimal loss   Side glide Left  minimal loss   Rotation Right PT observes contralateral shoulder moderate loss   Rotation Left PT observes contralateral shoulder minimal loss     Lower Extremity Strength  Right LE  Left LE    Hip flexion: 4+/5 Hip flexion: 4+/5   Hip extension:  4/5 Hip extension: 4/5   Hip abduction: 4+/5 Hip abduction: 4+/5   Knee Flexion 5/5 Knee Flexion 5/5   Knee Extension 5/5 Knee Extension 5/5   Ankle dorsiflexion: 5/5 Ankle dorsiflexion: 5/5   Ankle plantarflexion: 5/5 Ankle plantarflexion: 5/5     GAIT:  Assistive Device used: none  Level of Assistance: independent  Patient displays the following gait deviations: no gait deviations observed.     Special Tests:   Test Name  Test Result   Prone Instability Test (--)   SI Joint Provocation Test (+)   Straight Leg Raise (--)   Neural Tension Test (--)   Crossed Straight Leg Raise (--)   Walking on toes NT   Walking on heels  NT     NEUROLOGICAL SCREENING:     Sensory deficits: Intact B LE    Reflexes:    Left Right   Patella Tendon 2+ 2+   Achilles Tendon 2+ 2+   Clonus (--) (--)     REPEATED TEST MOVEMENTS:    Baseline symptoms:  Repeated Flexion in Standing end range pain  worse   Repeated Extension in Standing end range pain  no worse  no better   Repeated Flexion in lying better   Repeated Extension in lying  end range pain  no worse  no better     STATIC TESTS and other movements:   Prone lie no effect   Prone lie on elbows better   Sitting slouched  worse   Sitting erect better   Standing slouched no effect   Standing erect  no effect   Lying prone in  extension  end range pain   Long sitting   NT   Sustained flexion better   Sustained prone using mat no effect     Lumbar testing Visit 2    OUTCOMES SELECTION:   Program Patient Outcome Measures    Oswestry Score:  17/50 = 34% disability     AQoL Score:  3/36 = 8% disability          Treatment     Total Treatment time separate from Evaluation: 15 minutes    Katarina received therapeutic exercises to develop/improve posture, lumbar ROM, strength, and muscular endurance for 10 minutes including the following exercises:     Written Home Exercises Provided: yes.    HEP AS FOLLOWS:  DKTC  PPT  Bridges  Clamshells with RTB    Exercises were reviewed and Katarina was able to demonstrate them prior to the end of the session. Katarina demonstrated good  understanding of the education provided.     See EMR under Patient Instructions for exercises provided 2/12/2025.    Katarina received the following manual therapy techniques:  were applied to the:  for 00 minutes.     MedX Testing:  MedX testing to be performed next visit    Therapeutic Education/Activity provided for 5 minutes:   - Patient was given an [facility] Visit 1 handout which discusses the following:  - what to expect in therapy  - an overview of the program, including health coaching and wellness  - importance of spinal hygiene, proper posture, lifting mechanics, sleep quality, and nutrition/hydration   - Isra roll trialed, recommended, and purchase information was provided.  - Patient received a handout regarding anticipated muscular soreness following the isometric test and strategies for management were reviewed with patient including stretching, using ice and scheduled rest.   - Patient received verbal education on the following:   - Healthy Back program   - purpose of the isometric test  - safe progression of lumbar strengthening, wellness approach, and systemic strengthening.   - safe usage of MedX machine and testing protocols.    Katarina received cold  pack for 00 minutes to  in [place].    Assessment   Katarina is a 39 y.o. female referred to [facility] with a medical diagnosis of M54.50,G89.29 (ICD-10-CM) - Chronic bilateral low back pain without sciatica. Upon physical assessment, pt demonstrates slouched posture in sitting, mild hip weakness bilaterally, and trunk and hip mobility deficits. All of the above noted supports potential lumbar classification as a pattern 1 PEN with recurrent/or consistent symptoms, thus pt is a good candidate for the Healthy Back Program. Pt would benefit from LE and trunk mobility training, stability training,  improved cardiovascular and muscular endurance, neuromuscular re-education for posture, coordination, and muscular recruitment and education on positional offloading techniques to decrease the intensity and frequency of flare-ups.      Pain Pattern: 1 PEN       Pt prognosis is Good.     Pt will benefit from skilled outpatient Physical Therapy to address the deficits stated above and in the chart below, to provide pt/family education, and to maximize pt's level of independence. Based on the above history and physical examination an active physical therapy program is recommended.      Plan of care discussed with patient: Yes  Pt's spiritual, cultural and educational needs considered and patient is agreeable to the plan of care and goals as stated below:     Anticipated Barriers for therapy: none    PT Evaluation Completed? Yes    Medical necessity is demonstrated by the following problem list:    History  Co-morbidities and personal factors that may impact the plan of care [] LOW: no personal factors / co-morbidities  [x] MODERATE: 1-2 personal factors / co-morbidities  [] HIGH: 3+ personal factors / co-morbidities    Moderate / High Support Documentation:   Co-morbidities affecting plan of care: HTN    Personal Factors:   no deficits     Examination  Body Structures and Functions, activity limitations and participation  restrictions that may impact the plan of care [] LOW: addressing 1-2 elements  [x] MODERATE: 3+ elements  [] HIGH: 4+ elements (please support below)    Moderate / High Support Documentation: decreased lumbar ROM, B hip weakness, poor posture      Clinical Presentation [x] LOW: stable  [] MODERATE: Evolving  [] HIGH: Unstable     Decision Making/ Complexity Score: low         GOALS: Pt is in agreement with the following goals.    Short term goals:  6 weeks or 10 visits   - Pt will demonstrate increased lumbar MedX ROM by at least 3 degrees from the initial ROM value with improvements noted in functional ROM and ability to perform ADLs. Appropriate and Ongoing  - Pt will demonstrate increased MedX average isometric strength value by 25% from initial test resulting in improved ability to perform bending, lifting, and carrying activities safely, confidently. Appropriate and Ongoing  - Pt will report a reduction in worst pain score by 1-2 points for improved tolerance for sitting. Appropriate and Ongoing  - Pt able to perform HEP correctly with minimal cueing or supervision from therapist to encourage independent management of symptoms. Appropriate and Ongoing    Long term goals: 10 weeks or 20 visits   - Pt will demonstrate increased lumbar MedX ROM by at least 6 degrees from initial ROM value, resulting in improved ability to perform functional forward bending while standing and sitting. Appropriate and Ongoing  - Pt will demonstrate increased MedX average isometric strength value by 50% from initial test resulting in improved ability to perform bending, lifting, and carrying activities safely and confidently. Appropriate and Ongoing  - Pt to demonstrate ability to independently control and reduce their pain through posture positioning and mechanical movements throughout a typical day. Appropriate and Ongoing  - Pt will demonstrate reduced pain and improved functional outcomes as reported on the Oswestry Disability  Index by reaching a score of 16% or less in order to demonstrate subjective improvement in pt's condition. . Appropriate and Ongoing  - Pt will demonstrate independence with the HEP at discharge. Appropriate and Ongoing  - Pt will be able to perform household tasks without increased back symptoms. (patient goal) Appropriate and Ongoing    Plan     Outpatient physical therapy 2x week for 10 weeks or 20 visits to include the following:   - Patient education  - Therapeutic exercise  - Manual therapy  - Performance testing   - Neuromuscular Re-education  - Therapeutic activity   - Modalities    Pt may be seen by PTA as part of the rehabilitation team.     Therapist: Olive Kuo, PT  2/13/2025

## 2025-02-12 ENCOUNTER — CLINICAL SUPPORT (OUTPATIENT)
Dept: REHABILITATION | Facility: HOSPITAL | Age: 40
End: 2025-02-12
Payer: COMMERCIAL

## 2025-02-12 ENCOUNTER — INFUSION (OUTPATIENT)
Dept: INFUSION THERAPY | Facility: HOSPITAL | Age: 40
End: 2025-02-12
Payer: COMMERCIAL

## 2025-02-12 VITALS
BODY MASS INDEX: 28.95 KG/M2 | DIASTOLIC BLOOD PRESSURE: 81 MMHG | HEIGHT: 65 IN | TEMPERATURE: 97 F | WEIGHT: 173.75 LBS | RESPIRATION RATE: 18 BRPM | HEART RATE: 64 BPM | SYSTOLIC BLOOD PRESSURE: 146 MMHG

## 2025-02-12 DIAGNOSIS — E61.1 IRON DEFICIENCY: Primary | ICD-10-CM

## 2025-02-12 DIAGNOSIS — M54.50 CHRONIC BILATERAL LOW BACK PAIN WITHOUT SCIATICA: ICD-10-CM

## 2025-02-12 DIAGNOSIS — M53.86 DECREASED RANGE OF MOTION OF LUMBAR SPINE: Primary | ICD-10-CM

## 2025-02-12 DIAGNOSIS — G89.29 CHRONIC BILATERAL LOW BACK PAIN WITHOUT SCIATICA: ICD-10-CM

## 2025-02-12 DIAGNOSIS — R29.898 DECREASED STRENGTH OF TRUNK AND BACK: ICD-10-CM

## 2025-02-12 PROCEDURE — 63600175 PHARM REV CODE 636 W HCPCS: Performed by: INTERNAL MEDICINE

## 2025-02-12 PROCEDURE — 97750 PHYSICAL PERFORMANCE TEST: CPT | Mod: 32

## 2025-02-12 PROCEDURE — 96375 TX/PRO/DX INJ NEW DRUG ADDON: CPT

## 2025-02-12 RX ORDER — SODIUM CHLORIDE 0.9 % (FLUSH) 0.9 %
10 SYRINGE (ML) INJECTION
Status: DISCONTINUED | OUTPATIENT
Start: 2025-02-12 | End: 2025-02-12 | Stop reason: HOSPADM

## 2025-02-12 RX ORDER — HEPARIN 100 UNIT/ML
500 SYRINGE INTRAVENOUS
Status: DISCONTINUED | OUTPATIENT
Start: 2025-02-12 | End: 2025-02-12 | Stop reason: HOSPADM

## 2025-02-12 RX ORDER — DIPHENHYDRAMINE HYDROCHLORIDE 50 MG/ML
50 INJECTION INTRAMUSCULAR; INTRAVENOUS ONCE AS NEEDED
Status: DISCONTINUED | OUTPATIENT
Start: 2025-02-12 | End: 2025-02-12 | Stop reason: HOSPADM

## 2025-02-12 RX ORDER — EPINEPHRINE 0.3 MG/.3ML
0.3 INJECTION SUBCUTANEOUS ONCE AS NEEDED
Status: DISCONTINUED | OUTPATIENT
Start: 2025-02-12 | End: 2025-02-12 | Stop reason: HOSPADM

## 2025-02-12 RX ADMIN — IRON SUCROSE 200 MG: 20 INJECTION, SOLUTION INTRAVENOUS at 09:02

## 2025-02-13 ENCOUNTER — CLINICAL SUPPORT (OUTPATIENT)
Dept: REHABILITATION | Facility: HOSPITAL | Age: 40
End: 2025-02-13
Payer: COMMERCIAL

## 2025-02-13 DIAGNOSIS — M53.86 DECREASED RANGE OF MOTION OF LUMBAR SPINE: Primary | ICD-10-CM

## 2025-02-13 DIAGNOSIS — R29.898 DECREASED STRENGTH OF TRUNK AND BACK: ICD-10-CM

## 2025-02-13 PROCEDURE — 97750 PHYSICAL PERFORMANCE TEST: CPT | Mod: 32

## 2025-02-13 NOTE — PROGRESS NOTES
[facility]      Name: Katarina Munguia  Clinic Number: [id number]    Therapy Diagnosis:   Encounter Diagnoses   Name Primary?    Decreased range of motion of lumbar spine Yes    Decreased strength of trunk and back      Physician: Priti Mckoy MD    Visit Date: 2025    Physician Orders: PT Eval and Treat  Medical Diagnosis from Referral: M54.50,G89.29 (ICD-10-CM) - Chronic bilateral low back pain without sciatica   Evaluation Date: 2025  Authorization Period Expiration: 2025  Plan of Care Expiration: 2025  Reassessment Due: 3/12/2025  Visit # / Visits authorized:   MedX testing visit 2     Time In: 8:00 AM  Time Out: 9:00 AM  Total Billable Time: 60 minutes  INSURANCE and OUTCOMES: Program Benefit Group with Lumbar Outcomes (Oswestry and AQoL) 1/3    Precautions: Standard    Pattern of pain determined: 1 PEN    Subjective   Katarina reports moderate low back pain at start of session.      Patient reports tolerating previous visit without adverse effects  Patient reports their pain to be  5 /10 on a 0-10 scale with 0 being no pain and 10 being the worst pain imaginable.  Pain Location: low back     Occupation: works from home; [employer] desk job    Leisure: working out, walking, going out with friends   Pt goals: do household tasks without back pain, work out without pain     Objective     Lumbar IM Testing Results:   Date of testin25   ROM 0-38 deg   Max Peak Torque 76    Min Peak Torque 16    Flex/Ext Ratio 4.8:1   % below normative data 67%     Oswestry Outcomes:  Initial score:  17/50 = 34% disability   Visit 5 score:  Goal: 16% disability or less      Treatment    Katarina received the treatments listed below:      Katarina received neuromuscular education    MedX testing performed day 2: Patient  received neuromuscular education to engage spinal musculature correctly for motor control and engagement of musculature for 15 minutes including the MedX exercise  component and practice and standard testing. MedX dynamic exercise and baseline isometric test performed with instructions to guide the patient safely through the testing procedure. Patient instructed to perform isometric test correctly and safely while building to an optimal force with a pain-free effort. Patient also instructed that they should feel support/pressure from MedX restraints but no pain/discomfort, and encouraged to report any pain to therapist. Patient demonstrated appropriate understanding of information and tolerance of test.  Education regarding purpose of test, safety during test given, and reviewed possible more soreness and strategies.        2/13/2025    10:22 AM   [facility]   Visit Number 2   VAS Pain Rating 5   Treadmill Time (in min.) 5 min   Lumbar Stretches - Slouch Overcorrection 10   Extension in Standing 10   Flexion in Lying 10   Lumbar Extension Seat Pad 1   Femur Restraint 6   Top Dead Center 24   Counterweight 261   Lumbar Flexion 39   Lumbar Extension 0   Lumbar Peak Torque 76 ft. lbs.   Min Torque 16   Test Percent Below Normative Data 67 %   Ice - Z Lie (in min.) 0         therapeutic exercises to develop strength, endurance, ROM, flexibility, posture, and core stabilization for 35 minutes including:    DKTC x 10  LTR x 10  Open books x 10  PPT / transverse abdominal activation x 10  SOC x 10  EIS x 10    Peripheral muscle strengthening which included 1 set of 15-20 repetitions at a slow, controlled 10-13 second per rep pace focused on strengthening supporting musculature for improved body mechanics and functional mobility.  Pt and therapist focused on proper form during treatment to ensure optimal strengthening of each targeted muscle group.  Machines were utilized including torso rotation, leg press, hip abd and hip add, leg ext.  Leg curl, triceps, biceps, chest and row added visit 3    therapeutic activities to improve functional performance for 00  minutes,  including:    manual therapy techniques:  were applied to the: low back for 00 minutes, including:      cold pack for 0 minutes to low back.    Home Exercises Provided and Patient Education Provided   Home exercises include: DKTC, PPT, bernard maldonado  Cardio program: visit 5  Lifting education date: visit 11  Posture/Lumbar roll: purchased  Fridge Magnet Discharge handout (date given):  Equipment at home/gym membership: yes [employer]      Education provided:   - PT role and POC  - HEP  - MedX testing results  - pacing and extension hold for max strength and endurance gains  - RPE scale    Written Home Exercises Provided: Patient instructed to cont prior HEP.  Exercises were reviewed and Katarina was able to demonstrate them prior to the end of the session.  Katarina demonstrated good  understanding of the education provided.     See EMR under Patient Instructions for exercises provided 2/13/2025.      Assessment     Pt presents to second healthy back visit reporting moderate low back pain.  She was able to demo HEP with Min VC for form. Added lower trunk rotation and open books to improve tolerance for mobility. Isometric strength testing performed today. She demonstrates poor flexion/extension ratio and 67% strength deficits as compared to women the same age indicating impaired motor control of lumbar paraspinals.    Pt was also able to complete half of the peripheral strengthening exercises without increased discomfort and will complete the complete circuit next visit as tolerated.    Patient is making good progress towards established goals.  Pt will continue to benefit from skilled outpatient physical therapy to address the deficits stated in the impairment chart, provide pt/family education and to maximize pt's level of independence in the home and community environment.     Anticipated Barriers for therapy: none  Pt's spiritual, cultural and educational needs considered and pt agreeable to plan of care and goals as stated  below:     Goals:   Short term goals:  6 weeks or 10 visits   - Pt will demonstrate increased lumbar MedX ROM by at least 3 degrees from the initial ROM value with improvements noted in functional ROM and ability to perform ADLs. Appropriate and Ongoing  - Pt will demonstrate increased MedX average isometric strength value by 25% from initial test resulting in improved ability to perform bending, lifting, and carrying activities safely, confidently. Appropriate and Ongoing  - Pt will report a reduction in worst pain score by 1-2 points for improved tolerance for sitting. Appropriate and Ongoing  - Pt able to perform HEP correctly with minimal cueing or supervision from therapist to encourage independent management of symptoms. Appropriate and Ongoing     Long term goals: 10 weeks or 20 visits   - Pt will demonstrate increased lumbar MedX ROM by at least 6 degrees from initial ROM value, resulting in improved ability to perform functional forward bending while standing and sitting. Appropriate and Ongoing  - Pt will demonstrate increased MedX average isometric strength value by 50% from initial test resulting in improved ability to perform bending, lifting, and carrying activities safely and confidently. Appropriate and Ongoing  - Pt to demonstrate ability to independently control and reduce their pain through posture positioning and mechanical movements throughout a typical day. Appropriate and Ongoing  - Pt will demonstrate reduced pain and improved functional outcomes as reported on the Oswestry Disability Index by reaching a score of 16% or less in order to demonstrate subjective improvement in pt's condition. . Appropriate and Ongoing  - Pt will demonstrate independence with the HEP at discharge. Appropriate and Ongoing  - Pt will be able to perform household tasks without increased back symptoms. (patient goal) Appropriate and Ongoing         Plan   Continue with established Plan of Care towards established PT  goals.       Therapist: Maggie Lockhart, PT  2/13/2025

## 2025-02-14 ENCOUNTER — TELEPHONE (OUTPATIENT)
Dept: INFUSION THERAPY | Facility: HOSPITAL | Age: 40
End: 2025-02-14
Payer: COMMERCIAL

## 2025-02-17 NOTE — PROGRESS NOTES
[facility]      Name: Katarina Munguia  Clinic Number: [id number]    Therapy Diagnosis:   Encounter Diagnosis   Name Primary?    Decreased strength of trunk and back Yes     Physician: Priti Mckoy MD    Visit Date: 2025    Physician Orders: PT Eval and Treat  Medical Diagnosis from Referral: M54.50,G89.29 (ICD-10-CM) - Chronic bilateral low back pain without sciatica   Evaluation Date: 2025  Authorization Period Expiration: 2025  Plan of Care Expiration: 2025  Reassessment Due: 3/12/2025  Visit # / Visits authorized: 3/20  MedX testing visit 2     Time In: 7:00 AM  Time Out: 8:00 AM  Total Billable Time: 55 minutes  INSURANCE and OUTCOMES: Program Benefit Group with Lumbar Outcomes (Oswestry and AQoL) 1/3    Precautions: Standard    Pattern of pain determined: 1 PEN    Subjective   Katarina reports back pain rated 5/10 today    Patient reports tolerating previous visit without adverse effects  Patient reports their pain to be  5 /10 on a 0-10 scale with 0 being no pain and 10 being the worst pain imaginable.  Pain Location: low back     Occupation: works from home; HR desk job    Leisure: working out, walking, going out with friends   Pt goals: do household tasks without back pain, work out without pain     Objective     Lumbar IM Testing Results:   Date of testin25   ROM 0-38 deg   Max Peak Torque 76    Min Peak Torque 16    Flex/Ext Ratio 4.8:1   % below normative data 67%     Oswestry Outcomes:  Initial score:  17/50 = 34% disability   Visit 5 score:  Goal: 16% disability or less    Treatment    Katarina received the treatments listed below:      Katarina received neuromuscular education      Medical MedX Treatment as follows:  MedX exercise started day 3:  Patient received neuromuscular education for 10 minutes via participation on the Medical MedX Machine. Therapist assisted patient in isolating and engaging spinal stabilization musculature in order to  improve functional ability and postural control. Patient performed exercise with therapist guidance in order to accurately use pacer function, avoid valsalva, and optimally exert effort within a safe and effective range via the [place]ak Exertion Rating Scale. Patient instructed to perform at a midrange of exertion and to complete 15-20 repetitions within appropriate split time, with proper technique, and while maintaining safety.          2/18/2025     7:12 AM   [facility]   Visit Number 3   VAS Pain Rating 5   Treadmill Time (in min.) 5 min   Lumbar Stretches - Slouch Overcorrection 10   Extension in Standing 10   Flexion in Lying 10   Lumbar Weight 35 lbs   Repetitions 15   Rating of Perceived Exertion 3   Ice - Z Lie (in min.) 0     therapeutic exercises to develop strength, endurance, ROM, flexibility, posture, and core stabilization for 45 minutes including:    DKTC x 10  LTR x 10  Open books x 10  PPT / transverse abdominal activation x 10  +Bridges x10  +Clamshells x10  SOC x 10  EIS x 10    Peripheral muscle strengthening which included 1 set of 15-20 repetitions at a slow, controlled 10-13 second per rep pace focused on strengthening supporting musculature for improved body mechanics and functional mobility.  Pt and therapist focused on proper form during treatment to ensure optimal strengthening of each targeted muscle group.  Machines were utilized including torso rotation, leg press, hip abd and hip add, leg ext.  Leg curl, triceps, biceps, chest and row added visit 3    therapeutic activities to improve functional performance for 00  minutes, including:    manual therapy techniques:  were applied to the: low back for 00 minutes, including:      cold pack for 0 minutes to low back.    Home Exercises Provided and Patient Education Provided   Home exercises include: DKTC, PPT, bridges, clams  Cardio program: visit 5  Lifting education date: visit 11  Posture/Lumbar roll: purchased  Fridge Magnet  Discharge handout (date given):  Equipment at home/gym membership: yes [employer]      Education provided:   - PT role and POC  - HEP  - MedX testing results  - pacing and extension hold for max strength and endurance gains  - RPE scale    Written Home Exercises Provided: Patient instructed to cont prior HEP.  Exercises were reviewed and Katarina was able to demonstrate them prior to the end of the session.  Katarina demonstrated good  understanding of the education provided.     See EMR under Patient Instructions for exercises provided 2/13/2025.    Assessment   Katarina returns with moderate low back pain rated 5/10 today.  She was able to demo HEP with Min VC for form. Added bridges and clamshells which she tolerated well. Lumbar MedX dynamic exercise was initiated at 35 ft/lbs and she completed 15 reps with RPE = 3/10. She was also able to complete all of the peripheral strengthening exercises. Will progress treatment per HB protocol.     Patient is making good progress towards established goals.  Pt will continue to benefit from skilled outpatient physical therapy to address the deficits stated in the impairment chart, provide pt/family education and to maximize pt's level of independence in the home and community environment.     Anticipated Barriers for therapy: none  Pt's spiritual, cultural and educational needs considered and pt agreeable to plan of care and goals as stated below:     Goals:   Short term goals:  6 weeks or 10 visits   - Pt will demonstrate increased lumbar MedX ROM by at least 3 degrees from the initial ROM value with improvements noted in functional ROM and ability to perform ADLs. Appropriate and Ongoing  - Pt will demonstrate increased MedX average isometric strength value by 25% from initial test resulting in improved ability to perform bending, lifting, and carrying activities safely, confidently. Appropriate and Ongoing  - Pt will report a reduction in worst pain score by 1-2 points for improved  tolerance for sitting. Appropriate and Ongoing  - Pt able to perform HEP correctly with minimal cueing or supervision from therapist to encourage independent management of symptoms. Appropriate and Ongoing     Long term goals: 10 weeks or 20 visits   - Pt will demonstrate increased lumbar MedX ROM by at least 6 degrees from initial ROM value, resulting in improved ability to perform functional forward bending while standing and sitting. Appropriate and Ongoing  - Pt will demonstrate increased MedX average isometric strength value by 50% from initial test resulting in improved ability to perform bending, lifting, and carrying activities safely and confidently. Appropriate and Ongoing  - Pt to demonstrate ability to independently control and reduce their pain through posture positioning and mechanical movements throughout a typical day. Appropriate and Ongoing  - Pt will demonstrate reduced pain and improved functional outcomes as reported on the Oswestry Disability Index by reaching a score of 16% or less in order to demonstrate subjective improvement in pt's condition. . Appropriate and Ongoing  - Pt will demonstrate independence with the HEP at discharge. Appropriate and Ongoing  - Pt will be able to perform household tasks without increased back symptoms. (patient goal) Appropriate and Ongoing       Plan   Continue with established Plan of Care towards established PT goals.       Therapist: Olive Kuo, PT  2/18/2025

## 2025-02-18 ENCOUNTER — CLINICAL SUPPORT (OUTPATIENT)
Dept: REHABILITATION | Facility: HOSPITAL | Age: 40
End: 2025-02-18
Payer: COMMERCIAL

## 2025-02-18 DIAGNOSIS — R29.898 DECREASED STRENGTH OF TRUNK AND BACK: Primary | ICD-10-CM

## 2025-02-18 PROCEDURE — 97750 PHYSICAL PERFORMANCE TEST: CPT | Mod: 32

## 2025-02-19 ENCOUNTER — INFUSION (OUTPATIENT)
Dept: INFUSION THERAPY | Facility: HOSPITAL | Age: 40
End: 2025-02-19
Payer: COMMERCIAL

## 2025-02-19 ENCOUNTER — E-VISIT (OUTPATIENT)
Dept: INTERNAL MEDICINE | Facility: CLINIC | Age: 40
End: 2025-02-19
Payer: COMMERCIAL

## 2025-02-19 VITALS
BODY MASS INDEX: 28.91 KG/M2 | HEART RATE: 70 BPM | RESPIRATION RATE: 18 BRPM | TEMPERATURE: 98 F | HEIGHT: 65 IN | SYSTOLIC BLOOD PRESSURE: 138 MMHG | OXYGEN SATURATION: 100 % | DIASTOLIC BLOOD PRESSURE: 65 MMHG

## 2025-02-19 DIAGNOSIS — E61.1 IRON DEFICIENCY: Primary | ICD-10-CM

## 2025-02-19 DIAGNOSIS — T75.3XXA MOTION SICKNESS, INITIAL ENCOUNTER: Primary | ICD-10-CM

## 2025-02-19 PROCEDURE — 63600175 PHARM REV CODE 636 W HCPCS: Performed by: INTERNAL MEDICINE

## 2025-02-19 PROCEDURE — 96374 THER/PROPH/DIAG INJ IV PUSH: CPT

## 2025-02-19 RX ORDER — HEPARIN 100 UNIT/ML
500 SYRINGE INTRAVENOUS
Status: DISCONTINUED | OUTPATIENT
Start: 2025-02-19 | End: 2025-02-19 | Stop reason: HOSPADM

## 2025-02-19 RX ORDER — SODIUM CHLORIDE 0.9 % (FLUSH) 0.9 %
10 SYRINGE (ML) INJECTION
Status: DISCONTINUED | OUTPATIENT
Start: 2025-02-19 | End: 2025-02-19 | Stop reason: HOSPADM

## 2025-02-19 RX ORDER — EPINEPHRINE 0.3 MG/.3ML
0.3 INJECTION SUBCUTANEOUS ONCE AS NEEDED
Status: DISCONTINUED | OUTPATIENT
Start: 2025-02-19 | End: 2025-02-19 | Stop reason: HOSPADM

## 2025-02-19 RX ORDER — SCOPOLAMINE 1 MG/3D
1 PATCH, EXTENDED RELEASE TRANSDERMAL
Qty: 3 PATCH | Refills: 0 | Status: SHIPPED | OUTPATIENT
Start: 2025-02-19

## 2025-02-19 RX ORDER — DIPHENHYDRAMINE HYDROCHLORIDE 50 MG/ML
50 INJECTION INTRAMUSCULAR; INTRAVENOUS ONCE AS NEEDED
Status: DISCONTINUED | OUTPATIENT
Start: 2025-02-19 | End: 2025-02-19 | Stop reason: HOSPADM

## 2025-02-19 RX ADMIN — IRON SUCROSE 200 MG: 20 INJECTION, SOLUTION INTRAVENOUS at 01:02

## 2025-02-19 NOTE — PLAN OF CARE
Pt seated, VS charted in flowsheet. No s/s of distress noted. Assessment done. PIV to R hand, flushed, blood return noted. PT received Venofer IVP. Tolerated well. 15 min post observation. Flushed IV with 10cc NS PIV discontinued. No adverse reactions noted.Ambulated off unit independently with no issues.

## 2025-02-19 NOTE — PROGRESS NOTES
Patient ID: Katarina Munguia is a 39 y.o. female.    Chief Complaint: General Illness (Entered automatically based on patient selection in [facility].)    The patient initiated a request through [facility] on 2/19/2025 for evaluation and management with a chief complaint of General Illness (Entered automatically based on patient selection in [facility].)     I evaluated the questionnaire submission on 2/19/2025.    [facility]-Medication    2/19/2025  3:24 PM CST - Filed by Patient   What do you need help with? Medication Request   Do you agree to participate in an E-Visit? Yes   If you have any of the following symptoms, please present to your local emergency room or call 911:  I acknowledge   Medication requests for narcotics will not be addressed via an E-Visit.  Please schedule an appointment. I acknowledge   Do you have any of the following pregnancy-related conditions? None   Do you want to address a new or existing medication? I would like to start a new medication that I do not already take   What is the main issue you would like addressed today? Medicine/patch for motion sickness   What is the name of the medication that you would like to start? Strongest prescription version of Dramamine   Have you taken a similar medication in the past? Yes   What was the name of the similar medication? Dramamine   Why are you no longer on that medication? No specific reason    What medical condition is the  medication intended to treat? Motion sickness   Provide any additional information you feel is important. Im going on a small boat overnight for 5 days and looking to see if i can get a prescription patch for motion sickness   Please attach any relevant images or files    Are you able to take your vital signs? No         Encounter Diagnosis   Name Primary?    Motion sickness, initial encounter Yes        No orders of the defined types were placed in this encounter.     Medications Ordered This Encounter    Medications    scopolamine (TRANSDERM-SCOP) 1.3-1.5 mg (1 mg over 3 days)     Sig: Place 1 patch onto the skin every 72 hours.     Dispense:  3 patch     Refill:  0        No follow-ups on file.      E-Visit Time Tracking:10

## 2025-02-20 ENCOUNTER — CLINICAL SUPPORT (OUTPATIENT)
Dept: REHABILITATION | Facility: HOSPITAL | Age: 40
End: 2025-02-20
Payer: COMMERCIAL

## 2025-02-20 DIAGNOSIS — M53.86 DECREASED RANGE OF MOTION OF LUMBAR SPINE: Primary | ICD-10-CM

## 2025-02-20 DIAGNOSIS — R29.898 DECREASED STRENGTH OF TRUNK AND BACK: ICD-10-CM

## 2025-02-20 PROCEDURE — 97750 PHYSICAL PERFORMANCE TEST: CPT | Mod: 32

## 2025-02-20 NOTE — PROGRESS NOTES
[facility]      Name: Katarina Munguia  Clinic Number: [id number]    Therapy Diagnosis:   Encounter Diagnoses   Name Primary?    Decreased range of motion of lumbar spine Yes    Decreased strength of trunk and back      Physician: Priti Mckoy MD    Visit Date: 2025    Physician Orders: PT Eval and Treat  Medical Diagnosis from Referral: M54.50,G89.29 (ICD-10-CM) - Chronic bilateral low back pain without sciatica   Evaluation Date: 2025  Authorization Period Expiration: 2025  Plan of Care Expiration: 2025  Reassessment Due: 3/12/2025  Visit # / Visits authorized:   MedX testing visit 2     Time In: 7:05 AM  Time Out: 7:55 AM  Total Billable Time: 50 minutes  INSURANCE and OUTCOMES: Program Benefit Group with Lumbar Outcomes (Oswestry and AQoL) 1/3    Precautions: Standard    Pattern of pain determined: 1 PEN    Subjective   Katarina reports her back is feeling better today.     Patient reports tolerating previous visit without adverse effects  Patient reports their pain to be 3/10 on a 0-10 scale with 0 being no pain and 10 being the worst pain imaginable.  Pain Location: low back     Occupation: works from home; HR desk job    Leisure: working out, walking, going out with friends   Pt goals: do household tasks without back pain, work out without pain     Objective     Lumbar IM Testing Results:   Date of testin25   ROM 0-38 deg   Max Peak Torque 76    Min Peak Torque 16    Flex/Ext Ratio 4.8:1   % below normative data 67%     Oswestry Outcomes:  Initial score:  17/50 = 34% disability   Visit 5 score:  Goal: 16% disability or less    Treatment    Katarina received the treatments listed below:      Katarina received neuromuscular education      Medical MedX Treatment as follows:  MedX exercise started day 3:  Patient received neuromuscular education for 10 minutes via participation on the Medical MedX Machine. Therapist assisted patient in isolating and  engaging spinal stabilization musculature in order to improve functional ability and postural control. Patient performed exercise with therapist guidance in order to accurately use pacer function, avoid valsalva, and optimally exert effort within a safe and effective range via the Dipak Exertion Rating Scale. Patient instructed to perform at a midrange of exertion and to complete 15-20 repetitions within appropriate split time, with proper technique, and while maintaining safety.          2/20/2025     8:18 AM   [facility]   Visit Number 4   VAS Pain Rating 3   Treadmill Time (in min.) 5 min   Lumbar Stretches - Slouch Overcorrection 10   Extension in Standing 10   Flexion in Lying 10   Lumbar Weight 35 lbs   Repetitions 20   Rating of Perceived Exertion 3   Ice - Z Lie (in min.) 0     therapeutic exercises to develop strength, endurance, ROM, flexibility, posture, and core stabilization for 45 minutes including:    DKTC x 10  LTR x 10  Open books x 10  PPT / transverse abdominal activation x 10  Bridges c/ red TB x15  Clamshells c/ red TB x15  SOC x 10  EIS x 10    Peripheral muscle strengthening which included 1 set of 15-20 repetitions at a slow, controlled 10-13 second per rep pace focused on strengthening supporting musculature for improved body mechanics and functional mobility.  Pt and therapist focused on proper form during treatment to ensure optimal strengthening of each targeted muscle group.  Machines were utilized including torso rotation, leg press, hip abd and hip add, leg ext.  Leg curl, triceps, biceps, chest and row added visit 3    therapeutic activities to improve functional performance for 00  minutes, including:    manual therapy techniques:  were applied to the: low back for 00 minutes, including:      cold pack for 0 minutes to low back.    Home Exercises Provided and Patient Education Provided   Home exercises include: DKTC, PPT, bridges, clams  Cardio program: visit 5  Lifting  education date: visit 11  Posture/Lumbar roll: purchased  Fridge Magnet Discharge handout (date given):  Equipment at home/gym membership: yes [employer]      Education provided:   - PT role and POC  - HEP  - MedX testing results  - pacing and extension hold for max strength and endurance gains  - RPE scale    Written Home Exercises Provided: Patient instructed to cont prior HEP.  Exercises were reviewed and Katarina was able to demonstrate them prior to the end of the session.  Katarina demonstrated good  understanding of the education provided.     See EMR under Patient Instructions for exercises provided 2/13/2025.    Assessment   Katarina returns with low back discomfort rated 3/10 today. Treatment continued with mobility, strengthening, and neuro re-education exercises. She was  progressed by adding red TB resistance for bridges and clamshells which she tolerated well. Lumbar MedX resistance was maintained at 35 ft/lbs and she completed 20 reps with RPE = 3/10. She was also able to complete all of the peripheral strengthening exercises. Will progress treatment per HB protocol.     Patient is making good progress towards established goals.  Pt will continue to benefit from skilled outpatient physical therapy to address the deficits stated in the impairment chart, provide pt/family education and to maximize pt's level of independence in the home and community environment.     Anticipated Barriers for therapy: none  Pt's spiritual, cultural and educational needs considered and pt agreeable to plan of care and goals as stated below:     Goals:   Short term goals:  6 weeks or 10 visits   - Pt will demonstrate increased lumbar MedX ROM by at least 3 degrees from the initial ROM value with improvements noted in functional ROM and ability to perform ADLs. Appropriate and Ongoing  - Pt will demonstrate increased MedX average isometric strength value by 25% from initial test resulting in improved ability to perform bending, lifting, and  carrying activities safely, confidently. Appropriate and Ongoing  - Pt will report a reduction in worst pain score by 1-2 points for improved tolerance for sitting. Appropriate and Ongoing  - Pt able to perform HEP correctly with minimal cueing or supervision from therapist to encourage independent management of symptoms. Appropriate and Ongoing     Long term goals: 10 weeks or 20 visits   - Pt will demonstrate increased lumbar MedX ROM by at least 6 degrees from initial ROM value, resulting in improved ability to perform functional forward bending while standing and sitting. Appropriate and Ongoing  - Pt will demonstrate increased MedX average isometric strength value by 50% from initial test resulting in improved ability to perform bending, lifting, and carrying activities safely and confidently. Appropriate and Ongoing  - Pt to demonstrate ability to independently control and reduce their pain through posture positioning and mechanical movements throughout a typical day. Appropriate and Ongoing  - Pt will demonstrate reduced pain and improved functional outcomes as reported on the Oswestry Disability Index by reaching a score of 16% or less in order to demonstrate subjective improvement in pt's condition. . Appropriate and Ongoing  - Pt will demonstrate independence with the HEP at discharge. Appropriate and Ongoing  - Pt will be able to perform household tasks without increased back symptoms. (patient goal) Appropriate and Ongoing     Plan   Continue with established Plan of Care towards established PT goals.       Therapist: Olive Kuo, PT  2/20/2025

## 2025-02-25 ENCOUNTER — CLINICAL SUPPORT (OUTPATIENT)
Dept: REHABILITATION | Facility: HOSPITAL | Age: 40
End: 2025-02-25
Payer: COMMERCIAL

## 2025-02-25 DIAGNOSIS — R29.898 DECREASED STRENGTH OF TRUNK AND BACK: ICD-10-CM

## 2025-02-25 DIAGNOSIS — M53.86 DECREASED RANGE OF MOTION OF LUMBAR SPINE: Primary | ICD-10-CM

## 2025-02-25 PROCEDURE — 97750 PHYSICAL PERFORMANCE TEST: CPT | Mod: 32

## 2025-02-25 NOTE — PROGRESS NOTES
[facility]      Name: Katarina Munguia  Clinic Number: [id number]    Therapy Diagnosis:   Encounter Diagnoses   Name Primary?    Decreased range of motion of lumbar spine Yes    Decreased strength of trunk and back      Physician: Priti Mckoy MD    Visit Date: 2025    Physician Orders: PT Eval and Treat  Medical Diagnosis from Referral: M54.50,G89.29 (ICD-10-CM) - Chronic bilateral low back pain without sciatica   Evaluation Date: 2025  Authorization Period Expiration: 2025  Plan of Care Expiration: 2025  Reassessment Due: 3/12/2025  Visit # / Visits authorized:  increase ROM visit 6  MedX testing visit 2     Time In: 7:00 AM  Time Out: 755  Total Billable Time: 50minutes  INSURANCE and OUTCOMES: Program Benefit Group with Lumbar Outcomes (Oswestry and AQoL) 1/3    Precautions: Standard    Pattern of pain determined: 1 PEN    Subjective   Katarina reports her back is feeling better overall since starting the program    Patient reports tolerating previous visit without adverse effects  Patient reports their pain to be 2/10 on a 0-10 scale with 0 being no pain and 10 being the worst pain imaginable.  Pain Location: low back     Occupation: works from home; HR desk job    Leisure: working out, walking, going out with friends   Pt goals: do household tasks without back pain, work out without pain     Objective     Lumbar IM Testing Results:   Date of testin25   ROM 0-38 deg   Max Peak Torque 76    Min Peak Torque 16    Flex/Ext Ratio 4.8:1   % below normative data 67%     Oswestry Outcomes:  Initial score:  17/50 = 34% disability   Visit 5 score:  Goal: 16% disability or less    Treatment    Katarina received the treatments listed below:      Katarina received neuromuscular education      Medical MedX Treatment as follows:  MedX exercise started day 3:  Patient received neuromuscular education for 10 minutes via participation on the Medical MedX Machine.  Therapist assisted patient in isolating and engaging spinal stabilization musculature in order to improve functional ability and postural control. Patient performed exercise with therapist guidance in order to accurately use pacer function, avoid valsalva, and optimally exert effort within a safe and effective range via the Dipak Exertion Rating Scale. Patient instructed to perform at a midrange of exertion and to complete 15-20 repetitions within appropriate split time, with proper technique, and while maintaining safety.          2/25/2025     7:12 AM   [facility]   Visit Number 5   VAS Pain Rating 2   Treadmill Time (in min.) 5 min   Lumbar Stretches - Slouch Overcorrection 10   Extension in Standing 10   Flexion in Lying 10   Lumbar Weight 38 lbs   Repetitions 20   Rating of Perceived Exertion 2       therapeutic exercises to develop strength, endurance, ROM, flexibility, posture, and core stabilization for 50 minutes including:    DKTC x 10  LTR x 10  Open books x 10   transverse abdominal  c/ SLR  x 10  Bridges c/ red TB x20x  Clamshells c/ red TB x20x  SOC x 10  EIS x 10    Peripheral muscle strengthening which included 1 set of 15-20 repetitions at a slow, controlled 10-13 second per rep pace focused on strengthening supporting musculature for improved body mechanics and functional mobility.  Pt and therapist focused on proper form during treatment to ensure optimal strengthening of each targeted muscle group.  Machines were utilized including torso rotation, leg press, hip abd and hip add, leg ext.  Leg curl, triceps, biceps, chest and row added visit 3    therapeutic activities to improve functional performance for 00  minutes, including:    manual therapy techniques:  were applied to the: low back for 00 minutes, including:      cold pack for 0 minutes to low back.    Home Exercises Provided and Patient Education Provided   Home exercises include: DKTC, PPT, bridges, bernard  Cardio program: visit 5  2/25/25  Lifting education date: visit 11  Posture/Lumbar roll: purchased  Fridge Magnet Discharge handout (date given):  Equipment at home/gym membership: yes [employer]      Education provided:   - PT role and POC  - HEP  - MedX testing results  - pacing and extension hold for max strength and endurance gains  - RPE scale    Written Home Exercises Provided: Patient instructed to cont prior HEP.  Exercises were reviewed and Katarina was able to demonstrate them prior to the end of the session.  Katarina demonstrated good  understanding of the education provided.     See EMR under Patient Instructions for exercises provided 2/13/2025.    Assessment   Katarina returns with low back discomfort rated 2/10 today. Reviewed visit 5 cardio hand out with pt today. Treatment continued with mobility, strengthening, and neuro re-education exercises.  Added SLR c/ TrA activation and reps to bridges and SL clams, which she tolerated well. Lumbar MedX resistance was increased to 38ft/lbs and she completed 20 reps with RPE = 2/10. Increase ROM and 10% weight increase next visit. She was also able to complete all of the peripheral strengthening exercises. Will progress treatment per HB protocol.     Patient is making good progress towards established goals.  Pt will continue to benefit from skilled outpatient physical therapy to address the deficits stated in the impairment chart, provide pt/family education and to maximize pt's level of independence in the home and community environment.     Anticipated Barriers for therapy: none  Pt's spiritual, cultural and educational needs considered and pt agreeable to plan of care and goals as stated below:     Goals:   Short term goals:  6 weeks or 10 visits   - Pt will demonstrate increased lumbar MedX ROM by at least 3 degrees from the initial ROM value with improvements noted in functional ROM and ability to perform ADLs. Appropriate and Ongoing  - Pt will demonstrate increased MedX average isometric  strength value by 25% from initial test resulting in improved ability to perform bending, lifting, and carrying activities safely, confidently. Appropriate and Ongoing  - Pt will report a reduction in worst pain score by 1-2 points for improved tolerance for sitting. Appropriate and Ongoing  - Pt able to perform HEP correctly with minimal cueing or supervision from therapist to encourage independent management of symptoms. Appropriate and Ongoing     Long term goals: 10 weeks or 20 visits   - Pt will demonstrate increased lumbar MedX ROM by at least 6 degrees from initial ROM value, resulting in improved ability to perform functional forward bending while standing and sitting. Appropriate and Ongoing  - Pt will demonstrate increased MedX average isometric strength value by 50% from initial test resulting in improved ability to perform bending, lifting, and carrying activities safely and confidently. Appropriate and Ongoing  - Pt to demonstrate ability to independently control and reduce their pain through posture positioning and mechanical movements throughout a typical day. Appropriate and Ongoing  - Pt will demonstrate reduced pain and improved functional outcomes as reported on the Oswestry Disability Index by reaching a score of 16% or less in order to demonstrate subjective improvement in pt's condition. . Appropriate and Ongoing  - Pt will demonstrate independence with the HEP at discharge. Appropriate and Ongoing  - Pt will be able to perform household tasks without increased back symptoms. (patient goal) Appropriate and Ongoing     Plan   Continue with established Plan of Care towards established PT goals.       Therapist: Ese Sheldon, PT  2/25/2025

## 2025-02-26 ENCOUNTER — INFUSION (OUTPATIENT)
Dept: INFUSION THERAPY | Facility: HOSPITAL | Age: 40
End: 2025-02-26
Payer: COMMERCIAL

## 2025-02-26 VITALS
HEIGHT: 65 IN | SYSTOLIC BLOOD PRESSURE: 153 MMHG | BODY MASS INDEX: 28.95 KG/M2 | DIASTOLIC BLOOD PRESSURE: 89 MMHG | HEART RATE: 52 BPM | WEIGHT: 173.75 LBS | TEMPERATURE: 98 F

## 2025-02-26 DIAGNOSIS — E61.1 IRON DEFICIENCY: Primary | ICD-10-CM

## 2025-02-26 PROCEDURE — 63600175 PHARM REV CODE 636 W HCPCS: Performed by: INTERNAL MEDICINE

## 2025-02-26 PROCEDURE — 96374 THER/PROPH/DIAG INJ IV PUSH: CPT

## 2025-02-26 RX ORDER — SODIUM CHLORIDE 0.9 % (FLUSH) 0.9 %
10 SYRINGE (ML) INJECTION
Status: DISCONTINUED | OUTPATIENT
Start: 2025-02-26 | End: 2025-02-26 | Stop reason: HOSPADM

## 2025-02-26 RX ORDER — EPINEPHRINE 0.3 MG/.3ML
0.3 INJECTION SUBCUTANEOUS ONCE AS NEEDED
Status: DISCONTINUED | OUTPATIENT
Start: 2025-02-26 | End: 2025-02-26 | Stop reason: HOSPADM

## 2025-02-26 RX ORDER — HEPARIN 100 UNIT/ML
500 SYRINGE INTRAVENOUS
Status: DISCONTINUED | OUTPATIENT
Start: 2025-02-26 | End: 2025-02-26 | Stop reason: HOSPADM

## 2025-02-26 RX ORDER — DIPHENHYDRAMINE HYDROCHLORIDE 50 MG/ML
50 INJECTION INTRAMUSCULAR; INTRAVENOUS ONCE AS NEEDED
Status: DISCONTINUED | OUTPATIENT
Start: 2025-02-26 | End: 2025-02-26 | Stop reason: HOSPADM

## 2025-02-26 RX ADMIN — IRON SUCROSE 200 MG: 20 INJECTION, SOLUTION INTRAVENOUS at 10:02

## 2025-02-27 ENCOUNTER — CLINICAL SUPPORT (OUTPATIENT)
Dept: REHABILITATION | Facility: HOSPITAL | Age: 40
End: 2025-02-27
Payer: COMMERCIAL

## 2025-02-27 DIAGNOSIS — M53.86 DECREASED RANGE OF MOTION OF LUMBAR SPINE: Primary | ICD-10-CM

## 2025-02-27 DIAGNOSIS — R29.898 DECREASED STRENGTH OF TRUNK AND BACK: ICD-10-CM

## 2025-02-27 PROCEDURE — 97750 PHYSICAL PERFORMANCE TEST: CPT | Mod: 32

## 2025-02-27 NOTE — PROGRESS NOTES
[facility]      Name: Katarina Munguia  Clinic Number: [id number]    Therapy Diagnosis:   Encounter Diagnoses   Name Primary?    Decreased range of motion of lumbar spine Yes    Decreased strength of trunk and back      Physician: Priti Mckoy MD    Visit Date: 3/3/2025    Physician Orders: PT Eval and Treat  Medical Diagnosis from Referral: M54.50,G89.29 (ICD-10-CM) - Chronic bilateral low back pain without sciatica   Evaluation Date: 2025  Authorization Period Expiration: 2025  Plan of Care Expiration: 2025  Reassessment Due: 3/12/2025  Visit # / Visits authorized:    MedX testing visit 2     Time In: 9:00 AM  Time Out: 9:55 AM   Total Billable Time:  50  minutes  INSURANCE and OUTCOMES: Program Benefit Group with Lumbar Outcomes (Oswestry and AQoL) 1/3    Precautions: Standard    Pattern of pain determined: 1 PEN    Subjective   Katarina reports minimal lower back pain/stiffness. States that treatments have been going well so far.     Patient reports tolerating previous visit without adverse effects  Patient reports their pain to be 3/10 on a 0-10 scale with 0 being no pain and 10 being the worst pain imaginable.  Pain Location: low back     Occupation: works from home; HR desk job    Leisure: working out, walking, going out with friends   Pt goals: do household tasks without back pain, work out without pain     Objective     Lumbar IM Testing Results:   Date of testin25   ROM 0-38 deg   Max Peak Torque 76    Min Peak Torque 16    Flex/Ext Ratio 4.8:1   % below normative data 67%     Oswestry Outcomes:  Initial score:  17/50 = 34% disability   Visit 5 score:  Goal: 16% disability or less    Treatment    Katarina received the treatments listed below:      Katarina received neuromuscular education      Medical MedX Treatment as follows:  MedX exercise started day 3:  Patient received neuromuscular education for 10 minutes via participation on the Medical MedX  Machine. Therapist assisted patient in isolating and engaging spinal stabilization musculature in order to improve functional ability and postural control. Patient performed exercise with therapist guidance in order to accurately use pacer function, avoid valsalva, and optimally exert effort within a safe and effective range via the Dipak Exertion Rating Scale. Patient instructed to perform at a midrange of exertion and to complete 15-20 repetitions within appropriate split time, with proper technique, and while maintaining safety.            3/3/2025     9:28 AM   [facility] - Short   Visit Number 7   VAS Pain Rating 3   Treadmill Time (in min.) 5 min   Extension in Standing 10   Flexion in Lying 10   Lumbar Weight 45 lbs   Repetitions 15   Rating of Perceived Exertion 3      therapeutic exercises to develop strength, endurance, ROM, flexibility, posture, and core stabilization for 40 minutes including:    DKTC x 10  LTR x 10  Open books x 10   transverse abdominal  c/ SLR  x 10 + 1#   Bridges c/ GTB x20x  Clamshells c/GTB  x20x  +Cat/COw x 10  + LE bird dog x 10  SOC x 10-NP  EIS x 10    Peripheral muscle strengthening which included 1 set of 15-20 repetitions at a slow, controlled 10-13 second per rep pace focused on strengthening supporting musculature for improved body mechanics and functional mobility.  Pt and therapist focused on proper form during treatment to ensure optimal strengthening of each targeted muscle group.  Machines were utilized including torso rotation, leg press, hip abd and hip add, leg ext.  Leg curl, triceps, biceps, chest and row added visit 3     manual therapy techniques:  were applied to the: low back for 00 minutes, including:      cold pack for 0 minutes to low back.    Home Exercises Provided and Patient Education Provided   Home exercises include: DKTC, PPT, erica, bernard  Cardio program: visit 5 2/25/25  Lifting education date: visit 11  Posture/Lumbar roll:  purchased  [employer] it Magnet Discharge handout (date given):  Equipment at home/gym membership: yes [employer]      Education provided:   -  cues w/exs  MedX performance  Precor ex performance    Written Home Exercises Provided: Patient instructed to cont prior HEP.  Exercises were reviewed and Katarina was able to demonstrate them prior to the end of the session.  Katarina demonstrated good  understanding of the education provided.     See EMR under Patient Instructions for exercises provided 2/13/2025.    Assessment   Katarina returns reporting minimal lower back discomfort/stiffness. Treatment continued with mobility, strengthening, and neuro re-education exercises. Added Cat cow stretch, LE bird dog, 1# resistance for TrA + SLR and progressed to GTB for bridging with band and clamshells. . She was able to perform ex's with min cues and without increased pain. Lumbar MedX ROM resistance was increased to 45 ft/lbs and she completed 15 reps with RPE = 3/10.  She was also able to complete all of the peripheral strengthening exercises. Will progress treatment per HB protocol.     Patient is making good progress towards established goals.  Pt will continue to benefit from skilled outpatient physical therapy to address the deficits stated in the impairment chart, provide pt/family education and to maximize pt's level of independence in the home and community environment.     Anticipated Barriers for therapy: none  Pt's spiritual, cultural and educational needs considered and pt agreeable to plan of care and goals as stated below:     Goals:   Short term goals:  6 weeks or 10 visits   - Pt will demonstrate increased lumbar MedX ROM by at least 3 degrees from the initial ROM value with improvements noted in functional ROM and ability to perform ADLs. Appropriate and Ongoing  - Pt will demonstrate increased MedX average isometric strength value by 25% from initial test resulting in improved ability to perform bending, lifting, and carrying  activities safely, confidently. Appropriate and Ongoing  - Pt will report a reduction in worst pain score by 1-2 points for improved tolerance for sitting. Appropriate and Ongoing  - Pt able to perform HEP correctly with minimal cueing or supervision from therapist to encourage independent management of symptoms. Appropriate and Ongoing     Long term goals: 10 weeks or 20 visits   - Pt will demonstrate increased lumbar MedX ROM by at least 6 degrees from initial ROM value, resulting in improved ability to perform functional forward bending while standing and sitting. Appropriate and Ongoing  - Pt will demonstrate increased MedX average isometric strength value by 50% from initial test resulting in improved ability to perform bending, lifting, and carrying activities safely and confidently. Appropriate and Ongoing  - Pt to demonstrate ability to independently control and reduce their pain through posture positioning and mechanical movements throughout a typical day. Appropriate and Ongoing  - Pt will demonstrate reduced pain and improved functional outcomes as reported on the Oswestry Disability Index by reaching a score of 16% or less in order to demonstrate subjective improvement in pt's condition. . Appropriate and Ongoing  - Pt will demonstrate independence with the HEP at discharge. Appropriate and Ongoing  - Pt will be able to perform household tasks without increased back symptoms. (patient goal) Appropriate and Ongoing     Plan   Continue with established Plan of Care towards established PT goals.       Therapist: Chandan Villela, PTA  2/27/2025

## 2025-03-02 DIAGNOSIS — N93.9 ABNORMAL UTERINE BLEEDING (AUB): ICD-10-CM

## 2025-03-03 ENCOUNTER — TELEPHONE (OUTPATIENT)
Dept: INFUSION THERAPY | Facility: HOSPITAL | Age: 40
End: 2025-03-03
Payer: COMMERCIAL

## 2025-03-03 ENCOUNTER — CLINICAL SUPPORT (OUTPATIENT)
Dept: REHABILITATION | Facility: HOSPITAL | Age: 40
End: 2025-03-03
Attending: INTERNAL MEDICINE
Payer: COMMERCIAL

## 2025-03-03 DIAGNOSIS — R29.898 DECREASED STRENGTH OF TRUNK AND BACK: ICD-10-CM

## 2025-03-03 DIAGNOSIS — M53.86 DECREASED RANGE OF MOTION OF LUMBAR SPINE: Primary | ICD-10-CM

## 2025-03-03 PROCEDURE — 97750 PHYSICAL PERFORMANCE TEST: CPT | Mod: 32

## 2025-03-03 RX ORDER — NORETHINDRONE 0.35 MG/1
1 TABLET ORAL DAILY
Qty: 84 TABLET | Refills: 3 | Status: SHIPPED | OUTPATIENT
Start: 2025-03-03

## 2025-03-03 NOTE — TELEPHONE ENCOUNTER
Refill Routing Note   Medication(s) are not appropriate for processing by [facility] for the following reason(s):        New or recently adjusted medication  Alternate pharmacy now requested    [facility] action(s):  Defer        Medication Therapy Plan: Chg of pharmacy requested      Appointments  past 12m or future 3m with PCP    Date Provider   Last Visit   12/4/2024 Shilpa Wells MD   Next Visit   4/14/2025 Shilpa Wells MD   ED visits in past 90 days: 0        Note composed:9:57 AM 03/03/2025

## 2025-03-05 ENCOUNTER — INFUSION (OUTPATIENT)
Dept: INFUSION THERAPY | Facility: HOSPITAL | Age: 40
End: 2025-03-05
Payer: COMMERCIAL

## 2025-03-05 VITALS
TEMPERATURE: 98 F | DIASTOLIC BLOOD PRESSURE: 86 MMHG | SYSTOLIC BLOOD PRESSURE: 155 MMHG | HEART RATE: 58 BPM | RESPIRATION RATE: 18 BRPM | OXYGEN SATURATION: 100 %

## 2025-03-05 DIAGNOSIS — E61.1 IRON DEFICIENCY: Primary | ICD-10-CM

## 2025-03-05 PROCEDURE — 96374 THER/PROPH/DIAG INJ IV PUSH: CPT

## 2025-03-05 PROCEDURE — 25000003 PHARM REV CODE 250: Performed by: INTERNAL MEDICINE

## 2025-03-05 PROCEDURE — 63600175 PHARM REV CODE 636 W HCPCS: Performed by: INTERNAL MEDICINE

## 2025-03-05 RX ORDER — EPINEPHRINE 0.3 MG/.3ML
0.3 INJECTION SUBCUTANEOUS ONCE AS NEEDED
Status: DISCONTINUED | OUTPATIENT
Start: 2025-03-05 | End: 2025-03-05 | Stop reason: HOSPADM

## 2025-03-05 RX ORDER — SODIUM CHLORIDE 0.9 % (FLUSH) 0.9 %
10 SYRINGE (ML) INJECTION
Status: DISCONTINUED | OUTPATIENT
Start: 2025-03-05 | End: 2025-03-05 | Stop reason: HOSPADM

## 2025-03-05 RX ORDER — DIPHENHYDRAMINE HYDROCHLORIDE 50 MG/ML
50 INJECTION INTRAMUSCULAR; INTRAVENOUS ONCE AS NEEDED
Status: DISCONTINUED | OUTPATIENT
Start: 2025-03-05 | End: 2025-03-05 | Stop reason: HOSPADM

## 2025-03-05 RX ORDER — HEPARIN 100 UNIT/ML
500 SYRINGE INTRAVENOUS
Status: DISCONTINUED | OUTPATIENT
Start: 2025-03-05 | End: 2025-03-05 | Stop reason: HOSPADM

## 2025-03-05 RX ADMIN — SODIUM CHLORIDE: 9 INJECTION, SOLUTION INTRAVENOUS at 02:03

## 2025-03-05 RX ADMIN — IRON SUCROSE 200 MG: 20 INJECTION, SOLUTION INTRAVENOUS at 02:03

## 2025-03-05 NOTE — PLAN OF CARE
1445 Patient tolerated venofer given IVP. She was observed for 20 minutes post injection with no s/s of reaction and no complaints. PIV removed and catheter tip intact. She declined the AVS and ambulated out.

## 2025-03-14 ENCOUNTER — CLINICAL SUPPORT (OUTPATIENT)
Dept: REHABILITATION | Facility: HOSPITAL | Age: 40
End: 2025-03-14
Attending: INTERNAL MEDICINE
Payer: COMMERCIAL

## 2025-03-14 DIAGNOSIS — R29.898 DECREASED STRENGTH OF TRUNK AND BACK: ICD-10-CM

## 2025-03-14 DIAGNOSIS — M53.86 DECREASED RANGE OF MOTION OF LUMBAR SPINE: Primary | ICD-10-CM

## 2025-03-14 PROCEDURE — 97750 PHYSICAL PERFORMANCE TEST: CPT | Mod: 32

## 2025-03-14 NOTE — PROGRESS NOTES
[facility]      Name: Katarina Munguia  Clinic Number: [id number]    Therapy Diagnosis:   Encounter Diagnoses   Name Primary?    Decreased range of motion of lumbar spine Yes    Decreased strength of trunk and back      Physician: Priti Mckoy MD    Visit Date: 3/14/2025    Physician Orders: PT Eval and Treat  Medical Diagnosis from Referral: M54.50,G89.29 (ICD-10-CM) - Chronic bilateral low back pain without sciatica   Evaluation Date: 2025  Authorization Period Expiration: 2025  Plan of Care Expiration: 2025  Reassessment Due: 3/12/2025  Visit # / Visits authorized:    MedX testing visit 2     Time In:7  Time Out: 750  Total Billable Time:  50  minutes  INSURANCE and OUTCOMES: Program Benefit Group with Lumbar Outcomes (Oswestry and AQoL) 1/3    Precautions: Standard    Pattern of pain determined: 1 PEN    Subjective   Katarina reports minimal lower back pain/stiffness.Pt was on vacation x 1 wk and reports she did not stretch as often, but reports her LBP held up well.    Patient reports tolerating previous visit without adverse effects  Patient reports their pain to be 3/10 on a 0-10 scale with 0 being no pain and 10 being the worst pain imaginable.  Pain Location: low back     Occupation: works from home; [employer] desk job    Leisure: working out, walking, going out with friends   Pt goals: do household tasks without back pain, work out without pain     Objective     Lumbar IM Testing Results:   Date of testin25   ROM 0-38 deg   Max Peak Torque 76    Min Peak Torque 16    Flex/Ext Ratio 4.8:1   % below normative data 67%     Oswestry Outcomes:  Initial score:  17/50 = 34% disability   Visit 5 score:  Goal: 16% disability or less    Treatment    Katarina received the treatments listed below:      Katarina received neuromuscular education      Medical MedX Treatment as follows:  MedX exercise started day 3:  Patient received neuromuscular education for 10 minutes via  participation on the iAdvize Machine. Therapist assisted patient in isolating and engaging spinal stabilization musculature in order to improve functional ability and postural control. Patient performed exercise with therapist guidance in order to accurately use pacer function, avoid valsalva, and optimally exert effort within a safe and effective range via the Dipak Exertion Rating Scale. Patient instructed to perform at a midrange of exertion and to complete 15-20 repetitions within appropriate split time, with proper technique, and while maintaining safety.            3/14/2025     7:02 AM   [facility]   Visit Number 8   VAS Pain Rating 3   Treadmill Time (in min.) 5 min   Lumbar Stretches - Slouch Overcorrection 10   Extension in Standing 10   Flexion in Lying 10   Lumbar Weight 45 lbs   Repetitions 20   Rating of Perceived Exertion 3       therapeutic exercises to develop strength, endurance, ROM, flexibility, posture, and core stabilization for 40 minutes including:    DKTC x 10  LTR x 10  Open books x 10   transverse abdominal  c/ SLR  x 10 + 1#   Bridges c/ GTB x20x  Clamshells c/GTB  x20x  +Cat/COw x 10   bird dog x 10  SOC x 10-NP  EIS x 10    Peripheral muscle strengthening which included 1 set of 15-20 repetitions at a slow, controlled 10-13 second per rep pace focused on strengthening supporting musculature for improved body mechanics and functional mobility.  Pt and therapist focused on proper form during treatment to ensure optimal strengthening of each targeted muscle group.  Machines were utilized including torso rotation, leg press, hip abd and hip add, leg ext.  Leg curl, triceps, biceps, chest and row added visit 3     manual therapy techniques:  were applied to the: low back for 00 minutes, including:      cold pack for 0 minutes to low back.    Home Exercises Provided and Patient Education Provided   Home exercises include: DKTC, PPT, bridges, bernard  Cardio program: visit 5  2/25/25  Lifting education date: visit 11  Posture/Lumbar roll: purchased  Fridge Magnet Discharge handout (date given):  Equipment at home/gym membership: yes OFC      Education provided:   -  cues w/exs  MedX performance  Precor ex performance    Written Home Exercises Provided: Patient instructed to cont prior HEP.  Exercises were reviewed and Katarina was able to demonstrate them prior to the end of the session.  Katarina demonstrated good  understanding of the education provided.     See EMR under Patient Instructions for exercises provided 2/13/2025.    Assessment   Katarina returns reporting minimal lower back discomfort/stiffness. Treatment continued with mobility, strengthening, and neuro re-education exercises.  She was able to perform ex's with min cues and without increased pain. Lumbar MedX ROM resistance was maintained at 45 ft/lbs and she completed 20 reps with RPE = 3/10.  She was also able to complete all of the peripheral strengthening exercises. Will progress treatment per HB protocol.     Patient is making good progress towards established goals.  Pt will continue to benefit from skilled outpatient physical therapy to address the deficits stated in the impairment chart, provide pt/family education and to maximize pt's level of independence in the home and community environment.     Anticipated Barriers for therapy: none  Pt's spiritual, cultural and educational needs considered and pt agreeable to plan of care and goals as stated below:     Goals:   Short term goals:  6 weeks or 10 visits   - Pt will demonstrate increased lumbar MedX ROM by at least 3 degrees from the initial ROM value with improvements noted in functional ROM and ability to perform ADLs. Appropriate and Ongoing  - Pt will demonstrate increased MedX average isometric strength value by 25% from initial test resulting in improved ability to perform bending, lifting, and carrying activities safely, confidently. Appropriate and Ongoing  - Pt  will report a reduction in worst pain score by 1-2 points for improved tolerance for sitting. Appropriate and Ongoing  - Pt able to perform HEP correctly with minimal cueing or supervision from therapist to encourage independent management of symptoms. Appropriate and Ongoing     Long term goals: 10 weeks or 20 visits   - Pt will demonstrate increased lumbar MedX ROM by at least 6 degrees from initial ROM value, resulting in improved ability to perform functional forward bending while standing and sitting. Appropriate and Ongoing  - Pt will demonstrate increased MedX average isometric strength value by 50% from initial test resulting in improved ability to perform bending, lifting, and carrying activities safely and confidently. Appropriate and Ongoing  - Pt to demonstrate ability to independently control and reduce their pain through posture positioning and mechanical movements throughout a typical day. Appropriate and Ongoing  - Pt will demonstrate reduced pain and improved functional outcomes as reported on the Oswestry Disability Index by reaching a score of 16% or less in order to demonstrate subjective improvement in pt's condition. . Appropriate and Ongoing  - Pt will demonstrate independence with the HEP at discharge. Appropriate and Ongoing  - Pt will be able to perform household tasks without increased back symptoms. (patient goal) Appropriate and Ongoing     Plan   Continue with established Plan of Care towards established PT goals.       Therapist: Ese Sheldon, PT  3/14/2025

## 2025-03-18 ENCOUNTER — CLINICAL SUPPORT (OUTPATIENT)
Dept: REHABILITATION | Facility: HOSPITAL | Age: 40
End: 2025-03-18
Payer: COMMERCIAL

## 2025-03-18 DIAGNOSIS — M53.86 DECREASED RANGE OF MOTION OF LUMBAR SPINE: Primary | ICD-10-CM

## 2025-03-18 DIAGNOSIS — R29.898 DECREASED STRENGTH OF TRUNK AND BACK: ICD-10-CM

## 2025-03-18 PROCEDURE — 97112 NEUROMUSCULAR REEDUCATION: CPT

## 2025-03-18 PROCEDURE — 97110 THERAPEUTIC EXERCISES: CPT

## 2025-03-18 NOTE — PROGRESS NOTES
[facility]      Name: Katarina Munguia  Clinic Number: [id number]    Therapy Diagnosis:   Encounter Diagnoses   Name Primary?    Decreased range of motion of lumbar spine Yes    Decreased strength of trunk and back      Physician: Priti Mckoy MD    Visit Date: 3/18/2025    Physician Orders: PT Eval and Treat  Medical Diagnosis from Referral: M54.50,G89.29 (ICD-10-CM) - Chronic bilateral low back pain without sciatica   Evaluation Date: 2/12/2025  Authorization Period Expiration: 12/31/2025  Plan of Care Expiration: 4/25/2025  Reassessment Due: 4/18/2025  Visit # / Visits authorized: 9/20   MedX testing visit 2     Time In: 7:00 AM  Time Out: 7:55 AM  Total Billable Time: 50 minutes  INSURANCE and OUTCOMES: Program Benefit Group with Lumbar Outcomes (Oswestry and AQoL) 1/3    Precautions: Standard    Pattern of pain determined: 1 PEN    Subjective   Katarina reports minimal lower back discomfort currently. States she still has most discomfort in morning time, but it improves once she gets moving     Patient reports tolerating previous visit without adverse effects  Patient reports their pain to be 2/10 on a 0-10 scale with 0 being no pain and 10 being the worst pain imaginable.  Pain Location: low back     Occupation: works from home; HR desk job    Leisure: working out, walking, going out with friends   Pt goals: do household tasks without back pain, work out without pain     Objective     MOVEMENT LOSS - Lumbar    Norms ROM Loss Initial ROM 4/18/25   Flexion Fingers touch toes, sacral angle >/= 70 deg, uniform spinal curvature, posterior weight shift  moderate loss Minimal loss   Extension ASIS surpasses toes, spine of scapulae surpasses heels, uniform spinal curve moderate loss Minimal loss   Side glide Right   minimal loss Within functional limits    Side glide Left   minimal loss Within functional limits    Rotation Right PT observes contralateral shoulder moderate loss Moderate  loss   Rotation Left PT observes contralateral shoulder minimal loss Moderate loss        Lumbar IM Testing Results:   Date of testin25   ROM 0-38 deg   Max Peak Torque 76    Min Peak Torque 16    Flex/Ext Ratio 4.8:1   % below normative data 67%     Oswestry Outcomes:  Initial score:  17/50 = 34% disability   Visit 5 score:  Goal: 16% disability or less    Treatment    Katarina received the treatments listed below:      Katarina received neuromuscular education      Medical MedX Treatment as follows:  MedX exercise started day 3:  Patient received neuromuscular education for 10 minutes via participation on the Medical MedX Machine. Therapist assisted patient in isolating and engaging spinal stabilization musculature in order to improve functional ability and postural control. Patient performed exercise with therapist guidance in order to accurately use pacer function, avoid valsalva, and optimally exert effort within a safe and effective range via the Dipak Exertion Rating Scale. Patient instructed to perform at a midrange of exertion and to complete 15-20 repetitions within appropriate split time, with proper technique, and while maintaining safety.            3/18/2025     7:07 AM   [facility]   Visit Number 9   VAS Pain Rating 2   Treadmill Time (in min.) 5 min   Lumbar Stretches - Slouch Overcorrection 10   Extension in Standing 10   Flexion in Lying 10   Lumbar Weight 50 lbs   Repetitions 18   Rating of Perceived Exertion 4     therapeutic exercises to develop strength, endurance, ROM, flexibility, posture, and core stabilization for 40 minutes including:    DKTC x 10  LTR x 10  Open books x 10  Transverse abdominal c/ SLR + 1# x15  Bridges c/ blue TB x20  Clamshells c/ blue TB x20  Cat/Cow x 10  Bird dog x 10  SOC x 10-NP  EIS x 10    Peripheral muscle strengthening which included 1 set of 15-20 repetitions at a slow, controlled 10-13 second per rep pace focused on strengthening supporting  musculature for improved body mechanics and functional mobility.  Pt and therapist focused on proper form during treatment to ensure optimal strengthening of each targeted muscle group.  Machines were utilized including torso rotation, leg press, hip abd and hip add, leg ext.  Leg curl, triceps, biceps, chest and row added visit 3     manual therapy techniques:  were applied to the: low back for 00 minutes, including:      cold pack for 0 minutes to low back.    Home Exercises Provided and Patient Education Provided   Home exercises include: DKTC, PPT, bridges, clams  Cardio program: visit 5 2/25/25  Lifting education date: visit 11  Posture/Lumbar roll: purchased  Fridge Magnet Discharge handout (date given):  Equipment at home/gym membership: yes OFC      Education provided:   -  cues w/exs  MedX performance  Precor ex performance    Written Home Exercises Provided: Patient instructed to cont prior HEP.  Exercises were reviewed and Katarina was able to demonstrate them prior to the end of the session.  Katarina demonstrated good  understanding of the education provided.     See EMR under Patient Instructions for exercises provided 2/13/2025.    Assessment   Katarina returns with minimal low back discomfort today. Treatment continued with mobility, strengthening, and neuro re-education exercises. Progressed to blue   TB resistance for bridges and clamshells; progressed reps for TrA + SLR. She was able to perform ex's with min cues and without increased pain. Lumbar MedX resistance was increased to 50 ft/lbs and she completed 18 reps with RPE = 4/10. She was also able to complete all of the peripheral strengthening exercises. Will progress treatment per HB protocol.     Patient is making good progress towards established goals.  Pt will continue to benefit from skilled outpatient physical therapy to address the deficits stated in the impairment chart, provide pt/family education and to maximize pt's level of independence in  the home and community environment.     Anticipated Barriers for therapy: none  Pt's spiritual, cultural and educational needs considered and pt agreeable to plan of care and goals as stated below:     Goals:     Short term goals:  6 weeks or 10 visits   - Pt will demonstrate increased lumbar MedX ROM by at least 3 degrees from the initial ROM value with improvements noted in functional ROM and ability to perform ADLs. Appropriate and Ongoing  - Pt will demonstrate increased MedX average isometric strength value by 25% from initial test resulting in improved ability to perform bending, lifting, and carrying activities safely, confidently. Appropriate and Ongoing  - Pt will report a reduction in worst pain score by 1-2 points for improved tolerance for sitting. Appropriate and Ongoing  - Pt able to perform HEP correctly with minimal cueing or supervision from therapist to encourage independent management of symptoms. Appropriate and Ongoing     Long term goals: 10 weeks or 20 visits   - Pt will demonstrate increased lumbar MedX ROM by at least 6 degrees from initial ROM value, resulting in improved ability to perform functional forward bending while standing and sitting. Appropriate and Ongoing  - Pt will demonstrate increased MedX average isometric strength value by 50% from initial test resulting in improved ability to perform bending, lifting, and carrying activities safely and confidently. Appropriate and Ongoing  - Pt to demonstrate ability to independently control and reduce their pain through posture positioning and mechanical movements throughout a typical day. Appropriate and Ongoing  - Pt will demonstrate reduced pain and improved functional outcomes as reported on the Oswestry Disability Index by reaching a score of 16% or less in order to demonstrate subjective improvement in pt's condition. . Appropriate and Ongoing  - Pt will demonstrate independence with the HEP at discharge. Appropriate and Ongoing  -  Pt will be able to perform household tasks without increased back symptoms. (patient goal) Appropriate and Ongoing     Plan   Continue with established Plan of Care towards established PT goals.       Therapist: Olive Kuo, PT  3/18/2025

## 2025-03-19 ENCOUNTER — INFUSION (OUTPATIENT)
Dept: INFUSION THERAPY | Facility: HOSPITAL | Age: 40
End: 2025-03-19
Payer: COMMERCIAL

## 2025-03-19 VITALS — HEART RATE: 60 BPM | DIASTOLIC BLOOD PRESSURE: 74 MMHG | TEMPERATURE: 98 F | SYSTOLIC BLOOD PRESSURE: 144 MMHG

## 2025-03-19 DIAGNOSIS — E61.1 IRON DEFICIENCY: Primary | ICD-10-CM

## 2025-03-19 PROCEDURE — 96374 THER/PROPH/DIAG INJ IV PUSH: CPT

## 2025-03-19 RX ORDER — DIPHENHYDRAMINE HYDROCHLORIDE 50 MG/ML
50 INJECTION, SOLUTION INTRAMUSCULAR; INTRAVENOUS ONCE AS NEEDED
Status: DISCONTINUED | OUTPATIENT
Start: 2025-03-19 | End: 2025-03-19 | Stop reason: HOSPADM

## 2025-03-19 RX ORDER — EPINEPHRINE 0.3 MG/.3ML
0.3 INJECTION SUBCUTANEOUS ONCE AS NEEDED
Status: DISCONTINUED | OUTPATIENT
Start: 2025-03-19 | End: 2025-03-19 | Stop reason: HOSPADM

## 2025-03-19 RX ORDER — SODIUM CHLORIDE 0.9 % (FLUSH) 0.9 %
10 SYRINGE (ML) INJECTION
Status: DISCONTINUED | OUTPATIENT
Start: 2025-03-19 | End: 2025-03-19 | Stop reason: HOSPADM

## 2025-03-19 RX ORDER — HEPARIN 100 UNIT/ML
500 SYRINGE INTRAVENOUS
Status: DISCONTINUED | OUTPATIENT
Start: 2025-03-19 | End: 2025-03-19 | Stop reason: HOSPADM

## 2025-03-25 ENCOUNTER — LAB VISIT (OUTPATIENT)
Dept: LAB | Facility: HOSPITAL | Age: 40
End: 2025-03-25
Attending: INTERNAL MEDICINE
Payer: COMMERCIAL

## 2025-03-25 ENCOUNTER — RESULTS FOLLOW-UP (OUTPATIENT)
Dept: INTERNAL MEDICINE | Facility: CLINIC | Age: 40
End: 2025-03-25

## 2025-03-25 ENCOUNTER — PATIENT MESSAGE (OUTPATIENT)
Dept: OBSTETRICS AND GYNECOLOGY | Facility: CLINIC | Age: 40
End: 2025-03-25
Payer: COMMERCIAL

## 2025-03-25 DIAGNOSIS — Z01.818 PREOP EXAMINATION: ICD-10-CM

## 2025-03-25 LAB
BILIRUB UR QL STRIP.AUTO: NEGATIVE
CLARITY UR: CLEAR
COLOR UR AUTO: COLORLESS
GLUCOSE UR QL STRIP: NEGATIVE
HGB UR QL STRIP: NEGATIVE
KETONES UR QL STRIP: NEGATIVE
LEUKOCYTE ESTERASE UR QL STRIP: NEGATIVE
NITRITE UR QL STRIP: NEGATIVE
PH UR STRIP: 7 [PH]
PROT UR QL STRIP: NEGATIVE
SP GR UR STRIP: <1.005
UROBILINOGEN UR STRIP-ACNC: NEGATIVE EU/DL

## 2025-03-25 PROCEDURE — 81003 URINALYSIS AUTO W/O SCOPE: CPT

## 2025-03-26 ENCOUNTER — PATIENT MESSAGE (OUTPATIENT)
Dept: INTERNAL MEDICINE | Facility: CLINIC | Age: 40
End: 2025-03-26
Payer: COMMERCIAL

## 2025-03-27 ENCOUNTER — CLINICAL SUPPORT (OUTPATIENT)
Dept: REHABILITATION | Facility: HOSPITAL | Age: 40
End: 2025-03-27
Attending: INTERNAL MEDICINE
Payer: COMMERCIAL

## 2025-03-27 DIAGNOSIS — M53.86 DECREASED RANGE OF MOTION OF LUMBAR SPINE: Primary | ICD-10-CM

## 2025-03-27 DIAGNOSIS — R29.898 DECREASED STRENGTH OF TRUNK AND BACK: ICD-10-CM

## 2025-03-27 PROCEDURE — 97750 PHYSICAL PERFORMANCE TEST: CPT | Mod: 32

## 2025-03-27 NOTE — PROGRESS NOTES
[facility]      Name: Katarina Munguia  Clinic Number: [id number]    Therapy Diagnosis:   Encounter Diagnoses   Name Primary?    Decreased range of motion of lumbar spine Yes    Decreased strength of trunk and back        Physician: Priti Mckoy MD    Visit Date: 3/27/2025    Physician Orders: PT Eval and Treat  Medical Diagnosis from Referral: M54.50,G89.29 (ICD-10-CM) - Chronic bilateral low back pain without sciatica   Evaluation Date: 2/12/2025  Authorization Period Expiration: 12/31/2025  Plan of Care Expiration: 4/25/2025  Reassessment Due: 4/18/2025  Visit # / Visits authorized: 10/20   MedX testing visit 2     Time In: 7:00 AM  Time Out: 7:55 AM  Total Billable Time: 50 minutes  INSURANCE and OUTCOMES: Program Benefit Group with Lumbar Outcomes (Oswestry and AQoL) 2/3    Precautions: Standard    Pattern of pain determined: 1 PEN    Subjective   Katarina reports her daily pain has decreased from a daily 7 to a 2 or 3/10.  She expresses concern that she is plateauing with progress.  Pt reports she is having a hysterectomy in April and wants to get as strong as possible.    Patient reports tolerating previous visit without adverse effects  Patient reports their pain to be 2/10 on a 0-10 scale with 0 being no pain and 10 being the worst pain imaginable.  Pain Location: low back     Occupation: works from home; [employer] desk job    Leisure: working out, walking, going out with friends   Pt goals: do household tasks without back pain, work out without pain     Objective     MOVEMENT LOSS - Lumbar    Norms ROM Loss Initial ROM 4/18/25   Flexion Fingers touch toes, sacral angle >/= 70 deg, uniform spinal curvature, posterior weight shift  moderate loss Minimal loss   Extension ASIS surpasses toes, spine of scapulae surpasses heels, uniform spinal curve moderate loss Minimal loss   Side glide Right   minimal loss Within functional limits    Side glide Left   minimal loss Within functional  limits    Rotation Right PT observes contralateral shoulder moderate loss Moderate loss   Rotation Left PT observes contralateral shoulder minimal loss Moderate loss        Lumbar IM Testing Results:   Date of testin/13/25 3/27/25   ROM 0-38 deg 0-48   Max Peak Torque 76  82   Min Peak Torque 16  35   Flex/Ext Ratio 4.8:1 2.3:1   % below normative data 67% 53%      Strength gain                                                   52%  Oswestry Outcomes:  Initial score:  17/50 = 34% disability   Visit 10: 9   score: 18%  Goal: 16% disability or less    Treatment    Katarina received the treatments listed below:      Katarina received neuromuscular education      Medical MedX Treatment as follows:  MedX exercise started day 3:  Patient received neuromuscular education for 10 minutes via participation on the Medical MedX Machine. Therapist assisted patient in isolating and engaging spinal stabilization musculature in order to improve functional ability and postural control. Patient performed exercise with therapist guidance in order to accurately use pacer function, avoid valsalva, and optimally exert effort within a safe and effective range via the Dipak Exertion Rating Scale. Patient instructed to perform at a midrange of exertion and to complete 15-20 repetitions within appropriate split time, with proper technique, and while maintaining safety.            3/27/2025     8:40 AM   [facility]   Visit Number 10   VAS Pain Rating 3   Treadmill Time (in min.) 5 min   Lumbar Stretches - Slouch Overcorrection 10   Extension in Standing 10   Flexion in Lying 10   Lumbar Flexion 48   Lumbar Extension 0   Lumbar Peak Torque 82 ft. lbs.   Min Torque 35   Test Percent Below Normative Data 53 %   Test Percent Gain in Strength from Initial  52 %         therapeutic exercises to develop strength, endurance, ROM, flexibility, posture, and core stabilization for 50 minutes including:  Dying bugs 10x  DKTC x 10  LTR x 10  Open  books x 10  Transverse abdominal c/ SLR + 1# x15  Bridges c/ blue TB x20 c/ ABD  Clamshells c/ blue TB x10 c /plank  Cat/Cow x 10  Bird dog x 10 c/ GTB 3 second hold  Multifidus 15x  SOC x 10-NP  EIS x 10  Paloff press c/ OH lift 10# 10x  Peripheral muscle strengthening which included 1 set of 15-20 repetitions at a slow, controlled 10-13 second per rep pace focused on strengthening supporting musculature for improved body mechanics and functional mobility.  Pt and therapist focused on proper form during treatment to ensure optimal strengthening of each targeted muscle group.  Machines were utilized including torso rotation, leg press, hip abd and hip add, leg ext.  Leg curl, triceps, biceps, chest and row added visit 3     manual therapy techniques:  were applied to the: low back for 00 minutes, including:      cold pack for 0 minutes to low back.    Home Exercises Provided and Patient Education Provided   Home exercises include: DKTC, PPT, bridges, clams  Cardio program: visit 5 2/25/25  Lifting education date: visit 11  Posture/Lumbar roll: purchased  [employer] Discharge handout (date given):  Equipment at home/gym membership: yes OFC      Education provided:   -  cues w/exs  MedX performance  Precor ex performance    Written Home Exercises Provided: Patient instructed to cont prior HEP.  Exercises were reviewed and Katarina was able to demonstrate them prior to the end of the session.  Katarina demonstrated good  understanding of the education provided.     See EMR under Patient Instructions for exercises provided 2/13/2025.    Assessment   Patient has attended 10 visits at [facility] which included MD evaluation, PT evaluation with isometric testing, and physical therapy treatment including HEP instruction, education, aerobic activity, dynamic strengthening on MedX equipment for the spine, and whole body strengthening on MedX equipment with increasing resistance. Patient demonstrates increased ability to  reduce symptoms, improve posture, improve ROM, and improve strength, as stated below:      -Improved 9 ROM, initially on MedX test 0-39 and currently 0-48.  -Improved strength at each test point on lumbar MedX isometric test with 52% average improvement noted with reduced pain noted by patient.  -Initial outcome tool score 34% and current outcome tool score 18% indicating reduced pain and improved function.         Patient is making good progress towards established goals.  Pt will continue to benefit from skilled outpatient physical therapy to address the deficits stated in the impairment chart, provide pt/family education and to maximize pt's level of independence in the home and community environment.     Anticipated Barriers for therapy: none  Pt's spiritual, cultural and educational needs considered and pt agreeable to plan of care and goals as stated below:     Goals:     Short term goals:  6 weeks or 10 visits   - Pt will demonstrate increased lumbar MedX ROM by at least 3 degrees from the initial ROM value with improvements noted in functional ROM and ability to perform ADLs. MET  - Pt will demonstrate increased MedX average isometric strength value by 25% from initial test resulting in improved ability to perform bending, lifting, and carrying activities safely, confidently. MET  - Pt will report a reduction in worst pain score by 1-2 points for improved tolerance for sitting. Appropriate and Ongoing  - Pt able to perform HEP correctly with minimal cueing or supervision from therapist to encourage independent management of symptoms. Appropriate and Ongoing     Long term goals: 10 weeks or 20 visits   - Pt will demonstrate increased lumbar MedX ROM by at least 6 degrees from initial ROM value, resulting in improved ability to perform functional forward bending while standing and sitting. MET  - Pt will demonstrate increased MedX average isometric strength value by 50% from initial test resulting in improved  ability to perform bending, lifting, and carrying activities safely and confidently. MET  - Pt to demonstrate ability to independently control and reduce their pain through posture positioning and mechanical movements throughout a typical day. Appropriate and Ongoing  - Pt will demonstrate reduced pain and improved functional outcomes as reported on the Oswestry Disability Index by reaching a score of 16% or less in order to demonstrate subjective improvement in pt's condition. . Appropriate and Ongoing  - Pt will demonstrate independence with the HEP at discharge. Appropriate and Ongoing  - Pt will be able to perform household tasks without increased back symptoms. (patient goal) Appropriate and Ongoing     Plan   Continue with established Plan of Care towards established PT goals.       Therapist: Ese Sheldon, PT  3/27/2025

## 2025-04-01 ENCOUNTER — CLINICAL SUPPORT (OUTPATIENT)
Dept: REHABILITATION | Facility: HOSPITAL | Age: 40
End: 2025-04-01
Payer: COMMERCIAL

## 2025-04-01 ENCOUNTER — OFFICE VISIT (OUTPATIENT)
Dept: INTERNAL MEDICINE | Facility: CLINIC | Age: 40
End: 2025-04-01
Payer: COMMERCIAL

## 2025-04-01 VITALS
DIASTOLIC BLOOD PRESSURE: 84 MMHG | HEIGHT: 65 IN | BODY MASS INDEX: 29.38 KG/M2 | WEIGHT: 176.38 LBS | OXYGEN SATURATION: 99 % | HEART RATE: 60 BPM | SYSTOLIC BLOOD PRESSURE: 134 MMHG

## 2025-04-01 DIAGNOSIS — Z01.818 PREOP EXAMINATION: Primary | ICD-10-CM

## 2025-04-01 DIAGNOSIS — R29.898 DECREASED STRENGTH OF TRUNK AND BACK: ICD-10-CM

## 2025-04-01 DIAGNOSIS — I10 ESSENTIAL HYPERTENSION: ICD-10-CM

## 2025-04-01 DIAGNOSIS — M53.86 DECREASED RANGE OF MOTION OF LUMBAR SPINE: Primary | ICD-10-CM

## 2025-04-01 DIAGNOSIS — E66.3 OVERWEIGHT (BMI 25.0-29.9): ICD-10-CM

## 2025-04-01 DIAGNOSIS — E78.2 MIXED HYPERLIPIDEMIA: ICD-10-CM

## 2025-04-01 DIAGNOSIS — Z90.3 S/P GASTRIC SLEEVE PROCEDURE: ICD-10-CM

## 2025-04-01 PROCEDURE — 1159F MED LIST DOCD IN RCRD: CPT | Mod: CPTII,S$GLB,, | Performed by: INTERNAL MEDICINE

## 2025-04-01 PROCEDURE — 3008F BODY MASS INDEX DOCD: CPT | Mod: CPTII,S$GLB,, | Performed by: INTERNAL MEDICINE

## 2025-04-01 PROCEDURE — 99999 PR PBB SHADOW E&M-EST. PATIENT-LVL IV: CPT | Mod: PBBFAC,,, | Performed by: INTERNAL MEDICINE

## 2025-04-01 PROCEDURE — 99214 OFFICE O/P EST MOD 30 MIN: CPT | Mod: S$GLB,,, | Performed by: INTERNAL MEDICINE

## 2025-04-01 PROCEDURE — 3075F SYST BP GE 130 - 139MM HG: CPT | Mod: CPTII,S$GLB,, | Performed by: INTERNAL MEDICINE

## 2025-04-01 PROCEDURE — 1160F RVW MEDS BY RX/DR IN RCRD: CPT | Mod: CPTII,S$GLB,, | Performed by: INTERNAL MEDICINE

## 2025-04-01 PROCEDURE — 3079F DIAST BP 80-89 MM HG: CPT | Mod: CPTII,S$GLB,, | Performed by: INTERNAL MEDICINE

## 2025-04-01 PROCEDURE — 97750 PHYSICAL PERFORMANCE TEST: CPT | Mod: 32

## 2025-04-01 NOTE — LETTER
April 1, 2025      [facility]  [address]  [facility] [address]  Phone: [phone]  Fax: [phone]       Patient: Katarina Munguia   YOB: 1985  Date of Visit: 04/01/2025    Dear Dr. Wells,    Ms. Munguia was recently seen and assessed in my clinic for pre-operative assessment for robotic hysterectomy and salpingectomy, which is currently scheduled on 4/23/25.  She is considered a low-risk patient for a intermediate-risk procedure and does not require any additional cardiac testing at this time.    Recent labs have been reviewed and are acceptable.  Her blood pressures on chart review were noted to be elevated; however, at her pre-operative visit with me today, her numbers were in an acceptable range.  Therefore, we are not starting medications at this time, and she will monitor her ambulatory readings and message me in a few weeks with the readings.    Recent EKG (available in Epic) shows sinus bradycardia, considered normal for her as she exercises regularly and has excellent metabolic equivalents.  No additional cardiac testing is needed at this time.    She can follow up with me as needed following surgery.  Should you have any questions regarding her care, please feel free to contact my office at [phone].    Thank you for you assistance with her care.    Priti Galan MD  Section of Internal Medicine/Primary Care

## 2025-04-01 NOTE — PROGRESS NOTES
[facility]      Name: Katarina Munguia  Clinic Number: [id number]    Therapy Diagnosis:   Encounter Diagnoses   Name Primary?    Decreased range of motion of lumbar spine Yes    Decreased strength of trunk and back      Physician: Priti Mckoy MD    Visit Date: 4/1/2025    Physician Orders: PT Eval and Treat  Medical Diagnosis from Referral: M54.50,G89.29 (ICD-10-CM) - Chronic bilateral low back pain without sciatica   Evaluation Date: 2/12/2025  Authorization Period Expiration: 12/31/2025  Plan of Care Expiration: 4/25/2025  Reassessment Due: 4/18/2025  Visit # / Visits authorized: 11/20 (Lifting next visit)  MedX testing visit 2     Time In: 7:05 AM  Time Out: 7:55 AM  Total Billable Time: 50 minutes  INSURANCE and OUTCOMES: Program Benefit Group with Lumbar Outcomes (Oswestry and AQoL) 2/3    Precautions: Standard    Pattern of pain determined: 1 PEN    Subjective   Katarina reports minimal low back discomfort when arriving to session today.     Patient reports tolerating previous visit without adverse effects  Patient reports their pain to be 2/10 on a 0-10 scale with 0 being no pain and 10 being the worst pain imaginable.  Pain Location: low back     Occupation: works from home; HR desk job    Leisure: working out, walking, going out with friends   Pt goals: do household tasks without back pain, work out without pain     Objective     MOVEMENT LOSS - Lumbar    Norms ROM Loss Initial ROM 4/18/25   Flexion Fingers touch toes, sacral angle >/= 70 deg, uniform spinal curvature, posterior weight shift  moderate loss Minimal loss   Extension ASIS surpasses toes, spine of scapulae surpasses heels, uniform spinal curve moderate loss Minimal loss   Side glide Right   minimal loss Within functional limits    Side glide Left   minimal loss Within functional limits    Rotation Right PT observes contralateral shoulder moderate loss Moderate loss   Rotation Left PT observes contralateral  shoulder minimal loss Moderate loss        Lumbar IM Testing Results:   Date of testin/13/25 3/27/25   ROM 0-38 deg 0-48   Max Peak Torque 76  82   Min Peak Torque 16  35   Flex/Ext Ratio 4.8:1 2.3:1   % below normative data 67% 53%      Strength gain                                                   52%  Oswestry Outcomes:  Initial score:  17/50 = 34% disability   Visit 10: 9   score: 18%  Goal: 16% disability or less    Treatment    Katarina received the treatments listed below:      Katarina received neuromuscular education      Medical MedX Treatment as follows:  MedX exercise started day 3:  Patient received neuromuscular education for 10 minutes via participation on the Medical MedX Machine. Therapist assisted patient in isolating and engaging spinal stabilization musculature in order to improve functional ability and postural control. Patient performed exercise with therapist guidance in order to accurately use pacer function, avoid valsalva, and optimally exert effort within a safe and effective range via the Dipak Exertion Rating Scale. Patient instructed to perform at a midrange of exertion and to complete 15-20 repetitions within appropriate split time, with proper technique, and while maintaining safety.            2025     7:06 AM   [facility]   Visit Number 11   VAS Pain Rating 2   Treadmill Time (in min.) 5 min   Lumbar Stretches - Slouch Overcorrection 10   Extension in Standing 10   Flexion in Lying 10   Lumbar Weight 50 lbs   Repetitions 20   Rating of Perceived Exertion 3   Ice - Z Lie (in min.) 0     therapeutic exercises to develop strength, endurance, ROM, flexibility, posture, and core stabilization for 40 minutes including:    Dying bugs 10x  DKTC x 10  LTR x 10  Open books x 10   Transverse abdominal c/ SLR + 2# x15   Bridges c/ blue TB x20 c/ ABD  Clamshells c/ blue TB x10 c /plank  Cat/Cow x 10  Bird dog x 10 c/ blue TB 3 second hold  Multifidus 15x  SOC x 10-NP  EIS x  10  Paloff press c/ OH lift 10# 10x    Peripheral muscle strengthening which included 1 set of 15-20 repetitions at a slow, controlled 10-13 second per rep pace focused on strengthening supporting musculature for improved body mechanics and functional mobility.  Pt and therapist focused on proper form during treatment to ensure optimal strengthening of each targeted muscle group.  Machines were utilized including torso rotation, leg press, hip abd and hip add, leg ext.  Leg curl, triceps, biceps, chest and row added visit 3     manual therapy techniques:  were applied to the: low back for 00 minutes, including:      cold pack for 0 minutes to low back.    Home Exercises Provided and Patient Education Provided   Home exercises include: DKTC, PPT, bridges, clams  Cardio program: visit 5 2/25/25  Lifting education date: visit 11  Posture/Lumbar roll: purchased  [facility] given[facility]  Equipment at home/gym membership: yes OFC      Education provided:   -  cues w/exs  MedX performance  Precor ex performance    Written Home Exercises Provided: Patient instructed to cont prior HEP.  Exercises were reviewed and Katarina was able to demonstrate them prior to the end of the session.  Katarina demonstrated good  understanding of the education provided.     See EMR under Patient Instructions for exercises provided 2/13/2025.    Assessment   Katarina returns with minimal low back discomfort rated 2/10 today. Treatment continued with mobility, strengthening, and neuro re-education exercises. She was progressed with increased resistance for TrA + SLR and bird dog. She was able to perform ex's with min cues and without increased pain. Lumbar MedX resistance was maintained at 50 ft/lbs and she completed 20 reps with RPE = 3/10. She was also able to complete all of the peripheral strengthening exercises. Will progress treatment per HB protocol.     Patient is making good progress towards established goals.  Pt will  continue to benefit from skilled outpatient physical therapy to address the deficits stated in the impairment chart, provide pt/family education and to maximize pt's level of independence in the home and community environment.     Anticipated Barriers for therapy: none  Pt's spiritual, cultural and educational needs considered and pt agreeable to plan of care and goals as stated below:     Goals:     Short term goals:  6 weeks or 10 visits   - Pt will demonstrate increased lumbar MedX ROM by at least 3 degrees from the initial ROM value with improvements noted in functional ROM and ability to perform ADLs. MET  - Pt will demonstrate increased MedX average isometric strength value by 25% from initial test resulting in improved ability to perform bending, lifting, and carrying activities safely, confidently. MET  - Pt will report a reduction in worst pain score by 1-2 points for improved tolerance for sitting. Appropriate and Ongoing  - Pt able to perform HEP correctly with minimal cueing or supervision from therapist to encourage independent management of symptoms. Appropriate and Ongoing     Long term goals: 10 weeks or 20 visits   - Pt will demonstrate increased lumbar MedX ROM by at least 6 degrees from initial ROM value, resulting in improved ability to perform functional forward bending while standing and sitting. MET  - Pt will demonstrate increased MedX average isometric strength value by 50% from initial test resulting in improved ability to perform bending, lifting, and carrying activities safely and confidently. MET  - Pt to demonstrate ability to independently control and reduce their pain through posture positioning and mechanical movements throughout a typical day. Appropriate and Ongoing  - Pt will demonstrate reduced pain and improved functional outcomes as reported on the Oswestry Disability Index by reaching a score of 16% or less in order to demonstrate subjective improvement in pt's condition. .  Appropriate and Ongoing  - Pt will demonstrate independence with the HEP at discharge. Appropriate and Ongoing  - Pt will be able to perform household tasks without increased back symptoms. (patient goal) Appropriate and Ongoing     Plan   Continue with established Plan of Care towards established PT goals.       Therapist: Olive Kuo, PT  4/1/2025

## 2025-04-01 NOTE — PROGRESS NOTES
INTERNAL MEDICINE ESTABLISHED PATIENT VISIT NOTE    Subjective:     Chief Complaint: Pre-op Exam       Patient ID: Katarina Munguia is a 39 y.o. female with HTN, HLD, obesity s/p gastric sleeve 5/2022, fatty liver, GERD, hx latent TB s/p tx for 6 mos, hx malignant melanoma of mid back, R shoulder and then R ankle followed by Dr. Howard, last seen by me 8/2024 for her annual exam, here today for preop visit.    To review, was previously seeing Dr. Mas for issues c menorrhagia.  Was seen by Dr. Wells here for a second opinion and had u/s pelvis done which showed 2 small fibroids.  Because she had failed tx c OCP, IUD, etc, has opted for total laparascopic hysterectomy c B salpingectomy which is scheduled for 4/23/25.    Denies cp or sob.  Exercises 1-2 hours 4-5 days a week (does weights and cardio for about 35 minutes) with no cp or sob.    Had CBC and CMP prior to today's visit which showed resolution of previous anemia and stable CMP.  Had a total of 8 iron transfusions, the last of which was last week.      Past Medical History:  Past Medical History:   Diagnosis Date    Allergy     Dysplastic nevus 06/21/2021    r upper arm    Fatty liver disease, nonalcoholic     Hypertension     Melanoma 2006    back    Melanoma 11/2019    R ankle with Dr. Ferrell with [facility]    Melanoma 11/2020    R shoulder excised at [facility] Medications:  Prior to Admission medications    Medication Sig Start Date End Date Taking? Authorizing Provider   ALPRAZolam (XANAX) 0.25 MG tablet Take 1 tablet (0.25 mg total) by mouth nightly as needed for Anxiety. 8/12/24 9/11/24  Priti Mckoy MD   ferrous gluconate (FERGON) 324 MG tablet Take 1 tablet (324 mg total) by mouth 3 (three) times daily with meals. 8/12/24   Priti Mckoy MD   fluticasone propionate (FLONASE) 50 mcg/actuation nasal spray 1 spray (50 mcg total) by Each Nostril route once daily. 12/9/24   Priti Mckoy MD   LIDOcaine (LIDODERM) 5 % Place 1 patch onto the skin once  "daily. Remove & Discard patch within 12 hours or as directed by MD 12/26/24   Jose Spence MD   methylPREDNISolone (MEDROL DOSEPACK) 4 mg tablet follow package directions 12/26/24   Jose Spence MD   multivitamin capsule Take 1 capsule by mouth once daily.    Provider, Historical   norethindrone (MICRONOR) 0.35 mg tablet Take 1 tablet (0.35 mg total) by mouth once daily. 3/3/25   Shilpa Wells MD   scopolamine (TRANSDERM-SCOP) 1.3-1.5 mg (1 mg over 3 days) Place 1 patch onto the skin every 72 hours. 2/24/25   Pretus-Anabel Molina NP       Allergies:  Review of patient's allergies indicates:  No Known Allergies    Social History:  Social History[1]     Review of Systems   Constitutional:  Negative for chills, fatigue and fever.   Respiratory:  Negative for cough, chest tightness and shortness of breath.    Cardiovascular:  Negative for chest pain.   Gastrointestinal:  Negative for abdominal pain and blood in stool.   Genitourinary:  Negative for dysuria and frequency.         Health Maintenance:     Immunizations:   Influenza 12/2024  TDap 4/2021  Prevnar rec 11/2020, pneumovax rec but not covered by insurance  Shingrix rec at 51  Moderna covid vaccines completed 3/2021, 4/2021, 8/2022, 2/2023, rec updated booster when ready.     Cancer Screening:  PAP: 11/2024 CATINA Wells  Mammogram:  rec at 41  Colonoscopy:  rec at 46      Objective:   /84   Pulse 60   Ht 5' 5" (1.651 m)   Wt 80 kg (176 lb 5.9 oz)   SpO2 99%   BMI 29.35 kg/m²        General: AAO x3, no apparent distress  HEENT: no cervical LAD, no thyroid masses appreciated  CV: RRR, no m/r/g  Pulm: Lungs CTAB, no crackles, no wheezes  Abd: s/NT/ND +BS  Extremities: no c/c/e    Labs:     Lab Results   Component Value Date    WBC 5.67 03/25/2025    HGB 12.9 03/25/2025    HCT 40.4 03/25/2025    MCV 91 03/25/2025     03/25/2025     Sodium   Date Value Ref Range Status   03/25/2025 138 136 - 145 mmol/L Final   08/09/2024 " 139 136 - 145 mmol/L Final     Potassium   Date Value Ref Range Status   03/25/2025 4.1 3.5 - 5.1 mmol/L Final   08/09/2024 4.1 3.5 - 5.1 mmol/L Final     Chloride   Date Value Ref Range Status   03/25/2025 107 95 - 110 mmol/L Final   08/09/2024 106 95 - 110 mmol/L Final     CO2   Date Value Ref Range Status   03/25/2025 20 (L) 23 - 29 mmol/L Final   08/09/2024 20 (L) 23 - 29 mmol/L Final     Glucose   Date Value Ref Range Status   08/09/2024 76 70 - 110 mg/dL Final     BUN   Date Value Ref Range Status   03/25/2025 9 6 - 20 mg/dL Final     Creatinine   Date Value Ref Range Status   03/25/2025 0.6 0.5 - 1.4 mg/dL Final     Calcium   Date Value Ref Range Status   03/25/2025 8.7 8.7 - 10.5 mg/dL Final   08/09/2024 9.1 8.7 - 10.5 mg/dL Final     Total Protein   Date Value Ref Range Status   08/09/2024 6.5 6.0 - 8.4 g/dL Final     Albumin   Date Value Ref Range Status   03/25/2025 4.2 3.5 - 5.2 g/dL Final   08/09/2024 3.9 3.5 - 5.2 g/dL Final     Total Bilirubin   Date Value Ref Range Status   08/09/2024 0.7 0.1 - 1.0 mg/dL Final     Comment:     For infants and newborns, interpretation of results should be based  on gestational age, weight and in agreement with clinical  observations.    Premature Infant recommended reference ranges:  Up to 24 hours.............<8.0 mg/dL  Up to 48 hours............<12.0 mg/dL  3-5 days..................<15.0 mg/dL  6-29 days.................<15.0 mg/dL       Bilirubin Total   Date Value Ref Range Status   03/25/2025 1.1 (H) 0.1 - 1.0 mg/dL Final     Comment:     For infants and newborns, interpretation of results should be based   on gestational age, weight and in agreement with clinical   observations.    Premature Infant recommended reference ranges:   0-24 hours:  <8.0 mg/dL   24-48 hours: <12.0 mg/dL   3-5 days:    <15.0 mg/dL   6-29 days:   <15.0 mg/dL     Alkaline Phosphatase   Date Value Ref Range Status   08/09/2024 31 (L) 55 - 135 U/L Final     ALP   Date Value Ref Range  Status   03/25/2025 33 (L) 40 - 150 unit/L Final     AST   Date Value Ref Range Status   03/25/2025 16 11 - 45 unit/L Final   08/09/2024 15 10 - 40 U/L Final     ALT   Date Value Ref Range Status   03/25/2025 12 10 - 44 unit/L Final   08/09/2024 9 (L) 10 - 44 U/L Final     Anion Gap   Date Value Ref Range Status   03/25/2025 11 8 - 16 mmol/L Final     Comment:     UNABLE TO CALCULATE     eGFR if    Date Value Ref Range Status   03/18/2022 >60 >60 mL/min/1.73 m^2 Final     eGFR if non    Date Value Ref Range Status   03/18/2022 >60 >60 mL/min/1.73 m^2 Final     Comment:     Calculation used to obtain the estimated glomerular filtration  rate (eGFR) is the CKD-EPI equation.        Lab Results   Component Value Date    HGBA1C 5.0 08/09/2024     Lab Results   Component Value Date    LDLCALC 128.2 08/09/2024     Lab Results   Component Value Date    TSH 0.812 08/09/2024         Assessment/Plan     Katarina was seen today for pre-op exam.    Diagnoses and all orders for this visit:    Preop examination  Scheduled for robotic hysterectomy with salpingectomy on 4/23/25.  Overall, patient is a low-risk patient for an intermediate-risk surgery.  Her blood pressures have been diet controlled in the past, but have been trending high over the past year.  However, pressure in office today is in an acceptable range.  She has been advised to monitor ambulatory pressures and will send me updated readings.  In any case, this should not change her preoperative assessment based on her current readings.  Her EKG today shows sinus bradycardia.  She has excellent metabolic equivalents and does not require any additional cardiac testing at this time.   -     IN OFFICE EKG 12-LEAD (to Muse)    Essential hypertension  As above  Will not start meds yet based on today's readings.  Pt to monitor outpatient pressures and send me readings later this month.  If over 140/90 will consider Losartan 25 mg daily.    Mixed  hyperlipidemia  Lab Results   Component Value Date    LDLCALC 128.2 08/09/2024     Diet controlled  Cont lifestyle modifications.    Overweight (BMI 25.0-29.9)  S/P gastric sleeve procedure  No acute issues  Cont diet and regular exercise.      HM as above  RTC in Aug for annual exam, sooner if needed.    Priti Mckoy MD  Department of Internal Medicine - [facility]  04/01/2025         [1]   Social History  Tobacco Use    Smoking status: Never    Smokeless tobacco: Never   Substance Use Topics    Alcohol use: Yes     Alcohol/week: 1.0 standard drink of alcohol     Types: 1 Cans of beer per week     Comment: occasional    Drug use: No

## 2025-04-01 NOTE — PATIENT INSTRUCTIONS
Get a home Blood pressure cuff for your upper arm.  Check BP at home twice a week.  Make sure you have been sitting quietly for at least 20 minutes.  Send Dr. Mckoy the readings in one month.  Goal <140/90.  If the readings are consistently high in the next 2 weeks, you can message Dr. Mckoy sooner.

## 2025-04-03 ENCOUNTER — CLINICAL SUPPORT (OUTPATIENT)
Dept: REHABILITATION | Facility: HOSPITAL | Age: 40
End: 2025-04-03
Payer: COMMERCIAL

## 2025-04-03 DIAGNOSIS — R29.898 DECREASED STRENGTH OF TRUNK AND BACK: ICD-10-CM

## 2025-04-03 DIAGNOSIS — M53.86 DECREASED RANGE OF MOTION OF LUMBAR SPINE: Primary | ICD-10-CM

## 2025-04-03 PROCEDURE — 97750 PHYSICAL PERFORMANCE TEST: CPT | Mod: 32

## 2025-04-03 NOTE — PROGRESS NOTES
[facility]      Name: Katarina Munguia  Clinic Number: [id number]    Therapy Diagnosis:   Encounter Diagnoses   Name Primary?    Decreased range of motion of lumbar spine Yes    Decreased strength of trunk and back        Physician: Priti Mckoy MD    Visit Date: 4/3/2025    Physician Orders: PT Eval and Treat  Medical Diagnosis from Referral: M54.50,G89.29 (ICD-10-CM) - Chronic bilateral low back pain without sciatica   Evaluation Date: 2/12/2025  Authorization Period Expiration: 12/31/2025  Plan of Care Expiration: 4/25/2025  Reassessment Due: 4/18/2025  Visit # / Visits authorized: 12/20   MedX testing visit 2     Time In: 7:00AM  Time Out: 7:45 AM  Total Billable Time: 45 minutes  INSURANCE and OUTCOMES: Program Benefit Group with Lumbar Outcomes (Oswestry and AQoL) 2/3    Precautions: Standard    Pattern of pain determined: 1 PEN    Subjective   Katarina reports increase in LBP with increased resistance activity    Patient reports tolerating previous visit without adverse effects  Patient reports their pain to be 4/10 on a 0-10 scale with 0 being no pain and 10 being the worst pain imaginable.  Pain Location: low back     Occupation: works from home; HR desk job    Leisure: working out, walking, going out with friends   Pt goals: do household tasks without back pain, work out without pain     Objective     MOVEMENT LOSS - Lumbar    Norms ROM Loss Initial ROM 4/18/25   Flexion Fingers touch toes, sacral angle >/= 70 deg, uniform spinal curvature, posterior weight shift  moderate loss Minimal loss   Extension ASIS surpasses toes, spine of scapulae surpasses heels, uniform spinal curve moderate loss Minimal loss   Side glide Right   minimal loss Within functional limits    Side glide Left   minimal loss Within functional limits    Rotation Right PT observes contralateral shoulder moderate loss Moderate loss   Rotation Left PT observes contralateral shoulder minimal loss Moderate  loss        Lumbar IM Testing Results:   Date of testin/13/25 3/27/25   ROM 0-38 deg 0-48   Max Peak Torque 76  82   Min Peak Torque 16  35   Flex/Ext Ratio 4.8:1 2.3:1   % below normative data 67% 53%      Strength gain                                                   52%  Oswestry Outcomes:  Initial score:  17/50 = 34% disability   Visit 10: 9   score: 18%  Goal: 16% disability or less    Treatment    Katarina received the treatments listed below:      Katairna received neuromuscular education      Medical MedX Treatment as follows:  MedX exercise started day 3:  Patient received neuromuscular education for 10 minutes via participation on the Medical MedX Machine. Therapist assisted patient in isolating and engaging spinal stabilization musculature in order to improve functional ability and postural control. Patient performed exercise with therapist guidance in order to accurately use pacer function, avoid valsalva, and optimally exert effort within a safe and effective range via the Dipak Exertion Rating Scale. Patient instructed to perform at a midrange of exertion and to complete 15-20 repetitions within appropriate split time, with proper technique, and while maintaining safety.            4/3/2025     7:25 AM   [facility]   Visit Number 12   VAS Pain Rating 4   Treadmill Time (in min.) 5 min   Lumbar Stretches - Slouch Overcorrection 10   Extension in Standing 10   Flexion in Lying 10   Lumbar Weight 55 lbs   Repetitions 15   Rating of Perceived Exertion 4   Ice - Z Lie (in min.) 5       therapeutic exercises to develop strength, endurance, ROM, flexibility, posture, and core stabilization for 35 minutes including:    Dying bugs 10x  DKTC x 10  LTR x 10  Open books x 10   Transverse abdominal c/ SLR + 2# x15   Bridges c/ blue TB x20 c/ ABD  Clamshells c/ blue TB x10 c /plank  Cat/Cow x 10  Bird dog x 10 c/ blue TB 3 second hold  Multifidus 15x  EIS x 10  Paloff press c/ OH lift 10# 10x    Peripheral  muscle strengthening which included 1 set of 15-20 repetitions at a slow, controlled 10-13 second per rep pace focused on strengthening supporting musculature for improved body mechanics and functional mobility.  Pt and therapist focused on proper form during treatment to ensure optimal strengthening of each targeted muscle group.  Machines were utilized including torso rotation, leg press, hip abd and hip add, leg ext.  Leg curl, triceps, biceps, chest and row added visit 3     manual therapy techniques:  were applied to the: low back for 00 minutes, including:      cold pack for 0 minutes to low back.    Home Exercises Provided and Patient Education Provided   Home exercises include: DKTC, PPT, bridges, clams  Cardio program: visit 5 2/25/25  Lifting education date: visit 11 4/3/25  Posture/Lumbar roll: purchased  [employer] Discharge handout (date given):  Equipment at home/gym membership: yes OFC      Education provided:   -  cues w/exs  MedX performance  Precor ex performance    Written Home Exercises Provided: Patient instructed to cont prior HEP.  Exercises were reviewed and Katarina was able to demonstrate them prior to the end of the session.  Katarina demonstrated good  understanding of the education provided.     See EMR under Patient Instructions for exercises provided 2/13/2025.    Assessment   Katarina returns with minimal low back discomfort rated 4/10 today. Pt reports pain has been more consistent since we increased challenge of mat activities.Treatment continued with mobility, strengthening, and neuro re-education exercises. Discussed normal soreness with new activities. She was able to perform ex's with min cues and without increased pain. Lumbar MedX resistance was increased to 55ft/lbs and she completed 15reps with RPE = 4/10.  She c/o some soreness going into full extension during the exercise, but had no pain with ROM assessment following exercise, continue to reassess.She was also able to complete  all of the peripheral strengthening exercises. Will progress treatment per HB protocol.     Patient is making good progress towards established goals.  Pt will continue to benefit from skilled outpatient physical therapy to address the deficits stated in the impairment chart, provide pt/family education and to maximize pt's level of independence in the home and community environment.     Anticipated Barriers for therapy: none  Pt's spiritual, cultural and educational needs considered and pt agreeable to plan of care and goals as stated below:     Goals:     Short term goals:  6 weeks or 10 visits   - Pt will demonstrate increased lumbar MedX ROM by at least 3 degrees from the initial ROM value with improvements noted in functional ROM and ability to perform ADLs. MET  - Pt will demonstrate increased MedX average isometric strength value by 25% from initial test resulting in improved ability to perform bending, lifting, and carrying activities safely, confidently. MET  - Pt will report a reduction in worst pain score by 1-2 points for improved tolerance for sitting. Appropriate and Ongoing  - Pt able to perform HEP correctly with minimal cueing or supervision from therapist to encourage independent management of symptoms. Appropriate and Ongoing     Long term goals: 10 weeks or 20 visits   - Pt will demonstrate increased lumbar MedX ROM by at least 6 degrees from initial ROM value, resulting in improved ability to perform functional forward bending while standing and sitting. MET  - Pt will demonstrate increased MedX average isometric strength value by 50% from initial test resulting in improved ability to perform bending, lifting, and carrying activities safely and confidently. MET  - Pt to demonstrate ability to independently control and reduce their pain through posture positioning and mechanical movements throughout a typical day. Appropriate and Ongoing  - Pt will demonstrate reduced pain and improved functional  outcomes as reported on the Oswestry Disability Index by reaching a score of 16% or less in order to demonstrate subjective improvement in pt's condition. . Appropriate and Ongoing  - Pt will demonstrate independence with the HEP at discharge. Appropriate and Ongoing  - Pt will be able to perform household tasks without increased back symptoms. (patient goal) Appropriate and Ongoing     Plan   Continue with established Plan of Care towards established PT goals.       Therapist: Ese Sheldon, PT  4/3/2025

## 2025-04-10 ENCOUNTER — PATIENT MESSAGE (OUTPATIENT)
Dept: INTERNAL MEDICINE | Facility: CLINIC | Age: 40
End: 2025-04-10
Payer: COMMERCIAL

## 2025-04-11 ENCOUNTER — RESULTS FOLLOW-UP (OUTPATIENT)
Dept: PAIN MEDICINE | Facility: CLINIC | Age: 40
End: 2025-04-11

## 2025-04-11 ENCOUNTER — HOSPITAL ENCOUNTER (OUTPATIENT)
Dept: RADIOLOGY | Facility: HOSPITAL | Age: 40
Discharge: HOME | End: 2025-04-11
Attending: STUDENT IN AN ORGANIZED HEALTH CARE EDUCATION/TRAINING PROGRAM
Payer: COMMERCIAL

## 2025-04-11 ENCOUNTER — OFFICE VISIT (OUTPATIENT)
Dept: PAIN MEDICINE | Facility: CLINIC | Age: 40
End: 2025-04-11
Payer: COMMERCIAL

## 2025-04-11 VITALS
DIASTOLIC BLOOD PRESSURE: 103 MMHG | SYSTOLIC BLOOD PRESSURE: 178 MMHG | BODY MASS INDEX: 28.07 KG/M2 | HEART RATE: 114 BPM | WEIGHT: 168.63 LBS

## 2025-04-11 DIAGNOSIS — M62.838 NECK MUSCLE SPASM: ICD-10-CM

## 2025-04-11 DIAGNOSIS — M54.2 ACUTE NECK PAIN: Primary | ICD-10-CM

## 2025-04-11 DIAGNOSIS — M54.2 ACUTE NECK PAIN: ICD-10-CM

## 2025-04-11 PROCEDURE — 72050 X-RAY EXAM NECK SPINE 4/5VWS: CPT | Mod: TC

## 2025-04-11 PROCEDURE — 99999 PR PBB SHADOW E&M-EST. PATIENT-LVL III: CPT | Mod: PBBFAC,,, | Performed by: STUDENT IN AN ORGANIZED HEALTH CARE EDUCATION/TRAINING PROGRAM

## 2025-04-11 PROCEDURE — 72050 X-RAY EXAM NECK SPINE 4/5VWS: CPT | Mod: 26,,, | Performed by: RADIOLOGY

## 2025-04-11 RX ORDER — CYCLOBENZAPRINE HCL 10 MG
10 TABLET ORAL 3 TIMES DAILY PRN
Qty: 90 TABLET | Refills: 0 | Status: SHIPPED | OUTPATIENT
Start: 2025-04-11 | End: 2025-05-11

## 2025-04-11 RX ORDER — CELECOXIB 200 MG/1
200 CAPSULE ORAL 2 TIMES DAILY
Qty: 60 CAPSULE | Refills: 0 | Status: SHIPPED | OUTPATIENT
Start: 2025-04-11 | End: 2025-05-11

## 2025-04-11 NOTE — PROGRESS NOTES
[facility] - New Patient Evaluation    Referred by: No ref. provider found   Reason for referral: Acute neck pain     CC:   Chief Complaint   Patient presents with    Neck Pain         4/11/2025     8:25 AM   Last 3 PDI Scores   Pain Disability Index (PDI) 63       Subjective 04/11/2025:    Katarina Munguia is a 39 y.o. female who presents complaining of 4 days of acute left sided neck pain.  Pain is located to the left lateral neck and left upper trapezius muscle.  Denies any radiation into the upper extremity on the left.  No history of spine or shoulder surgeries.  Denies any inciting incident or trauma but does note she is very active attending physical therapy for her low back and working out in the gym independently several times weekly.  States she had most recently been doing shoulder and arm exercises in the gym.  Pain is worse with extension and lateral rotation of the C-spine.  She has tried Tylenol, over-the-counter NSAID, and tizanidine with no relief.  She states she has an upcoming robotic hysterectomy scheduled on 04/23.    Initial Pain Assessment:  Location: Left Neck   Onset: 4 days; no inciting incident   Current Pain Score: 9/10  Daily Pain of Range: 8-9/10  Quality: Aching, Dull, Sharp, and Shooting  Radiation: left trapezius   Worsened by: nothing in particular  Improved by: heat, ice, and medications     Patient denies urinary incontinence, bowel incontinence, significant motor weakness, and loss of sensations.    Previous Interventions:  - none    Previous Therapies:  PT/OT: yes - currently on PT for her low back, no PT for the neck  Chiropractor: Yes x chiropractor 1 visit, dry needling, cupping performed  HEP:   Relevant Surgery: no     Previous Medications:   - Tylenol or NSAIDS: Tylenol and OTC NSAID  - Muscle Relaxants: Tizandine   - TCAs:   - SNRIs:   - Topicals:   - Anticonvulsants:    - Opioids:   - Adjuvants:     Current Pain  Medications:  Tylenol  Over-the-counter NSAID  Tizanidine    Anticoagulation:  None    Review of Systems:  ROS    GENERAL:  No weight loss, malaise or fevers. NO  HEENT:   No recent changes in vision or hearing NO  NECK:  No difficulty with swallowing. No stridor. NO  RESPIRATORY:  Negative for cough, wheezing or shortness of breath, patient denies any recent URI. YES .SHORTNESS OF BREATH   CARDIOVASCULAR:  Negative for chest pain, leg swelling or palpitations. YES. CHEST PAIN   GI:  Negative for abdominal discomfort, blood in stools or black stools or change in bowel habits. NO  MUSCULOSKELETAL:  See HPI.  SKIN:  Negative for lesions, rash, and itching. NO   PSYCH:  No mood disorder or recent psychosocial stressors. YES. WORK STRESS   HEMATOLOGY/LYMPHOLOGY:  Negative for prolonged bleeding, bruising easily or swollen nodes.  Patient is not currently taking any anti-coagulants NO   NEURO:   No history of headaches, syncope, paralysis, seizures or tremors. NO   All other reviewed and negative other than HPI.    History:  Current medications, allergies, medical history, surgical history,   family history, and social history were reviewed in the chart as marked.    Full Medication List:  Current Medications[1]     Allergies:  Patient has no known allergies.     Medical History:   has a past medical history of Allergy, Dysplastic nevus (06/21/2021), Fatty liver disease, nonalcoholic, Hypertension, Melanoma (2006), Melanoma (11/2019), and Melanoma (11/2020).    Surgical History:   has a past surgical history that includes Tonsillectomy; Foot surgery; [place] lymph node biopsy (Right, 11/25/2019); Injection for sentinel node identification (Right, 11/25/2019); Esophagogastroduodenoscopy (N/A, 11/29/2019); Augmentation of breast; Foot surgery (Right, 2006); and Gastric sleeve (2022).    Family History:  family history includes Cancer in her father; Heart disease in her maternal grandfather and sister; Hyperlipidemia in  her mother; Hypertension in her mother; Lung cancer in her father; Lymphoma in her mother; Transient ischemic attack in her mother.    Social History:   reports that she has never smoked. She has never used smokeless tobacco. She reports current alcohol use of about 1.0 standard drink of alcohol per week. She reports that she does not use drugs.    Physical Exam:  Vitals:    04/11/25 0826   BP: (!) 178/103   Pulse: (!) 114   Weight: 76.5 kg (168 lb 10.4 oz)   PainSc:   9   PainLoc: Neck       GENERAL: Well appearing, in no acute distress, alert and oriented x3.  PSYCH:  Mood and affect appropriate.  SKIN: Skin color, texture, turgor normal, no rashes or lesions.  HEAD/FACE:  Normocephalic, atraumatic. Cranial nerves grossly intact.  NECK:  Decreased range of motion of the cervical spine in all planes, secondary to pain.  +pain to palpation over the cervical paraspinous muscles and upper trapezius on the left. Spurling Negative.   CV: RRR with palpation of the radial artery.  PULM: No evidence of respiratory difficulty, symmetric chest rise.  GI:  Soft and non-distended.  MSK:  Peripheral joint ROM is full and pain free without obvious instability or laxity in all four extremities. No obvious deformities, edema, or skin discoloration.  No atrophy or tone abnormalities are noted.   NEURO: Bilateral upper and lower extremity coordination and strength is symmetric.  No loss of sensation is noted. No clonus. Woods negative.  MENTAL STATUS: A x O x 3, good concentration, speech is fluent and goal directed  MOTOR: 5/5 in bilateral upper extremity muscle groups  GAIT: Normal. Ambulates unassisted.    Imaging:  No pertinent imaging available.    Labs:  BMP  Lab Results   Component Value Date     03/25/2025    K 4.1 03/25/2025     03/25/2025    CO2 20 (L) 03/25/2025    BUN 9 03/25/2025    CREATININE 0.6 03/25/2025    CALCIUM 8.7 03/25/2025    ANIONGAP 11 03/25/2025    EGFRNORACEVR >60 03/25/2025     Lab Results    Component Value Date    ALT 12 03/25/2025    AST 16 03/25/2025    ALKPHOS 33 (L) 03/25/2025    BILITOT 1.1 (H) 03/25/2025     Lab Results   Component Value Date    WBC 5.67 03/25/2025    HGB 12.9 03/25/2025    HCT 40.4 03/25/2025    MCV 91 03/25/2025     03/25/2025           Assessment:  Problem List Items Addressed This Visit    None  Visit Diagnoses         Acute neck pain    -  Primary    Relevant Orders    Ambulatory Referral/Consult to Physical Therapy    X-Ray Cervical Spine Complete 5 view (Completed)      Neck muscle spasm        Relevant Orders    Ambulatory Referral/Consult to Physical Therapy            04/11/2025 - Katarina Munguia is a 39 y.o. female who  has a past medical history of Allergy, Dysplastic nevus (06/21/2021), Fatty liver disease, nonalcoholic, Hypertension, Melanoma (2006), Melanoma (11/2019), and Melanoma (11/2020).  By history and examination this patient has acute left-sided neck pain x 4 days.  The underlying cause is most likely muscle strain or sprain.  I will get an x-ray to rule out any cervical spine injury.  Recommending PT, lidocaine patches, Flexeril, Celebrex, and Tylenol.  She may benefit from trigger point injections versus Medrol Dosepak, however the patient has an upcoming surgery, so I will defer steroids for now.  The risks and benefits of each treatment option were discussed and all questions were answered.      Treatment Plan:   Procedures:  May benefit from trigger point injections if pain persists.  Avoid steroids as she has surgery in the next week.  PT/OT/HEP: I have stressed the importance of physical activity and a home exercise plan to help with pain and improve health.  Referral placed to PT for cervical spine.  Medications:    - lidocaine patches to the area, she has some at home.   - Celebrex 200 mg b.i.d.   - Tylenol PRN   -  Reviewed and consistent with medication use as prescribed.  Imaging:  Cervical spine x-ray ordered.   Follow Up: 4-6  weeks to assess her response to PT and medications     Suzie España DO   Interventional Pain Medicine / Anesthesiology    Disclaimer: This note was partly generated using dictation software which may occasionally result in transcription errors.         [1]   Current Outpatient Medications:     ferrous gluconate (FERGON) 324 MG tablet, Take 1 tablet (324 mg total) by mouth 3 (three) times daily with meals., Disp: 90 tablet, Rfl: 3    fluticasone propionate (FLONASE) 50 mcg/actuation nasal spray, 1 spray (50 mcg total) by Each Nostril route once daily., Disp: 48 mL, Rfl: 6    LIDOcaine (LIDODERM) 5 %, Place 1 patch onto the skin once daily. Remove & Discard patch within 12 hours or as directed by MD, Disp: 30 patch, Rfl: 0    multivitamin capsule, Take 1 capsule by mouth once daily., Disp: , Rfl:     norethindrone (MICRONOR) 0.35 mg tablet, Take 1 tablet (0.35 mg total) by mouth once daily., Disp: 84 tablet, Rfl: 3    ALPRAZolam (XANAX) 0.25 MG tablet, Take 1 tablet (0.25 mg total) by mouth nightly as needed for Anxiety., Disp: 30 tablet, Rfl: 0    celecoxib (CELEBREX) 200 MG capsule, Take 1 capsule (200 mg total) by mouth 2 (two) times daily., Disp: 60 capsule, Rfl: 0    cyclobenzaprine (FLEXERIL) 10 MG tablet, Take 1 tablet (10 mg total) by mouth 3 (three) times daily as needed for Muscle spasms., Disp: 90 tablet, Rfl: 0

## 2025-04-13 ENCOUNTER — ANESTHESIA EVENT (OUTPATIENT)
Dept: SURGERY | Facility: OTHER | Age: 40
End: 2025-04-13
Payer: COMMERCIAL

## 2025-04-14 ENCOUNTER — HOSPITAL ENCOUNTER (OUTPATIENT)
Dept: PREADMISSION TESTING | Facility: OTHER | Age: 40
Discharge: HOME | End: 2025-04-14
Attending: OBSTETRICS & GYNECOLOGY
Payer: COMMERCIAL

## 2025-04-14 ENCOUNTER — OFFICE VISIT (OUTPATIENT)
Dept: OBSTETRICS AND GYNECOLOGY | Facility: CLINIC | Age: 40
End: 2025-04-14
Payer: COMMERCIAL

## 2025-04-14 VITALS
WEIGHT: 167 LBS | DIASTOLIC BLOOD PRESSURE: 80 MMHG | TEMPERATURE: 98 F | SYSTOLIC BLOOD PRESSURE: 134 MMHG | BODY MASS INDEX: 27.82 KG/M2 | HEART RATE: 82 BPM | RESPIRATION RATE: 14 BRPM | OXYGEN SATURATION: 100 % | HEIGHT: 65 IN

## 2025-04-14 VITALS
SYSTOLIC BLOOD PRESSURE: 116 MMHG | DIASTOLIC BLOOD PRESSURE: 80 MMHG | WEIGHT: 175.38 LBS | BODY MASS INDEX: 29.18 KG/M2

## 2025-04-14 DIAGNOSIS — N93.9 ABNORMAL UTERINE BLEEDING (AUB): Primary | ICD-10-CM

## 2025-04-14 DIAGNOSIS — Z01.818 PREOP TESTING: Primary | ICD-10-CM

## 2025-04-14 LAB
ABORH RETYPE: NORMAL
INDIRECT COOMBS: NORMAL
RH BLD: NORMAL
SPECIMEN OUTDATE: NORMAL

## 2025-04-14 PROCEDURE — 58100 BIOPSY OF UTERUS LINING: CPT | Mod: S$GLB,,, | Performed by: OBSTETRICS & GYNECOLOGY

## 2025-04-14 PROCEDURE — 99499 UNLISTED E&M SERVICE: CPT | Mod: S$GLB,,, | Performed by: OBSTETRICS & GYNECOLOGY

## 2025-04-14 PROCEDURE — 99999 PR PBB SHADOW E&M-EST. PATIENT-LVL III: CPT | Mod: PBBFAC,,, | Performed by: OBSTETRICS & GYNECOLOGY

## 2025-04-14 PROCEDURE — 86900 BLOOD TYPING SEROLOGIC ABO: CPT | Performed by: ANESTHESIOLOGY

## 2025-04-14 PROCEDURE — 88305 TISSUE EXAM BY PATHOLOGIST: CPT | Mod: TC | Performed by: OBSTETRICS & GYNECOLOGY

## 2025-04-14 RX ORDER — SODIUM CHLORIDE, SODIUM LACTATE, POTASSIUM CHLORIDE, CALCIUM CHLORIDE 600; 310; 30; 20 MG/100ML; MG/100ML; MG/100ML; MG/100ML
INJECTION, SOLUTION INTRAVENOUS CONTINUOUS
OUTPATIENT
Start: 2025-04-14

## 2025-04-14 RX ORDER — PROCHLORPERAZINE EDISYLATE 5 MG/ML
5 INJECTION INTRAMUSCULAR; INTRAVENOUS EVERY 10 MIN PRN
OUTPATIENT
Start: 2025-04-14

## 2025-04-14 RX ORDER — ACETAMINOPHEN 500 MG
1000 TABLET ORAL
OUTPATIENT
Start: 2025-04-14 | End: 2025-04-14

## 2025-04-14 RX ORDER — HYDROCODONE BITARTRATE AND ACETAMINOPHEN 5; 325 MG/1; MG/1
1 TABLET ORAL EVERY 4 HOURS PRN
Refills: 0 | OUTPATIENT
Start: 2025-04-14

## 2025-04-14 RX ORDER — SODIUM CHLORIDE 0.9 % (FLUSH) 0.9 %
3 SYRINGE (ML) INJECTION
OUTPATIENT
Start: 2025-04-14

## 2025-04-14 RX ORDER — LIDOCAINE HYDROCHLORIDE 10 MG/ML
0.5 INJECTION, SOLUTION EPIDURAL; INFILTRATION; INTRACAUDAL; PERINEURAL ONCE
OUTPATIENT
Start: 2025-04-14 | End: 2025-04-14

## 2025-04-14 RX ORDER — TRIAMCINOLONE ACETONIDE 1 MG/G
OINTMENT TOPICAL
COMMUNITY
Start: 2025-01-30

## 2025-04-14 RX ORDER — SCOPOLAMINE 1 MG/3D
1 PATCH, EXTENDED RELEASE TRANSDERMAL
OUTPATIENT
Start: 2025-04-14

## 2025-04-14 NOTE — ANESTHESIA PREPROCEDURE EVALUATION
04/13/2025  Katarina Munguia is a 39 y.o., female.      Pre-op Assessment    I have reviewed the Patient Summary Reports.     I have reviewed the Nursing Notes. I have reviewed the NPO Status.   I have reviewed the Medications.     Review of Systems  Anesthesia Hx:  No previous Anesthesia             Denies Family Hx of Anesthesia complications.   Personal Hx of Anesthesia complications, Post-Operative Nausea/Vomiting (scop patch has helped)                    Social:  Non-Smoker       Hematology/Oncology:  Hematology Normal                       --  Cancer in past history (Melinoma):                     EENT/Dental:  chronic allergic rhinitis           Cardiovascular:     Hypertension (improved after gastric sleeve, no longer on medication)           hyperlipidemia                               Pulmonary:  Pulmonary Normal                       Renal/:  Renal/ Normal                 Hepatic/GI:     GERD, well controlled Liver Disease, (fatty liver)  H/o gastric sleeve, 2022             Musculoskeletal:         Spine Disorders: cervical Chronic Pain and Degenerative disease           Neurological:  Neurology Normal                                      Endocrine:  Endocrine Normal            Dermatological:  Skin Normal    Psych:  Psychiatric Normal                  Physical Exam  General: Well nourished, Cooperative, Alert and Oriented    Airway:  Mallampati: II   Mouth Opening: Normal  TM Distance: Normal  Tongue: Normal  Neck ROM: Normal ROM    Dental:  Intact, Caps / Implants      Anesthesia Plan  Type of Anesthesia, risks & benefits discussed:    Anesthesia Type: Gen ETT  Intra-op Monitoring Plan: Standard ASA Monitors  Post Op Pain Control Plan: multimodal analgesia  Induction:  IV  Airway Plan: Video  Informed Consent: Informed consent signed with the Patient and all parties understand the  risks and agree with anesthesia plan.  All questions answered.   ASA Score: 2  Anesthesia Plan Notes: T&S  PCP note, CBC, CMP 03/2025 and EKG 04/2025 in EPIC      Ready For Surgery From Anesthesia Perspective.     .

## 2025-04-14 NOTE — DISCHARGE INSTRUCTIONS
Information to Prepare you for your Surgery    PRE-ADMIT TESTING   [address]  [place] [address]  [address]   [phone]  - AGUEDA Sánchez    Your surgery has been scheduled at [facility]. We are pleased to have the opportunity to serve you. For Further Information please call [phone].    On the day of surgery please report to Registration on the 1st floor of the [facility].    CONTACT YOUR PHYSICIAN'S OFFICE THE DAY PRIOR TO YOUR SURGERY TO OBTAIN YOUR ARRIVAL TIME.     The evening before surgery do not eat anything after 9 p.m. ( this includes hard candy, chewing gum and mints).  You may only have GATORADE, POWERADE AND WATER  from 9 p.m. until you leave your home.     DRINK AT LEAST 12 OUNCES THE MORNING OF SURGERY    DO NOT DRINK ANY LIQUIDS ON THE WAY TO THE HOSPITAL.      Why does your anesthesiologist allow you to drink Gatorade/Powerade before surgery?    Gatorade/Powerade helps to increase your comfort before surgery and to decrease your nausea after surgery.  The carbohydrates in the Gatorade/Powerade help reduce your body's stress response to surgery.  If you are diabetic, drink only water prior to surgery.       Outpatient Surgery- May allow 2 adults (18 and older)/ Support Persons (1 being the designated ) for all surgical/procedural patients. A breastfeeding mother will be allowed her infant and 2 adult Support Persons. No one under the age of 18 will be allowed in the building.    MEDICATION INSTRUCTIONS: TAKE medications checked off by the Anesthesiologist on your Medication List.      Angiogram Patients: Take medications as instructed by your physician, including aspirin.     Surgery Patients:  If you take ASPIRIN - Your PHYSICIAN/SURGEON will need to inform you IF/OR when you need to stop taking aspirin prior to your surgery.     Starting the week prior to surgery, do not take any medications containing IBUPROFEN or NSAIDS (Advil,  Aleve, BC, Celebrex, Goody's, Ketorolac, Meloxicam, Mobic, Motrin, Naproxen, Toradol, etc).  If you are not sure if you should take a medicine please call your surgeon's office.  You may take Tylenol.    Do Not Wear any make-up (especially eye make-up) to surgery. Please remove any false eyelashes or eyelash extensions. If you arrive the day of surgery with makeup/eyelashes on you will be required to remove prior to surgery. (There is a risk of corneal abrasions if eye makeup/eyelash extensions are not removed)    Leave all valuables at home.   Do Not wear any jewelry or watches, including any metal in body piercings. Jewelry must be removed prior to coming to the hospital.  There is a possibility that rings that are unable to be removed may be cut off if they are on the surgical extremity.    Please remove all hair extensions, wigs, clips and any other metal accessories/ ornaments from your hair.  These items may pose a flammable/fire risk in Surgery and must be removed.    Do not shave your surgical area at least 5 days prior to your surgery. The surgical prep will be performed at the hospital according to Infection Control regulations.    Contact Lens must be removed before surgery. Either do not wear the contact lens or bring a case and solution for storage.  Please bring a container for eyeglasses or dentures as required.  Bring any paperwork your physician has provided, such as consent forms,  history and physicals, doctor's orders, etc.   Bring comfortable clothes that are loose fitting to wear upon discharge. Take into consideration the type of surgery being performed.  Maintain your diet as advised per your physician the day prior to surgery.    Adequate rest the night before surgery is advised.   Park in the Parking lot behind the hospital or in the [facility] across the street from the parking lot. Parking is complimentary.  If you will be discharged the same day as your procedure, please  arrange for a responsible adult to drive you home or to accompany you if traveling by taxi.   YOU WILL NOT BE PERMITTED TO DRIVE OR TO LEAVE THE HOSPITAL ALONE AFTER SURGERY.   If you are being discharged the same day, it is strongly recommended that you arrange for someone to remain with you for the first 24 hrs following your surgery.    The Surgeon will speak to your family/visitor after your surgery regarding the outcome of your surgery and post op care.  The Surgeon may speak to you after your surgery, but there is a possibility you may not remember the details.  Please check with your family members regarding the conversation with the Surgeon.         Bathing Instructions with Hibiclens:    Shower the evening before and morning of your procedure with Chlorhexidine (Hibiclens)  do not use Chlorhexidine on your face or genitals. Do not get in your eyes.  Wash your face with water and your regular face wash/soap  Use your regular shampoo  Apply Chlorhexidine (Hibiclens) directly on your skin or on a wet washcloth and wash gently. When showering: Move away from the shower stream when applying Chlorhexidine (Hibiclens) to avoid rinsing off too soon.  Rinse thoroughly with warm water  Do not dilute Chlorhexidine (Hibiclens)   Dry off as usual, do not use any deodorant, powder, body lotions, perfume, after shave or cologne.     We strongly recommend whoever is bringing you home be present for discharge instructions.  This will ensure a thorough understanding for your post op home care.    If the patient has fever, cough, or signs/symptoms of Flu or Covid please do not come in for your surgery.  First, contact the pre op department at [phone] (unit opens at 5AM) and then contact your surgeon and your primary care physician for further instructions.      If applicable, please bring any blood pressure and/or diabetes medications with you the day of surgery.  If on home oxygen, even at night, please bring your  portable oxygen tank with you the day of surgery in case it is needed for your discharge.      Thanks,  AGUEDA Sánchez

## 2025-04-15 NOTE — PROGRESS NOTES
Patient ID: Katarina Munguia is a 39 y.o. female.    Chief Complaint:  Pre-op Exam (- hysterotomy /- date - 4/23/2025 /)      Patient Active Problem List  Diagnosis    Essential hypertension    Hyperlipidemia    Nasal polyp    History of melanoma    Overweight (BMI 25.0-29.9)    Positive QuantiFERON-TB Gold test    Gastroesophageal reflux disease without esophagitis    Fatty liver    Abnormal granulation tissue    Iron deficiency    S/P gastric sleeve procedure    Decreased range of motion of lumbar spine    Decreased strength of trunk and back       History of Present Illness    Here for preop visit.     She saw Dr. Mas in the past who tried IUD which initially worked but had persistent nausea, Lysteda did not work at all and OCP trials last recommended but declined by patient due to prior side effects of nausea and mood changes when used in the past. She is open to surgical options. Cycles currently are regular lasting 9-199 days and progressively become heavier and longer over past 4 years. Using pad and super tampon for 2-3 days of each cycle having to often change due to heavy bleeding every 1-2 hours. Weakness and dizziness at times. No prior blood transfusion. Been on iron supplements in the past.     Ms. Munguia is currently sexually active with a single male partner. She declines STD screening today. Patient has regular monthly menses. Patient's last menstrual period was 10/29/2024 (exact date). She is currently using partner vasectomy for contraception. Menopausal complaints: none       Past Medical History:   Diagnosis Date    Allergy     Dysplastic nevus 06/21/2021    r upper arm    Fatty liver disease, nonalcoholic     Hypertension     Melanoma 2006    back    Melanoma 11/2019    R ankle with Dr. Ferrell with [facility]    Melanoma 11/2020    R shoulder excised at [facility]    PONV (postoperative nausea and vomiting)        Past Surgical History:   Procedure Laterality Date    AUGMENTATION OF  BREAST      ESOPHAGOGASTRODUODENOSCOPY N/A 2019    Procedure: ESOPHAGOGASTRODUODENOSCOPY (EGD);  Surgeon: Katty Garcia MD;  Location: [facility];  Service: Endoscopy;  Laterality: N/A;    FOOT SURGERY      FOOT SURGERY Right 2006    removal of glass    Gastric sleeve      INJECTION FOR SENTINEL NODE IDENTIFICATION Right 2019    Procedure: INJECTION, FOR SENTINEL NODE IDENTIFICATION-Right groin;  Surgeon: Cj Ferrell MD;  Location: [facility] OR [address] [place] [address];  Service: General;  Laterality: Right;    SENTINEL LYMPH NODE BIOPSY Right 2019    Procedure: BIOPSY, LYMPH NODE, SENTINEL-Right groin;  Surgeon: Cj Ferrell MD;  Location: [facility] OR [address] [place] [address];  Service: General;  Laterality: Right;    TONSILLECTOMY         OB History    Para Term  AB Living   1 1 1   1   SAB IAB Ectopic Multiple Live Births       1      # Outcome Date GA Lbr Jose A/2nd Weight Sex Type Anes PTL Lv   1 Term  40w0d  3.6 kg (7 lb 15 oz) M Vag-Spont  N CHUCK     GYN History  Age of Menarche:11  Age at first pregnancy:    Age at first live birth:   Number of months breastfeeding:    Age at Menopause:    Comments: No abnormal pap or STDs     Patient's last menstrual period was 2025.   Date of Last Pap: 2024    Review of Systems  Review of Systems   Constitutional:  Negative for chills, fever and unexpected weight change.   Respiratory:  Negative for chest tightness and shortness of breath.    Cardiovascular:  Negative for chest pain.   Gastrointestinal:  Negative for abdominal distention, abdominal pain, constipation, diarrhea, nausea and vomiting.   Endocrine: Negative for cold intolerance and heat intolerance.   Genitourinary:  Positive for menstrual problem. Negative for dyspareunia, frequency, pelvic pain, urgency and vaginal discharge.   Skin:  Negative for rash.   Hematological:  Does not bruise/bleed easily.   Psychiatric/Behavioral:  Negative for dysphoric mood. The patient is not  nervous/anxious.         Objective:     /80   Wt 79.5 kg (175 lb 6 oz)   LMP 04/14/2025   BMI 29.18 kg/m²   Body mass index is 29.18 kg/m².     APPEARANCE: Well nourished, well developed, in no acute distress.  PSYCH: Appropriate mood and affect.  SKIN: No acne or hirsutism.  NECK: Neck symmetric without masses or thyromegaly.  NODES: No inguinal, axillary, or supraclavicular lymph node enlargement.  CHEST: Normal respiratory effort.    CARDIOVASCULAR:  Regular rate and rhythm.  LUNGS:  Clear to auscultation bilaterally.  BREASTS: Symmetrical, no skin changes or visible lesions.  No palpable masses or nipple discharge bilaterally.  ABDOMEN: Soft.  No tenderness or masses.    PELVIC: Normal external genitalia without lesions.  Normal hair distribution.  Adequate perineal body, normal urethral meatus.  Vagina moist and well rugated without lesions or discharge.  Cervix pink, without lesions, discharge or tenderness.  No significant cystocele or rectocele.  Bimanual exam shows uterus to be normal size, regular, mobile and nontender.  Adnexa without masses or tenderness.    EXTREMITIES: No edema.  No tenderness to palpation.     Lab Results   Component Value Date    WBC 5.67 03/25/2025    HGB 12.9 03/25/2025    HCT 40.4 03/25/2025    MCV 91 03/25/2025     03/25/2025       Lab Results   Component Value Date    TSH 0.812 08/09/2024     Pathology:  Pap smear 11/19/24 negative/HPV negative  EMB 4/14/25 pending    Diagnostic:  Narrative & Impression  EXAMINATION:  US PELVIS COMP WITH TRANSVAG NON-OB (XPD)     CLINICAL HISTORY:  Abnormal uterine and vaginal bleeding, unspecified     TECHNIQUE:  Transabdominal sonography of the pelvis was performed, followed by transvaginal sonography to better evaluate the uterus and ovaries.     COMPARISON:  None.     FINDINGS:  Uterus:     Size: 9.3 x 4.9 x 6.4 cm     Endometrium is normal caliber measuring 0.4 cm.     Parenchyma is mildly heterogeneous.  Two small  intramural leiomyomas measure up to 1.3 cm.     Right ovary:     Size: 2.9 x 1.4 x 1.8 cm     Left ovary:     Size: 3.2 x 2 x 3 cm.  Dominant follicle, 2.2 cm.     Free Fluid:     None.     Impression:     No acute process seen.     Mildly heterogeneous uterine parenchyma with 2 small intramural leiomyomas measuring up to 1.3 cm.        Electronically signed by:Pearl Haji  Date:                                            11/27/2024  Time:                                           11:41       Clearance per PCP on chart in letters.     Assessment/ Plan:     1. Abnormal uterine bleeding (AUB)  Specimen to Pathology Gynecology and Obstetrics    Endometrial biopsy          To OR for Davinci Hysterectomy, Bilateral salpingectomy, Cystoscopy    All risks and benefits explained, including but not limited to damage to internal organs, including but not limited to uterus, tubes, ovaries, vagina, vulva, urethra, possible inability to remove all tissue, possible hysterectomy, possible blood transfusion, possible need to convert to open procedure. Patient is aware of all risks and desires surgery. All questions were answered. Consents were signed. Patient desires ovarian conservation unless ovaries diseased or damaged during procedure.      Shilpa Wells MD

## 2025-04-15 NOTE — PROCEDURES
Endometrial biopsy    Date/Time: 4/14/2025 1:30 PM    Performed by: Shilpa Wells MD  Authorized by: Shilpa Wells MD    Consent:     Prior to procedure the appropriate consent was completed and verified      Consent given by:  Patient    Patient questions answered: yes      Patient agrees, verbalizes understanding, and wants to proceed: yes      Educational handouts given: yes      Instructions and paperwork completed: yes    Indication:     Indications: Menorrhagia    Pre-procedure:     Pre-procedure timeout performed: yes    Procedure:     Procedure: endometrial biopsy with Pipelle      Cervix cleaned and prepped: yes      A paracervical block was performed: no      An intracervical block was performed: no      The cervix was dilated using cervical dilators: no      Use of single-tooth tenaculum: yes      Uterus sounded: yes      Uterus sound depth (cm):  8.5    Curettes used:  1    Specimen collected: specimen collected and sent to pathology      Patient tolerated procedure well with no complications: yes    Comments:     Procedure comments:  UPT not done due to end of cycle

## 2025-04-16 LAB
ESTROGEN SERPL-MCNC: NORMAL PG/ML
INSULIN SERPL-ACNC: NORMAL U[IU]/ML
LAB AP CLINICAL INFORMATION: NORMAL
LAB AP DIAGNOSIS CATEGORY: NORMAL
LAB AP GROSS DESCRIPTION: NORMAL
LAB AP PERFORMING LOCATION(S): NORMAL
LAB AP REPORT FOOTNOTES: NORMAL

## 2025-04-22 NOTE — H&P
[facility] ([place])  History & Physical  Gynecology    SUBJECTIVE:     Chief Complaint/Reason for Admission: Hysterectomy    History of Present Illness:  Patient is a 39 y.o.  who presents with history of AUB with failed trials of IUD (persistent nausea), Lysteda (did not work) and OCP that cause side effects of nausea and mood changes here for definitive treatment by hysterectomy. Cycles currently are regular lasting 9-10 days and progressively become heavier and longer over past 4 years. Using pad and super tampon for 2-3 days of each cycle having to often change due to heavy bleeding every 1-2 hours. Weakness and dizziness at times. No prior blood transfusion. Been on iron supplements in the past but did not like because they upset her stomach.    OB History          1    Para   1    Term   1            AB        Living   1         SAB        IAB        Ectopic        Multiple        Live Births   1               GYN History  Age of Menarche:11  Age at first pregnancy:    Age at first live birth:   Number of months breastfeeding:    Age at Menopause:    Comments: No abnormal pap or STDs      No medications prior to admission.       Review of patient's allergies indicates:  No Known Allergies    Past Medical History:   Diagnosis Date    Allergy     Dysplastic nevus 2021    r upper arm    Fatty liver disease, nonalcoholic     Hypertension     Melanoma 2006    back    Melanoma 2019    R ankle with Dr. Ferrell with [facility]    Melanoma 2020    R shoulder [facility] at [facility] (postoperative nausea and vomiting)      Past Surgical History:   Procedure Laterality Date    AUGMENTATION OF BREAST      ESOPHAGOGASTRODUODENOSCOPY N/A 2019    Procedure: ESOPHAGOGASTRODUODENOSCOPY (EGD);  Surgeon: Katty Garcia MD;  Location: [facility];  Service: Endoscopy;  Laterality: N/A;    FOOT SURGERY      FOOT SURGERY Right     removal of glass    Gastric sleeve      INJECTION  FOR SENTINEL NODE IDENTIFICATION Right 11/25/2019    Procedure: INJECTION, FOR SENTINEL NODE IDENTIFICATION-Right groin;  Surgeon: Cj Ferrell MD;  Location: [facility] OR [address] [place] [address];  Service: General;  Laterality: Right;    SENTINEL LYMPH NODE BIOPSY Right 11/25/2019    Procedure: BIOPSY, LYMPH NODE, SENTINEL-Right groin;  Surgeon: Cj Ferrell MD;  Location: [facility] OR [address] [place] [address];  Service: General;  Laterality: Right;    TONSILLECTOMY       Family History   Problem Relation Name Age of Onset    Hypertension Mother      Hyperlipidemia Mother      Transient ischemic attack Mother      Lymphoma Mother          non Hodgkins lymphoma, DrLucie Martin    Cancer Father      Lung cancer Father          smoker    Heart disease Maternal Grandfather      Heart disease Sister 3         [place]     Social History[1]    Review of Systems:  Constitutional:  Negative for chills, fever and unexpected weight change.   Respiratory:  Negative for chest tightness and shortness of breath.    Cardiovascular:  Negative for chest pain.   Gastrointestinal:  Negative for abdominal distention, abdominal pain, constipation, diarrhea, nausea and vomiting.   Endocrine: Negative for cold intolerance and heat intolerance.   Genitourinary:  Positive for menstrual problem. Negative for dyspareunia, frequency, pelvic pain, urgency and vaginal discharge.   Skin:  Negative for rash.   Hematological:  Does not bruise/bleed easily.   Psychiatric/Behavioral:  Negative for dysphoric mood. The patient is not nervous/anxious     OBJECTIVE:     Wt Readings from Last 3 Encounters:   04/14/25 75.8 kg (167 lb)   04/14/25 79.5 kg (175 lb 6 oz)   04/11/25 76.5 kg (168 lb 10.4 oz)     Temp Readings from Last 3 Encounters:   04/14/25 98.1 °F (36.7 °C) (Skin)   03/19/25 98 °F (36.7 °C) (Oral)   03/05/25 98.2 °F (36.8 °C)     BP Readings from Last 3 Encounters:   04/14/25 134/80   04/14/25 116/80   04/11/25 (!) 178/103     Pulse Readings from Last 3 Encounters:   04/14/25  82   04/11/25 (!) 114   04/01/25 60           Physical Exam:  APPEARANCE: Well nourished, well developed, in no acute distress.  PSYCH: Appropriate mood and affect.  SKIN: No acne or hirsutism.  NECK: Neck symmetric without masses or thyromegaly.  NODES: No inguinal, axillary, or supraclavicular lymph node enlargement.  CHEST: Normal respiratory effort.    CARDIOVASCULAR:  Regular rate and rhythm.  LUNGS:  Clear to auscultation bilaterally.  BREASTS: Symmetrical, no skin changes or visible lesions.  No palpable masses or nipple discharge bilaterally.  ABDOMEN: Soft.  No tenderness or masses.    PELVIC: Normal external genitalia without lesions.  Normal hair distribution.  Adequate perineal body, normal urethral meatus.  Vagina moist and well rugated without lesions or discharge.  Cervix pink, without lesions, discharge or tenderness.  No significant cystocele or rectocele.  Bimanual exam shows uterus to be normal size, regular, mobile and nontender.  Adnexa without masses or tenderness.    EXTREMITIES: No edema.  No tenderness to palpation.     Laboratory:  Lab Results   Component Value Date    WBC 5.67 03/25/2025    HGB 12.9 03/25/2025    HCT 40.4 03/25/2025    MCV 91 03/25/2025     03/25/2025     Lab Results   Component Value Date    TSH 0.812 08/09/2024     Lab Results   Component Value Date    FERRITIN 5 (L) 08/09/2024     Pathology:  Pap smear 11/19/24 negative/HPV negative  EMB 4/14/25 Endocervical curettage shows fragments of proliferative endometrium and small fragments of benign endocervical tissue, no dysplasia or atypical hyperplasia identified.     Ultrasound:  Results for orders placed in visit on 11/27/24    US Pelvis Comp with Transvag NON-OB (xpd    Narrative  EXAMINATION:  US PELVIS COMP WITH TRANSVAG NON-OB (XPD)    CLINICAL HISTORY:  Abnormal uterine and vaginal bleeding, unspecified    TECHNIQUE:  Transabdominal sonography of the pelvis was performed, followed by transvaginal sonography to  better evaluate the uterus and ovaries.    COMPARISON:  None.    FINDINGS:  Uterus:    Size: 9.3 x 4.9 x 6.4 cm    Endometrium is normal caliber measuring 0.4 cm.    Parenchyma is mildly heterogeneous.  Two small intramural leiomyomas measure up to 1.3 cm.    Right ovary:    Size: 2.9 x 1.4 x 1.8 cm    Left ovary:    Size: 3.2 x 2 x 3 cm.  Dominant follicle, 2.2 cm.    Free Fluid:    None.    Impression  No acute process seen.    Mildly heterogeneous uterine parenchyma with 2 small intramural leiomyomas measuring up to 1.3 cm.      Electronically signed by: Pearl Haji  Date:    11/27/2024  Time:    11:41          ASSESSMENT/PLAN:     1. Abnormal uterine bleeding (AUB)  Specimen to Pathology Gynecology and Obstetrics     Endometrial biopsy             To OR for Davinci Hysterectomy, Bilateral salpingectomy, Cystoscopy     All risks and benefits explained, including but not limited to damage to internal organs, including but not limited to uterus, tubes, ovaries, vagina, vulva, urethra, possible inability to remove all tissue, possible hysterectomy, possible blood transfusion, possible need to convert to open procedure. Patient is aware of all risks and desires surgery. All questions were answered. Consents were signed. Patient desires ovarian conservation unless ovaries diseased or damaged during procedure.       Shilpa Wells MD        [1]   Social History  Tobacco Use    Smoking status: Never    Smokeless tobacco: Never   Substance Use Topics    Alcohol use: Yes     Alcohol/week: 1.0 standard drink of alcohol     Types: 1 Cans of beer per week     Comment: occasional    Drug use: No

## 2025-04-23 ENCOUNTER — ANESTHESIA (OUTPATIENT)
Dept: SURGERY | Facility: OTHER | Age: 40
End: 2025-04-23
Payer: COMMERCIAL

## 2025-04-23 ENCOUNTER — HOSPITAL ENCOUNTER (OUTPATIENT)
Facility: OTHER | Age: 40
Discharge: HOME | End: 2025-04-23
Attending: OBSTETRICS & GYNECOLOGY | Admitting: OBSTETRICS & GYNECOLOGY
Payer: COMMERCIAL

## 2025-04-23 DIAGNOSIS — D50.0 IRON DEFICIENCY ANEMIA DUE TO CHRONIC BLOOD LOSS: ICD-10-CM

## 2025-04-23 DIAGNOSIS — N93.9 ABNORMAL UTERINE BLEEDING DUE TO LEIOMYOMA OF UTERUS: Primary | ICD-10-CM

## 2025-04-23 DIAGNOSIS — N93.9 ABNORMAL UTERINE BLEEDING (AUB): ICD-10-CM

## 2025-04-23 DIAGNOSIS — D25.9 UTERINE LEIOMYOMA, UNSPECIFIED LOCATION: ICD-10-CM

## 2025-04-23 DIAGNOSIS — D25.9 ABNORMAL UTERINE BLEEDING DUE TO LEIOMYOMA OF UTERUS: Primary | ICD-10-CM

## 2025-04-23 LAB
B-HCG UR QL: NEGATIVE
CTP QC/QA: YES
POCT GLUCOSE: 72 MG/DL (ref 70–110)

## 2025-04-23 PROCEDURE — 63600175 PHARM REV CODE 636 W HCPCS

## 2025-04-23 PROCEDURE — 25000003 PHARM REV CODE 250: Performed by: OBSTETRICS & GYNECOLOGY

## 2025-04-23 PROCEDURE — 63600175 PHARM REV CODE 636 W HCPCS: Performed by: ANESTHESIOLOGY

## 2025-04-23 PROCEDURE — 37000008 HC ANESTHESIA 1ST 15 MINUTES: Performed by: OBSTETRICS & GYNECOLOGY

## 2025-04-23 PROCEDURE — 71000015 HC POSTOP RECOV 1ST HR: Performed by: OBSTETRICS & GYNECOLOGY

## 2025-04-23 PROCEDURE — 71000033 HC RECOVERY, INTIAL HOUR: Performed by: OBSTETRICS & GYNECOLOGY

## 2025-04-23 PROCEDURE — 88307 TISSUE EXAM BY PATHOLOGIST: CPT | Mod: TC | Performed by: OBSTETRICS & GYNECOLOGY

## 2025-04-23 PROCEDURE — 37000009 HC ANESTHESIA EA ADD 15 MINS: Performed by: OBSTETRICS & GYNECOLOGY

## 2025-04-23 PROCEDURE — 63600175 PHARM REV CODE 636 W HCPCS: Performed by: OBSTETRICS & GYNECOLOGY

## 2025-04-23 PROCEDURE — 25000003 PHARM REV CODE 250: Performed by: ANESTHESIOLOGY

## 2025-04-23 PROCEDURE — 71000039 HC RECOVERY, EACH ADD'L HOUR: Performed by: OBSTETRICS & GYNECOLOGY

## 2025-04-23 PROCEDURE — C9300 PHARM REV CODE 636 W HCPCS: HCPCS

## 2025-04-23 PROCEDURE — 27201423 OPTIME MED/SURG SUP & DEVICES STERILE SUPPLY: Performed by: OBSTETRICS & GYNECOLOGY

## 2025-04-23 PROCEDURE — 58571 TLH W/T/O 250 G OR LESS: CPT | Mod: ,,, | Performed by: OBSTETRICS & GYNECOLOGY

## 2025-04-23 PROCEDURE — 36000712 HC OR TIME LEV V 1ST 15 MIN: Performed by: OBSTETRICS & GYNECOLOGY

## 2025-04-23 PROCEDURE — 71000016 HC POSTOP RECOV ADDL HR: Performed by: OBSTETRICS & GYNECOLOGY

## 2025-04-23 PROCEDURE — 82962 GLUCOSE BLOOD TEST: CPT | Performed by: OBSTETRICS & GYNECOLOGY

## 2025-04-23 PROCEDURE — 88307 TISSUE EXAM BY PATHOLOGIST: CPT | Mod: 26,,, | Performed by: STUDENT IN AN ORGANIZED HEALTH CARE EDUCATION/TRAINING PROGRAM

## 2025-04-23 PROCEDURE — 25000003 PHARM REV CODE 250

## 2025-04-23 PROCEDURE — C2628 CATHETER, OCCLUSION: HCPCS | Performed by: OBSTETRICS & GYNECOLOGY

## 2025-04-23 PROCEDURE — 58571 TLH W/T/O 250 G OR LESS: CPT | Mod: AS,,, | Performed by: NURSE PRACTITIONER

## 2025-04-23 PROCEDURE — 36000713 HC OR TIME LEV V EA ADD 15 MIN: Performed by: OBSTETRICS & GYNECOLOGY

## 2025-04-23 PROCEDURE — 81025 URINE PREGNANCY TEST: CPT | Performed by: ANESTHESIOLOGY

## 2025-04-23 RX ORDER — KETOROLAC TROMETHAMINE 30 MG/ML
30 INJECTION, SOLUTION INTRAMUSCULAR; INTRAVENOUS
Status: DISCONTINUED | OUTPATIENT
Start: 2025-04-23 | End: 2025-04-23 | Stop reason: HOSPADM

## 2025-04-23 RX ORDER — FAMOTIDINE 20 MG/1
20 TABLET, FILM COATED ORAL
Status: COMPLETED | OUTPATIENT
Start: 2025-04-23 | End: 2025-04-23

## 2025-04-23 RX ORDER — KETAMINE HCL IN 0.9 % NACL 50 MG/5 ML
SYRINGE (ML) INTRAVENOUS
Status: DISCONTINUED | OUTPATIENT
Start: 2025-04-23 | End: 2025-04-23

## 2025-04-23 RX ORDER — SODIUM CHLORIDE, SODIUM LACTATE, POTASSIUM CHLORIDE, CALCIUM CHLORIDE 600; 310; 30; 20 MG/100ML; MG/100ML; MG/100ML; MG/100ML
INJECTION, SOLUTION INTRAVENOUS CONTINUOUS
Status: DISCONTINUED | OUTPATIENT
Start: 2025-04-23 | End: 2025-04-23 | Stop reason: HOSPADM

## 2025-04-23 RX ORDER — ONDANSETRON 4 MG/1
4 TABLET, ORALLY DISINTEGRATING ORAL EVERY 6 HOURS PRN
Qty: 20 TABLET | Refills: 0 | Status: SHIPPED | OUTPATIENT
Start: 2025-04-23

## 2025-04-23 RX ORDER — FENTANYL CITRATE 50 UG/ML
INJECTION, SOLUTION INTRAMUSCULAR; INTRAVENOUS
Status: DISCONTINUED | OUTPATIENT
Start: 2025-04-23 | End: 2025-04-23

## 2025-04-23 RX ORDER — CELECOXIB 200 MG/1
400 CAPSULE ORAL
Status: COMPLETED | OUTPATIENT
Start: 2025-04-23 | End: 2025-04-23

## 2025-04-23 RX ORDER — ACETAMINOPHEN 500 MG
1000 TABLET ORAL
Status: DISCONTINUED | OUTPATIENT
Start: 2025-04-23 | End: 2025-04-23 | Stop reason: HOSPADM

## 2025-04-23 RX ORDER — DOCUSATE SODIUM 100 MG/1
100 CAPSULE, LIQUID FILLED ORAL 2 TIMES DAILY
Qty: 60 CAPSULE | Refills: 0 | Status: SHIPPED | OUTPATIENT
Start: 2025-04-23 | End: 2025-05-23

## 2025-04-23 RX ORDER — ACETAMINOPHEN 500 MG
1000 TABLET ORAL
Status: COMPLETED | OUTPATIENT
Start: 2025-04-23 | End: 2025-04-23

## 2025-04-23 RX ORDER — LIDOCAINE HYDROCHLORIDE 10 MG/ML
0.5 INJECTION, SOLUTION EPIDURAL; INFILTRATION; INTRACAUDAL; PERINEURAL ONCE
Status: DISCONTINUED | OUTPATIENT
Start: 2025-04-23 | End: 2025-04-23 | Stop reason: HOSPADM

## 2025-04-23 RX ORDER — ONDANSETRON HYDROCHLORIDE 2 MG/ML
INJECTION, SOLUTION INTRAVENOUS
Status: DISCONTINUED | OUTPATIENT
Start: 2025-04-23 | End: 2025-04-23

## 2025-04-23 RX ORDER — MUPIROCIN 20 MG/G
OINTMENT TOPICAL
Status: COMPLETED | OUTPATIENT
Start: 2025-04-23 | End: 2025-04-23

## 2025-04-23 RX ORDER — ROCURONIUM BROMIDE 10 MG/ML
INJECTION, SOLUTION INTRAVENOUS
Status: DISCONTINUED | OUTPATIENT
Start: 2025-04-23 | End: 2025-04-23

## 2025-04-23 RX ORDER — PROPOFOL 10 MG/ML
VIAL (ML) INTRAVENOUS
Status: DISCONTINUED | OUTPATIENT
Start: 2025-04-23 | End: 2025-04-23

## 2025-04-23 RX ORDER — POLYETHYLENE GLYCOL 3350 17 G/17G
17 POWDER, FOR SOLUTION ORAL DAILY
Status: DISCONTINUED | OUTPATIENT
Start: 2025-04-23 | End: 2025-04-23 | Stop reason: HOSPADM

## 2025-04-23 RX ORDER — PROPOFOL 10 MG/ML
VIAL (ML) INTRAVENOUS CONTINUOUS PRN
Status: DISCONTINUED | OUTPATIENT
Start: 2025-04-23 | End: 2025-04-23

## 2025-04-23 RX ORDER — DIPHENHYDRAMINE HCL 25 MG
25 CAPSULE ORAL EVERY 4 HOURS PRN
Status: DISCONTINUED | OUTPATIENT
Start: 2025-04-23 | End: 2025-04-23 | Stop reason: HOSPADM

## 2025-04-23 RX ORDER — PROCHLORPERAZINE EDISYLATE 5 MG/ML
5 INJECTION INTRAMUSCULAR; INTRAVENOUS EVERY 6 HOURS PRN
Status: DISCONTINUED | OUTPATIENT
Start: 2025-04-23 | End: 2025-04-23 | Stop reason: HOSPADM

## 2025-04-23 RX ORDER — LIDOCAINE HYDROCHLORIDE 10 MG/ML
0.5 INJECTION, SOLUTION EPIDURAL; INFILTRATION; INTRACAUDAL; PERINEURAL
Status: DISCONTINUED | OUTPATIENT
Start: 2025-04-23 | End: 2025-04-23 | Stop reason: HOSPADM

## 2025-04-23 RX ORDER — HYDROCODONE BITARTRATE AND ACETAMINOPHEN 5; 325 MG/1; MG/1
1 TABLET ORAL EVERY 6 HOURS PRN
Qty: 20 TABLET | Refills: 0 | Status: SHIPPED | OUTPATIENT
Start: 2025-04-23

## 2025-04-23 RX ORDER — OXYCODONE HYDROCHLORIDE 5 MG/1
5 TABLET ORAL
Status: DISCONTINUED | OUTPATIENT
Start: 2025-04-23 | End: 2025-04-23 | Stop reason: HOSPADM

## 2025-04-23 RX ORDER — PREGABALIN 50 MG/1
50 CAPSULE ORAL
Status: COMPLETED | OUTPATIENT
Start: 2025-04-23 | End: 2025-04-23

## 2025-04-23 RX ORDER — HYDROMORPHONE HYDROCHLORIDE 2 MG/ML
0.4 INJECTION, SOLUTION INTRAMUSCULAR; INTRAVENOUS; SUBCUTANEOUS EVERY 5 MIN PRN
Refills: 0 | Status: DISCONTINUED | OUTPATIENT
Start: 2025-04-23 | End: 2025-04-23 | Stop reason: HOSPADM

## 2025-04-23 RX ORDER — PROCHLORPERAZINE EDISYLATE 5 MG/ML
5 INJECTION INTRAMUSCULAR; INTRAVENOUS EVERY 30 MIN PRN
Status: DISCONTINUED | OUTPATIENT
Start: 2025-04-23 | End: 2025-04-23 | Stop reason: HOSPADM

## 2025-04-23 RX ORDER — HYDROCODONE BITARTRATE AND ACETAMINOPHEN 5; 325 MG/1; MG/1
1 TABLET ORAL EVERY 4 HOURS PRN
Status: DISCONTINUED | OUTPATIENT
Start: 2025-04-23 | End: 2025-04-23 | Stop reason: HOSPADM

## 2025-04-23 RX ORDER — HYDROCODONE BITARTRATE AND ACETAMINOPHEN 10; 325 MG/1; MG/1
1 TABLET ORAL EVERY 4 HOURS PRN
Status: DISCONTINUED | OUTPATIENT
Start: 2025-04-23 | End: 2025-04-23 | Stop reason: HOSPADM

## 2025-04-23 RX ORDER — AMOXICILLIN 250 MG
1 CAPSULE ORAL 2 TIMES DAILY
Status: DISCONTINUED | OUTPATIENT
Start: 2025-04-23 | End: 2025-04-23 | Stop reason: HOSPADM

## 2025-04-23 RX ORDER — CEFAZOLIN 2 G/1
2 INJECTION, POWDER, FOR SOLUTION INTRAMUSCULAR; INTRAVENOUS
Status: COMPLETED | OUTPATIENT
Start: 2025-04-23 | End: 2025-04-23

## 2025-04-23 RX ORDER — LABETALOL HYDROCHLORIDE 5 MG/ML
INJECTION, SOLUTION INTRAVENOUS
Status: DISCONTINUED | OUTPATIENT
Start: 2025-04-23 | End: 2025-04-23

## 2025-04-23 RX ORDER — LIDOCAINE HYDROCHLORIDE 20 MG/ML
INJECTION INTRAVENOUS
Status: DISCONTINUED | OUTPATIENT
Start: 2025-04-23 | End: 2025-04-23

## 2025-04-23 RX ORDER — DIPHENHYDRAMINE HYDROCHLORIDE 50 MG/ML
25 INJECTION, SOLUTION INTRAMUSCULAR; INTRAVENOUS EVERY 4 HOURS PRN
Status: DISCONTINUED | OUTPATIENT
Start: 2025-04-23 | End: 2025-04-23 | Stop reason: HOSPADM

## 2025-04-23 RX ORDER — GLUCAGON 1 MG
1 KIT INJECTION
Status: DISCONTINUED | OUTPATIENT
Start: 2025-04-23 | End: 2025-04-23 | Stop reason: HOSPADM

## 2025-04-23 RX ORDER — ACETAMINOPHEN 325 MG/1
650 TABLET ORAL EVERY 4 HOURS PRN
Status: DISCONTINUED | OUTPATIENT
Start: 2025-04-23 | End: 2025-04-23 | Stop reason: HOSPADM

## 2025-04-23 RX ORDER — DEXMEDETOMIDINE HYDROCHLORIDE 100 UG/ML
INJECTION, SOLUTION INTRAVENOUS
Status: DISCONTINUED | OUTPATIENT
Start: 2025-04-23 | End: 2025-04-23

## 2025-04-23 RX ORDER — DEXAMETHASONE SODIUM PHOSPHATE 4 MG/ML
INJECTION, SOLUTION INTRA-ARTICULAR; INTRALESIONAL; INTRAMUSCULAR; INTRAVENOUS; SOFT TISSUE
Status: DISCONTINUED | OUTPATIENT
Start: 2025-04-23 | End: 2025-04-23

## 2025-04-23 RX ORDER — SCOPOLAMINE 1 MG/3D
1 PATCH, EXTENDED RELEASE TRANSDERMAL
Status: DISCONTINUED | OUTPATIENT
Start: 2025-04-23 | End: 2025-04-23 | Stop reason: HOSPADM

## 2025-04-23 RX ORDER — SODIUM CHLORIDE 0.9 % (FLUSH) 0.9 %
3 SYRINGE (ML) INJECTION
Status: DISCONTINUED | OUTPATIENT
Start: 2025-04-23 | End: 2025-04-23 | Stop reason: HOSPADM

## 2025-04-23 RX ORDER — ONDANSETRON HYDROCHLORIDE 2 MG/ML
4 INJECTION, SOLUTION INTRAVENOUS DAILY PRN
Status: DISCONTINUED | OUTPATIENT
Start: 2025-04-23 | End: 2025-04-23 | Stop reason: HOSPADM

## 2025-04-23 RX ORDER — IBUPROFEN 800 MG/1
800 TABLET ORAL EVERY 8 HOURS PRN
Qty: 30 TABLET | Refills: 0 | Status: SHIPPED | OUTPATIENT
Start: 2025-04-23

## 2025-04-23 RX ORDER — ONDANSETRON 8 MG/1
8 TABLET, ORALLY DISINTEGRATING ORAL EVERY 8 HOURS PRN
Status: DISCONTINUED | OUTPATIENT
Start: 2025-04-23 | End: 2025-04-23 | Stop reason: HOSPADM

## 2025-04-23 RX ORDER — KETOROLAC TROMETHAMINE 30 MG/ML
INJECTION, SOLUTION INTRAMUSCULAR; INTRAVENOUS
Status: DISCONTINUED | OUTPATIENT
Start: 2025-04-23 | End: 2025-04-23

## 2025-04-23 RX ADMIN — ROCURONIUM BROMIDE 50 MG: 10 INJECTION INTRAVENOUS at 08:04

## 2025-04-23 RX ADMIN — PROPOFOL 50 MG: 10 INJECTION, EMULSION INTRAVENOUS at 09:04

## 2025-04-23 RX ADMIN — ROCURONIUM BROMIDE 20 MG: 10 INJECTION INTRAVENOUS at 08:04

## 2025-04-23 RX ADMIN — PROCHLORPERAZINE EDISYLATE 5 MG: 5 INJECTION INTRAMUSCULAR; INTRAVENOUS at 10:04

## 2025-04-23 RX ADMIN — FENTANYL CITRATE 100 MCG: 50 INJECTION, SOLUTION INTRAMUSCULAR; INTRAVENOUS at 08:04

## 2025-04-23 RX ADMIN — KETOROLAC TROMETHAMINE 30 MG: 30 INJECTION, SOLUTION INTRAMUSCULAR; INTRAVENOUS at 09:04

## 2025-04-23 RX ADMIN — SODIUM CHLORIDE, SODIUM LACTATE, POTASSIUM CHLORIDE, AND CALCIUM CHLORIDE: .6; .31; .03; .02 INJECTION, SOLUTION INTRAVENOUS at 09:04

## 2025-04-23 RX ADMIN — PROPOFOL 200 MG: 10 INJECTION, EMULSION INTRAVENOUS at 08:04

## 2025-04-23 RX ADMIN — LABETALOL HYDROCHLORIDE 5 MG: 5 INJECTION INTRAVENOUS at 09:04

## 2025-04-23 RX ADMIN — DEXAMETHASONE SODIUM PHOSPHATE 8 MG: 4 INJECTION, SOLUTION INTRAMUSCULAR; INTRAVENOUS at 08:04

## 2025-04-23 RX ADMIN — SUGAMMADEX 200 MG: 100 INJECTION, SOLUTION INTRAVENOUS at 09:04

## 2025-04-23 RX ADMIN — FAMOTIDINE 20 MG: 20 TABLET, FILM COATED ORAL at 06:04

## 2025-04-23 RX ADMIN — SODIUM CHLORIDE, POTASSIUM CHLORIDE, SODIUM LACTATE AND CALCIUM CHLORIDE: 600; 310; 30; 20 INJECTION, SOLUTION INTRAVENOUS at 10:04

## 2025-04-23 RX ADMIN — HYDROMORPHONE HYDROCHLORIDE 0.4 MG: 2 INJECTION INTRAMUSCULAR; INTRAVENOUS; SUBCUTANEOUS at 10:04

## 2025-04-23 RX ADMIN — DEXMEDETOMIDINE HYDROCHLORIDE 12 MCG: 100 INJECTION, SOLUTION, CONCENTRATE INTRAVENOUS at 08:04

## 2025-04-23 RX ADMIN — PROPOFOL 150 MCG/KG/MIN: 10 INJECTION, EMULSION INTRAVENOUS at 08:04

## 2025-04-23 RX ADMIN — MUPIROCIN: 20 OINTMENT TOPICAL at 06:04

## 2025-04-23 RX ADMIN — CELECOXIB 400 MG: 200 CAPSULE ORAL at 06:04

## 2025-04-23 RX ADMIN — ONDANSETRON HYDROCHLORIDE 4 MG: 2 INJECTION INTRAMUSCULAR; INTRAVENOUS at 09:04

## 2025-04-23 RX ADMIN — DEXMEDETOMIDINE HYDROCHLORIDE 8 MCG: 100 INJECTION, SOLUTION, CONCENTRATE INTRAVENOUS at 08:04

## 2025-04-23 RX ADMIN — Medication 25 MG: at 08:04

## 2025-04-23 RX ADMIN — INDIGO CARMINE 40 MG: 8 INJECTION, SOLUTION INTRAMUSCULAR; INTRAVENOUS at 09:04

## 2025-04-23 RX ADMIN — HYDROCODONE BITARTRATE AND ACETAMINOPHEN 1 TABLET: 10; 325 TABLET ORAL at 10:04

## 2025-04-23 RX ADMIN — ACETAMINOPHEN 1000 MG: 500 TABLET ORAL at 06:04

## 2025-04-23 RX ADMIN — PREGABALIN 50 MG: 50 CAPSULE ORAL at 06:04

## 2025-04-23 RX ADMIN — SCOPOLAMINE 1 PATCH: 1.5 PATCH, EXTENDED RELEASE TRANSDERMAL at 06:04

## 2025-04-23 RX ADMIN — SODIUM CHLORIDE, SODIUM LACTATE, POTASSIUM CHLORIDE, AND CALCIUM CHLORIDE: .6; .31; .03; .02 INJECTION, SOLUTION INTRAVENOUS at 07:04

## 2025-04-23 RX ADMIN — LIDOCAINE HYDROCHLORIDE 100 MG: 20 INJECTION, SOLUTION INTRAVENOUS at 08:04

## 2025-04-23 RX ADMIN — CEFAZOLIN 2 G: 2 INJECTION, POWDER, FOR SOLUTION INTRAMUSCULAR; INTRAVENOUS at 08:04

## 2025-04-23 NOTE — TRANSFER OF CARE
Anesthesia Transfer of Care Note    Patient: Katarina Munguia    Procedure(s) Performed: Procedure(s) (LRB):  XI ROBOTIC HYSTERECTOMY,WITH SALPINGECTOMY (Bilateral)  CYSTOSCOPY (N/A)    Patient location: PACU    Anesthesia Type: general    Transport from OR: Transported from OR on room air with adequate spontaneous ventilation    Post pain: adequate analgesia    Post assessment: no apparent anesthetic complications and tolerated procedure well    Post vital signs: stable    Level of consciousness: responds to stimulation and sedated    Nausea/Vomiting: no nausea/vomiting    Complications: none    Transfer of care protocol was followed      Last vitals: Visit Vitals  BP (!) 140/95 (BP Location: Left arm, Patient Position: Lying)   Pulse 77   Temp 36.2 °C (97.1 °F) (Temporal)   Resp 18   LMP 04/14/2025   SpO2 99%   Breastfeeding No

## 2025-04-23 NOTE — PROGRESS NOTES
When attempting to infuse fluid into the bladder for the voiding trial, the chery catheter became dislodged from the bladder, as the balloon was not inflated.  She was able to spontaneously void 100 cc's.  I sent a SC message to Dr. Wells, and left a vm on her cell phone, as well.  Report given to CHEYENNE Polanco RN.

## 2025-04-23 NOTE — ANESTHESIA PROCEDURE NOTES
Intubation    Date/Time: 4/23/2025 8:08 AM    Performed by: Roland Coelho CRNA  Authorized by: Maynor Huddleston MD    Intubation:     Induction:  Intravenous    Intubated:  Postinduction    Mask Ventilation:  Easy mask    Attempts:  1    Attempted By:  CRNA    Method of Intubation:  Video laryngoscopy    Blade:  Simmons 3    Laryngeal View Grade: Grade I - full view of cords      Difficult Airway Encountered?: No      Complications:  None    Airway Device:  Oral endotracheal tube    Airway Device Size:  7.5    Style/Cuff Inflation:  Cuffed (inflated to minimal occlusive pressure)    Tube secured:  23    Secured at:  The lips    Placement Verified By:  Capnometry    Complicating Factors:  None    Findings Post-Intubation:  BS equal bilateral and atraumatic/condition of teeth unchanged

## 2025-04-23 NOTE — OP NOTE
OBGYN  Operative Note    SUMMARY     Date of Procedure: 4/23/2025     Procedure: Davinci Robotic Hysterectomy, Bilateral Salpingectomy, Cystoscopy   Procedure(s) (LRB):  XI ROBOTIC HYSTERECTOMY,WITH SALPINGECTOMY (Bilateral)  CYSTOSCOPY (N/A)       Surgeons and Role:     * Shilpa Wells MD - Primary    Assistant: Linda Turcios NP    A qualified resident was not available.    Pre-Operative Diagnosis: Abnormal uterine bleeding (AUB) [N93.9]  Iron deficiency anemia due to chronic blood loss [D50.0]  Uterine leiomyoma, unspecified location [D25.9]    Post-Operative Diagnosis: Post-Op Diagnosis Codes:     * Abnormal uterine bleeding (AUB) [N93.9]     * Iron deficiency anemia due to chronic blood loss [D50.0]     * Uterine leiomyoma, unspecified location [D25.9]    Anesthesia: General    Description of the Findings of the Procedure: Exam under anesthesia: Vulva and vagina without lesions, discharge or bleeding. Cervix with os closed and no lesions or bleeding. Uterus mobile, non-tender measuring 8-10 weeks size. Adnexa without masses or fullness.  Operative: Abdomen- Normal peritoneal surfaces. Normal liver and stomach edge. Gallbladder not seen due to dilated loops of bowel  normal bowel in appearance. Normal appendix. Pelvis- Normal peritoneal surfaces and bladder. Uterus 9weeks size with small 1 cm fundal serosal fibroid noted. Normal bilateral tubes and ovaries. No evidence of adhesions or endometriosis.       Technical Procedures Used: The patient was taken to the operating room where general anesthesia was performed. She was then prepped and draped in the normal sterile fashion. A chery was placed to gravity. A KINGA 1 manipulator was placed in the normal fashion using the medium cervical cup. And the uterine manipulator placed without difficulty.     Attention was then turned to the abdomen. Towel clamps were used to elevate the abdomen. A scalpel was used to make an 8 mm infraumbilical skin incision. The Veress  needle was used to enter the abdomen. With a starting pressure of 1 mm Hg the abdomen was then insufflated to 15 mm Hg without difficulty.     A 8mm bladeless Davinci Trocar was then placed in the umbilical incision. The patient was placed in trendelenburg position. The above findings were noted. A 8 mm bladeless trocar was placed in the right lateral abdomen with visualization with the camera. Another 8 mm bladeless trocar was placed in the left lateral abdomen with visualization with the camera. A left upper quadrant 8 mm trocar was placed with visualization with the camera without difficulty. The pateint was then positioned in trendelenburg position.    The Robot was then docked in the usual fashion with the [place] bipolar in the left hand and the monopolar endoshears in the right hand. Attention was then turned to the left fallopian tube which was elevated and ligated and this was carried to the left round ligament which was ligated. Using bipolar as well as endoshears the left uterine artery was skeletonized. Attention was then turned to the right fallopian tube which was elevated and  from the ovary. The right round ligament and utero-ovarian ligament were ligated using the bipolar. The right uterine artery was then skeletonized. A bladder flap was then created using the monopolar endoshears.     An anterior colpotomy was made using the monopolar endoshears around the blue KINGA cup. Attention was then turned posteriorly and an posterior colpotomy was made at the level of the uterosacral ligaments with the endoshears. This was carried around the blue KINGA cup. The left uterine artery was then ligated using the [place] bipolar with excellent hemostasis.  The right uterine artery was ligated using the [place] bipolar with excellent hemostasis. The uterus was now free and removed through the vagina without difficulty.     The abdomen was then irrigated and cleared of all clots and debris. The vaginal  cuff was noted to have excellent hemostasis. A 0 V-lock suture was then used to run the vaginal cuff starting at the right angle and running all the way to the left angle and then back towards the middle. The suture was then cut flush with the tissue.     The abdomen was then irrigated and cleared of all clots and debris. The pressure was then brought down to 5 mm HG and no active bleeding was noted. The instruments were removed from the vagina. The trocars were then removed without difficulty. The incisions were closed using 4-0 Monocryl with excellent hemostasis.     Diagnostic cystoscopy was performed and bilateral ureteral efflux was seen. Blue dye used to tint urine for easy visualization or urine jets. No bladder laceration or lesions were visualized. Bubble seen at dome of bladder. The cystoscope was removed and a new chery catheter was placed. The patient tolerated the procedure well. Sponge, lap and needle counts were correct x 2.    Complications: No    Estimated Blood Loss (EBL): 50 mL  IVF: 1000 cc  UOP: 400 cc clear urine at end of procedure    Specimens:   Specimen (24h ago, onward)       Start     Ordered    04/23/25 0909  Specimen to Pathology Gynecology and Obstetrics  RELEASE UPON ORDERING        References:    Click here for ordering Quick Tip   Question:  Release to patient  Answer:  Immediate    04/23/25 0909                            Condition: Good    Disposition: PACU - hemodynamically stable.          Discharge Note    SUMMARY     Admit Date: 4/23/2025    Discharge Date and Time: 04/23/2025 12PM    Hospital Course (synopsis of major diagnoses, care, treatment, and services provided during the course of the hospital stay): Patient was admitted for surgery. Following surgery she was transferred to the floor and underwent routine postoperative care. She tolerated po, ambulated without difficulty, and pain was well controlled. She remained afebrile throughout hospital course and her labs were  stable. She was discharged to home in stable condition.      Final Diagnosis: Post-Op Diagnosis Codes:  Abnormal uterine bleeding (AUB) [N93.9]  Iron deficiency anemia due to chronic blood loss [D50.0]  Uterine leiomyoma, unspecified location [D25.9]    Disposition: Home or Self Care    Follow Up/Patient Instructions: Dr. Wells in 2 weeks    Medications:  Reconciled Home Medications:      Medication List        START taking these medications      docusate sodium 100 MG capsule  Commonly known as: COLACE  Take 1 capsule (100 mg total) by mouth 2 (two) times daily.     HYDROcodone-acetaminophen 5-325 mg per tablet  Commonly known as: NORCO  Take 1 tablet by mouth every 6 (six) hours as needed for Pain.     ibuprofen 800 MG tablet  Commonly known as: ADVIL,MOTRIN  Take 1 tablet (800 mg total) by mouth every 8 (eight) hours as needed for Pain (mild to moderate pain).     ondansetron 4 MG Tbdl  Commonly known as: ZOFRAN-ODT  Take 1 tablet (4 mg total) by mouth every 6 (six) hours as needed (nausea).            CONTINUE taking these medications      ALPRAZolam 0.25 MG tablet  Commonly known as: XANAX  Take 1 tablet (0.25 mg total) by mouth nightly as needed for Anxiety.     celecoxib 200 MG capsule  Commonly known as: CeleBREX  Take 1 capsule (200 mg total) by mouth 2 (two) times daily.     cyclobenzaprine 10 MG tablet  Commonly known as: FLEXERIL  Take 1 tablet (10 mg total) by mouth 3 (three) times daily as needed for Muscle spasms.     fluticasone propionate 50 mcg/actuation nasal spray  Commonly known as: FLONASE  1 spray (50 mcg total) by Each Nostril route once daily.     LIDOcaine 5 %  Commonly known as: LIDODERM  Place 1 patch onto the skin once daily. Remove & Discard patch within 12 hours or as directed by MD     triamcinolone acetonide 0.1% 0.1 % ointment  Commonly known as: KENALOG  SMARTSIG:sparingly Topical Twice Daily            STOP taking these medications      norethindrone 0.35 mg tablet  Commonly known  as: MICRONOR            No discharge procedures on file.

## 2025-04-23 NOTE — PLAN OF CARE
Katarina Munguia has met all discharge criteria from Phase II. Vital Signs are stable, ambulating  without difficulty. Discharge instructions given, patient verbalized understanding. Discharged from facility via wheelchair in stable condition.

## 2025-04-23 NOTE — ANESTHESIA POSTPROCEDURE EVALUATION
Anesthesia Post Evaluation    Patient: Katarina Munguia    Procedure(s) Performed: Procedure(s) (LRB):  XI ROBOTIC HYSTERECTOMY,WITH SALPINGECTOMY (Bilateral)  CYSTOSCOPY (N/A)    Final Anesthesia Type: general      Patient location during evaluation: PACU  Patient participation: Yes- Able to Participate  Level of consciousness: awake and alert  Post-procedure vital signs: reviewed and stable  Pain management: adequate  Airway patency: patent    PONV status at discharge: No PONV  Anesthetic complications: no      Cardiovascular status: blood pressure returned to baseline  Respiratory status: spontaneous ventilation  Hydration status: euvolemic  Follow-up not needed.          Vitals Value Taken Time   /66 04/23/25 12:25   Temp 36.7 °C (98 °F) 04/23/25 11:25   Pulse 85 04/23/25 12:25   Resp 16 04/23/25 12:25   SpO2 85 % 04/23/25 12:25         Event Time   Out of Recovery 11:14:20         Pain/Vasiliy Score: Pain Rating Prior to Med Admin: 7 (4/23/2025 10:17 AM)  Pain Rating Post Med Admin: 3 (4/23/2025 12:25 PM)  Vasiliy Score: 10 (4/23/2025 12:25 PM)

## 2025-04-23 NOTE — OR NURSING
Report marcellus from AGUEDA Rodríguez .  Care assumed. Patient resting in bed, VSS.  Respirations  reguar and unlabored.  Patient denies any pain at present time.   at bedside.

## 2025-04-24 ENCOUNTER — PATIENT MESSAGE (OUTPATIENT)
Dept: OBSTETRICS AND GYNECOLOGY | Facility: CLINIC | Age: 40
End: 2025-04-24
Payer: COMMERCIAL

## 2025-04-24 VITALS
SYSTOLIC BLOOD PRESSURE: 120 MMHG | DIASTOLIC BLOOD PRESSURE: 70 MMHG | OXYGEN SATURATION: 96 % | TEMPERATURE: 98 F | RESPIRATION RATE: 18 BRPM | HEART RATE: 82 BPM

## 2025-04-24 LAB
ESTROGEN SERPL-MCNC: NORMAL PG/ML
INSULIN SERPL-ACNC: NORMAL U[IU]/ML
LAB AP CLINICAL INFORMATION: NORMAL
LAB AP GROSS DESCRIPTION: NORMAL
LAB AP PERFORMING LOCATION(S): NORMAL
LAB AP REPORT FOOTNOTES: NORMAL

## 2025-04-25 ENCOUNTER — TELEPHONE (OUTPATIENT)
Dept: OBSTETRICS AND GYNECOLOGY | Facility: CLINIC | Age: 40
End: 2025-04-25
Payer: COMMERCIAL

## 2025-04-25 NOTE — TELEPHONE ENCOUNTER
Phone call made to patient to follow up after surgery. No answer. Left message to call back today with any questions/concerns. Postop visit scheduled.

## 2025-04-30 ENCOUNTER — PATIENT MESSAGE (OUTPATIENT)
Dept: OBSTETRICS AND GYNECOLOGY | Facility: CLINIC | Age: 40
End: 2025-04-30
Payer: COMMERCIAL

## 2025-05-08 ENCOUNTER — OFFICE VISIT (OUTPATIENT)
Dept: PAIN MEDICINE | Facility: CLINIC | Age: 40
End: 2025-05-08
Payer: COMMERCIAL

## 2025-05-08 DIAGNOSIS — G89.29 CHRONIC BILATERAL LOW BACK PAIN WITHOUT SCIATICA: ICD-10-CM

## 2025-05-08 DIAGNOSIS — M54.2 ACUTE NECK PAIN: Primary | ICD-10-CM

## 2025-05-08 DIAGNOSIS — M54.50 CHRONIC BILATERAL LOW BACK PAIN WITHOUT SCIATICA: ICD-10-CM

## 2025-05-08 DIAGNOSIS — M62.838 NECK MUSCLE SPASM: ICD-10-CM

## 2025-05-08 NOTE — PROGRESS NOTES
[facility] - Established Patient Evaluation    Telemedicine Encounter    Telemedicine Bundle:  The patient location is: patient's home  The chief complaint leading to consultation is: No chief complaint on file.  Visit type: Virtual visit with synchronous audio and video  Each patient to whom he or she provides medical services by telemedicine is:    (1) informed of the relationship between the physician and patient and the respective role of any other health care provider with respect to management of the patient  (2) notified that he or she may decline to receive medical services by telemedicine and may withdraw from such care at any time.      Referred by: No ref. provider found   Reason for referral: Acute neck pain     CC:   No chief complaint on file.        4/11/2025     8:25 AM   Last 3 PDI Scores   Pain Disability Index (PDI) 63     Interval History 5/8/2025:   Katarina Munguia is a 39 y.o. female presents virtually for follow up of her neck pain.  Since last visit the pain has has significantly improved.  She has been participating in PT, although she did have to take a break as she recently had her hysterectomy in his recovering.  She reports her neck pain is 1/10 and tolerable.  Today she complains of low back pain.  Low back pain is axial in nature and does not radiate.  She has been evaluated by a chiropractor and was told she has spondylosis.  Back pain rated 4/10.  Denies any weakness falls or trauma      Subjective 04/11/2025:    Katarina Munguia is a 39 y.o. female who presents complaining of 4 days of acute left sided neck pain.  Pain is located to the left lateral neck and left upper trapezius muscle.  Denies any radiation into the upper extremity on the left.  No history of spine or shoulder surgeries.  Denies any inciting incident or trauma but does note she is very active attending physical therapy for her low back and working out in the gym  independently several times weekly.  States she had most recently been doing shoulder and arm exercises in the gym.  Pain is worse with extension and lateral rotation of the C-spine.  She has tried Tylenol, over-the-counter NSAID, and tizanidine with no relief.  She states she has an upcoming robotic hysterectomy scheduled on 04/23.    Initial Pain Assessment:  Location: Left Neck   Onset: 4 days; no inciting incident   Current Pain Score: 9/10  Daily Pain of Range: 8-9/10  Quality: Aching, Dull, Sharp, and Shooting  Radiation: left trapezius   Worsened by: nothing in particular  Improved by: heat, ice, and medications     Patient denies urinary incontinence, bowel incontinence, significant motor weakness, and loss of sensations.    Previous Interventions:  - none    Previous Therapies:  PT/OT: yes   Chiropractor: Yes  HEP:   Relevant Surgery: no     Previous Medications:   - Tylenol or NSAIDS: Tylenol and OTC NSAID, Celebrex  - Muscle Relaxants: Tizandine, Flexeril  - TCAs:   - SNRIs:   - Topicals:   - Anticonvulsants:    - Opioids:   - Adjuvants:     Current Pain Medications:  Tylenol  Celebrex PRN  Flexeril    Anticoagulation:  None    Review of Systems:  ROS    GENERAL:  No weight loss, malaise or fevers. NO  HEENT:   No recent changes in vision or hearing NO  NECK:  No difficulty with swallowing. No stridor. NO  RESPIRATORY:  Negative for cough, wheezing or shortness of breath, patient denies any recent URI. YES .SHORTNESS OF BREATH   CARDIOVASCULAR:  Negative for chest pain, leg swelling or palpitations. YES. CHEST PAIN   GI:  Negative for abdominal discomfort, blood in stools or black stools or change in bowel habits. NO  MUSCULOSKELETAL:  See HPI.  SKIN:  Negative for lesions, rash, and itching. NO   PSYCH:  No mood disorder or recent psychosocial stressors. YES. WORK STRESS   HEMATOLOGY/LYMPHOLOGY:  Negative for prolonged bleeding, bruising easily or swollen nodes.  Patient is not currently taking any  anti-coagulants NO   NEURO:   No history of headaches, syncope, paralysis, seizures or tremors. NO   All other reviewed and negative other than HPI.    History:  Current medications, allergies, medical history, surgical history,   family history, and social history were reviewed in the chart as marked.    Full Medication List:  Current Medications[1]     Allergies:  Patient has no known allergies.     Medical History:   has a past medical history of Allergy, Dysplastic nevus (06/21/2021), Fatty liver disease, nonalcoholic, Hypertension, Melanoma (2006), Melanoma (11/2019), Melanoma (11/2020), and PONV (postoperative nausea and vomiting).    Surgical History:   has a past surgical history that includes Tonsillectomy; Foot surgery; [place] lymph node biopsy (Right, 11/25/2019); Injection for sentinel node identification (Right, 11/25/2019); Esophagogastroduodenoscopy (N/A, 11/29/2019); Augmentation of breast; Foot surgery (Right, 2006); Gastric sleeve (2022); xi robotic hysterectomy, with salpingectomy (Bilateral, 4/23/2025); and Cystoscopy (N/A, 4/23/2025).    Family History:  family history includes Cancer in her father; Heart disease in her maternal grandfather and sister; Hyperlipidemia in her mother; Hypertension in her mother; Lung cancer in her father; Lymphoma in her mother; Transient ischemic attack in her mother.    Social History:   reports that she has never smoked. She has never used smokeless tobacco. She reports current alcohol use of about 1.0 standard drink of alcohol per week. She reports that she does not use drugs.    Physical Exam:  There were no vitals filed for this visit.    Previous PE  GENERAL: Well appearing, in no acute distress, alert and oriented x3.  PSYCH:  Mood and affect appropriate.  SKIN: Skin color, texture, turgor normal, no rashes or lesions.  HEAD/FACE:  Normocephalic, atraumatic. Cranial nerves grossly intact.  NECK:  Decreased range of motion of the cervical spine in all  planes, secondary to pain.  +pain to palpation over the cervical paraspinous muscles and upper trapezius on the left. Spurling Negative.   CV: RRR with palpation of the radial artery.  PULM: No evidence of respiratory difficulty, symmetric chest rise.  GI:  Soft and non-distended.  MSK:  Peripheral joint ROM is full and pain free without obvious instability or laxity in all four extremities. No obvious deformities, edema, or skin discoloration.  No atrophy or tone abnormalities are noted.   NEURO: Bilateral upper and lower extremity coordination and strength is symmetric.  No loss of sensation is noted. No clonus. Woods negative.  MENTAL STATUS: A x O x 3, good concentration, speech is fluent and goal directed  MOTOR: 5/5 in bilateral upper extremity muscle groups  GAIT: Normal. Ambulates unassisted.      Virtual Visit PE 05/08/2025  GEN: No acute distress. Calm, comfortable  HENT: Normocephalic, atraumatic, moist mucous membranes  EYE: Anicteric sclera, non-injected  CV: Non-diaphoretic.  CHEST: Breathing comfortably. Chest expansion symmetric  EXT: No clubbing, cyanosis.   Psych: Mood and affect are appropriate  GAIT: Independent, normal ambulation    Imaging:  XR CERVICAL SPINE COMPLETE 5 VIEW     CLINICAL HISTORY:  . Cervicalgia     TECHNIQUE:  AP, Lateral, bilateral oblique and open mouth views of the cervical spine were performed.     COMPARISON:  None     FINDINGS:  No significant disc space narrowing.  No encroachment of the neural foramina identified on either side.  No fracture or dislocation.  No bone destruction identified      Electronically signed by:Chandan Luis MD  Date:                                            04/11/2025      Labs:  BMP  Lab Results   Component Value Date     03/25/2025    K 4.1 03/25/2025     03/25/2025    CO2 20 (L) 03/25/2025    BUN 9 03/25/2025    CREATININE 0.6 03/25/2025    CALCIUM 8.7 03/25/2025    ANIONGAP 11 03/25/2025    EGFRNORACEVR >60 03/25/2025     Lab  Results   Component Value Date    ALT 12 03/25/2025    AST 16 03/25/2025    ALKPHOS 33 (L) 03/25/2025    BILITOT 1.1 (H) 03/25/2025     Lab Results   Component Value Date    WBC 5.67 03/25/2025    HGB 12.9 03/25/2025    HCT 40.4 03/25/2025    MCV 91 03/25/2025     03/25/2025           Assessment:  Problem List Items Addressed This Visit    None  Visit Diagnoses         Acute neck pain    -  Primary      Neck muscle spasm          Chronic bilateral low back pain without sciatica                  04/11/2025 - Katarina Munguia is a 39 y.o. female who  has a past medical history of Allergy, Dysplastic nevus (06/21/2021), Fatty liver disease, nonalcoholic, Hypertension, Melanoma (2006), Melanoma (11/2019), Melanoma (11/2020), and PONV (postoperative nausea and vomiting).  By history and examination this patient has acute left-sided neck pain x 4 days.  The underlying cause is most likely muscle strain or sprain.  I will get an x-ray to rule out any cervical spine injury.  Recommending PT, lidocaine patches, Flexeril, Celebrex, and Tylenol.  She may benefit from trigger point injections versus Medrol Dosepak, however the patient has an upcoming surgery, so I will defer steroids for now.  The risks and benefits of each treatment option were discussed and all questions were answered.      04/08/2025 patient presents for virtual follow up of her neck pain.  Since last visit, neck pain has significantly improved.  Today she reports axial low back pain.  I am recommending that she follow up in clinic so that I can further assess her low back pain.  We discussed that she may benefit from lumbar imaging, injections.     Treatment Plan:   Procedures:    May benefit from trigger point injections if neck pain persists.    PT/OT/HEP: I have stressed the importance of physical activity and a home exercise plan to help with pain and improve health.  Continue with therapy as tolerated.  Medications:    - lidocaine  patches to the area, she has some at home.   - Celebrex 200 mg b.i.d. PRN   - Tylenol PRN   -  Reviewed and consistent with medication use as prescribed.  Imaging:  Lumbar imaging reviewed cervical imaging reviewed.  Follow Up:  Recommend complete physical therapy for the neck and then follow up in clinic so I can assess her low back pain and discussed imaging and treatment options.    Suzie España DO   Interventional Pain Medicine / Anesthesiology    Disclaimer: This note was partly generated using dictation software which may occasionally result in transcription errors.           [1]   Current Outpatient Medications:     ALPRAZolam (XANAX) 0.25 MG tablet, Take 1 tablet (0.25 mg total) by mouth nightly as needed for Anxiety., Disp: 30 tablet, Rfl: 0    celecoxib (CELEBREX) 200 MG capsule, Take 1 capsule (200 mg total) by mouth 2 (two) times daily., Disp: 60 capsule, Rfl: 0    cyclobenzaprine (FLEXERIL) 10 MG tablet, Take 1 tablet (10 mg total) by mouth 3 (three) times daily as needed for Muscle spasms., Disp: 90 tablet, Rfl: 0    docusate sodium (COLACE) 100 MG capsule, Take 1 capsule (100 mg total) by mouth 2 (two) times daily., Disp: 60 capsule, Rfl: 0    fluticasone propionate (FLONASE) 50 mcg/actuation nasal spray, 1 spray (50 mcg total) by Each Nostril route once daily., Disp: 48 mL, Rfl: 6    HYDROcodone-acetaminophen (NORCO) 5-325 mg per tablet, Take 1 tablet by mouth every 6 (six) hours as needed for Pain. (Patient not taking: Reported on 5/7/2025), Disp: 20 tablet, Rfl: 0    ibuprofen (ADVIL,MOTRIN) 800 MG tablet, Take 1 tablet (800 mg total) by mouth every 8 (eight) hours as needed for Pain (mild to moderate pain)., Disp: 30 tablet, Rfl: 0    LIDOcaine (LIDODERM) 5 %, Place 1 patch onto the skin once daily. Remove & Discard patch within 12 hours or as directed by MD, Disp: 30 patch, Rfl: 0    ondansetron (ZOFRAN-ODT) 4 MG TbDL, Take 1 tablet (4 mg total) by mouth every 6 (six) hours as needed  (nausea)., Disp: 20 tablet, Rfl: 0    triamcinolone acetonide 0.1% (KENALOG) 0.1 % ointment, SMARTSIG:sparingly Topical Twice Daily (Patient not taking: Reported on 5/7/2025), Disp: , Rfl:

## 2025-06-05 ENCOUNTER — OFFICE VISIT (OUTPATIENT)
Dept: PAIN MEDICINE | Facility: CLINIC | Age: 40
End: 2025-06-05
Payer: COMMERCIAL

## 2025-06-05 VITALS
DIASTOLIC BLOOD PRESSURE: 79 MMHG | HEIGHT: 65 IN | HEART RATE: 80 BPM | WEIGHT: 177.25 LBS | BODY MASS INDEX: 29.53 KG/M2 | SYSTOLIC BLOOD PRESSURE: 125 MMHG

## 2025-06-05 DIAGNOSIS — M51.369 DEGENERATION OF INTERVERTEBRAL DISC OF LUMBAR REGION, UNSPECIFIED WHETHER PAIN PRESENT: ICD-10-CM

## 2025-06-05 DIAGNOSIS — M54.50 CHRONIC MIDLINE LOW BACK PAIN WITHOUT SCIATICA: Primary | ICD-10-CM

## 2025-06-05 DIAGNOSIS — M54.9 DORSALGIA, UNSPECIFIED: ICD-10-CM

## 2025-06-05 DIAGNOSIS — G89.29 CHRONIC MIDLINE LOW BACK PAIN WITHOUT SCIATICA: Primary | ICD-10-CM

## 2025-06-05 PROCEDURE — 99999 PR PBB SHADOW E&M-EST. PATIENT-LVL III: CPT | Mod: PBBFAC,,, | Performed by: STUDENT IN AN ORGANIZED HEALTH CARE EDUCATION/TRAINING PROGRAM

## 2025-06-12 DIAGNOSIS — M54.9 BACK PAIN, UNSPECIFIED BACK LOCATION, UNSPECIFIED BACK PAIN LATERALITY, UNSPECIFIED CHRONICITY: ICD-10-CM

## 2025-06-12 RX ORDER — LIDOCAINE 50 MG/G
1 PATCH TOPICAL DAILY
Qty: 30 PATCH | Refills: 0 | Status: SHIPPED | OUTPATIENT
Start: 2025-06-12

## 2025-06-12 NOTE — TELEPHONE ENCOUNTER
No care due was identified.  [facility] Embedded Care Due Messages. Reference number: [id number].   6/12/2025 10:03:27 AM CDT

## 2025-06-26 NOTE — H&P
Abdomen , soft, nontender, nondistended , no guarding or rigidity , no masses palpable [facility]  Gastroenterology  H&P    Patient Name: Katarina Munguia  MRN: [id number]  Admission Date: 11/29/2019  Code Status: Prior    Attending Provider: Katty Garcia MD   Primary Care Physician: Shilpa Sarabia MD  Principal Problem:<principal problem not specified>    Subjective:     History of Present Illness: Dyspepsia    Past Medical History:   Diagnosis Date    Allergy     Melanoma        Past Surgical History:   Procedure Laterality Date    FOOT SURGERY      INJECTION FOR SENTINEL NODE IDENTIFICATION Right 11/25/2019    Procedure: INJECTION, FOR SENTINEL NODE IDENTIFICATION-Right groin;  Surgeon: Cj Ferrell MD;  Location: [address];  Service: General;  Laterality: Right;    SENTINEL LYMPH NODE BIOPSY Right 11/25/2019    Procedure: BIOPSY, LYMPH NODE, SENTINEL-Right groin;  Surgeon: Cj Ferrell MD;  Location: [address];  Service: General;  Laterality: Right;    TONSILLECTOMY         Review of patient's allergies indicates:  No Known Allergies  Family History     Problem Relation (Age of Onset)    Cancer Father    Heart disease Maternal Grandfather, Sister    Hyperlipidemia Mother    Hypertension Mother    Lung cancer Father    Transient ischemic attack Mother        Tobacco Use    Smoking status: Never Smoker    Smokeless tobacco: Never Used   Substance and Sexual Activity    Alcohol use: Yes     Alcohol/week: 1.0 standard drinks     Types: 1 Cans of beer per week     Frequency: 2-4 times a month     Drinks per session: 1 or 2     Binge frequency: Never     Comment: occasional    Drug use: No    Sexual activity: Not on file     Review of Systems   Constitutional: Negative for appetite change and unexpected weight change.   Respiratory: Negative for shortness of breath and wheezing.    Cardiovascular: Negative for chest pain and palpitations.   Gastrointestinal: Positive for abdominal pain. Negative for blood in stool.     Objective:     Vital Signs  (Most Recent):  Temp: 98.4 °F (36.9 °C) (11/29/19 0959)  Pulse: 76 (11/29/19 0959)  Resp: 16 (11/29/19 0959)  BP: 127/61 (11/29/19 0959)  SpO2: 99 % (11/29/19 0959) Vital Signs (24h Range):  Temp:  [98.4 °F (36.9 °C)] 98.4 °F (36.9 °C)  Pulse:  [76] 76  Resp:  [16] 16  SpO2:  [99 %] 99 %  BP: (127)/(61) 127/61     Weight: 104.3 kg (230 lb) (11/29/19 0959)  Body mass index is 38.27 kg/m².    No intake or output data in the 24 hours ending 11/29/19 1005    Lines/Drains/Airways     Peripheral Intravenous Line                 Peripheral IV - Single Lumen 11/29/19 1003 22 G Left Wrist less than 1 day                Physical Exam   Constitutional: She is oriented to person, place, and time. She appears well-developed and well-nourished. No distress.   HENT:   Head: Normocephalic and atraumatic.   Eyes: Conjunctivae are normal. No scleral icterus.   Neck: Normal range of motion. Neck supple. No tracheal deviation present. No thyromegaly present.   Cardiovascular: Normal rate and regular rhythm. Exam reveals no gallop and no friction rub.   Pulmonary/Chest: Effort normal and breath sounds normal. No respiratory distress. She has no wheezes.   Abdominal: Soft. Bowel sounds are normal. She exhibits no distension. There is no tenderness.   Musculoskeletal:        Right wrist: She exhibits normal range of motion and no tenderness.        Left wrist: She exhibits normal range of motion and no tenderness.   Lymphadenopathy:        Head (right side): No submental and no submandibular adenopathy present.        Head (left side): No submental and no submandibular adenopathy present.   Neurological: She is alert and oriented to person, place, and time.   Skin: Skin is warm and dry. No rash noted. She is not diaphoretic. No erythema.   Psychiatric: She has a normal mood and affect. Her behavior is normal.   Nursing note and vitals reviewed.      Assessment/Plan:     Active Diagnoses:    Diagnosis Date Noted POA    Dyspepsia [R10.13]  11/29/2019 Yes      Problems Resolved During this Admission:     Plan  1. EGD    Katty Garcia MD  Gastroenterology  [facility]

## (undated) DEVICE — ELECTRODE REM PLYHSV RETURN 9

## (undated) DEVICE — SUT MCRYL PLUS 4-0 PS2 27IN

## (undated) DEVICE — APPLICATOR CHLORAPREP ORN 26ML

## (undated) DEVICE — APPLIER LIGACLIP MED 11IN

## (undated) DEVICE — SEAL CANN UNIVERSAL 5-12MM

## (undated) DEVICE — GAUZE FLUFF XXLG 36X36 2 PLY

## (undated) DEVICE — SET IRR CYSTO 4 LEAD 90IN

## (undated) DEVICE — DEVICE SNAPSECURE FOL CATH

## (undated) DEVICE — OBTURATOR BLADELESS 8MM XI CLR

## (undated) DEVICE — NDL INSUFFLATION VERRES 120MM

## (undated) DEVICE — SYS SEE SHARP SCP ANTIFG LNG

## (undated) DEVICE — DRESSING TELFA STRL 4X3 LF

## (undated) DEVICE — SUT VICRYL 3-0 27 SH

## (undated) DEVICE — Device

## (undated) DEVICE — CONTAINER SPECIMEN STRL 4OZ

## (undated) DEVICE — NEOGUARD COVER 4X30CM STERILE

## (undated) DEVICE — DRESSING XEROFORM FOIL PK 1X8

## (undated) DEVICE — SYR SLIP TIP 1CC

## (undated) DEVICE — KIT WING PAD POSITIONING

## (undated) DEVICE — SUT VICRYL PLUS 0 CT1 36IN

## (undated) DEVICE — COVER TIP CURVED SCISSORS XI

## (undated) DEVICE — SEE MEDLINE ITEM 152515

## (undated) DEVICE — SEE MEDLINE ITEM 157128

## (undated) DEVICE — BANDAGE KERLIX P/P 2.25IN STER

## (undated) DEVICE — SEE MEDLINE ITEM 152622

## (undated) DEVICE — DRAPE ARM DAVINCI XI

## (undated) DEVICE — UNDERGLOVES BIOGEL PI SZ 7 LF

## (undated) DEVICE — SOL NORMAL USPCA 0.9%

## (undated) DEVICE — GLOVE BIOGEL SKINSENSE PI 6.5

## (undated) DEVICE — SUT VICRYL 0 27 CT-2

## (undated) DEVICE — ADHESIVE DERMABOND ADVANCED

## (undated) DEVICE — SET TRI-LUMEN FILTERED TUBE

## (undated) DEVICE — SYS LABEL CORRECT MED

## (undated) DEVICE — DRAPE SPLIT STERILE

## (undated) DEVICE — OCCLUDER COLPO-PNEUMO STERILE

## (undated) DEVICE — IRRIGATOR ENDOSCOPY DISP.

## (undated) DEVICE — SPONGE DERMA 8PLY 2X2

## (undated) DEVICE — TRAY MINOR GEN SURG

## (undated) DEVICE — NDL 18GA X1 1/2 REG BEVEL

## (undated) DEVICE — ELECTRODE BLADE INSULATED 1 IN

## (undated) DEVICE — CONTAINER SPEC OR STRL 4.5OZ

## (undated) DEVICE — JELLY SURGILUBE 5GR

## (undated) DEVICE — TRAY DO THE ROBOT

## (undated) DEVICE — SOL POVIDONE PREP IODINE 4 OZ

## (undated) DEVICE — INSERT CUSHIONPRONE VIEW LARGE

## (undated) DEVICE — PACK UNIVERSAL SPLIT II

## (undated) DEVICE — SUT 0 V-LOC GR GS-21 TAPER

## (undated) DEVICE — STOCKINET 4INX48

## (undated) DEVICE — SOL POVIDONE SCRUB IODINE 4 OZ

## (undated) DEVICE — SOL IRRI STRL WATER 1000ML

## (undated) DEVICE — PORT ACCESS 8MM W/120MM LOW

## (undated) DEVICE — SYR DISP LL 5CC

## (undated) DEVICE — SOL ELECTROLUBE ANTI-STIC

## (undated) DEVICE — NDL HYPO REG 25G X 1 1/2

## (undated) DEVICE — DRAPE COLUMN DAVINCI XI

## (undated) DEVICE — SEE MEDLINE ITEM 146417